# Patient Record
Sex: MALE | Race: WHITE | NOT HISPANIC OR LATINO
[De-identification: names, ages, dates, MRNs, and addresses within clinical notes are randomized per-mention and may not be internally consistent; named-entity substitution may affect disease eponyms.]

---

## 2024-08-10 ENCOUNTER — TRANSCRIPTION ENCOUNTER (OUTPATIENT)
Age: 49
End: 2024-08-10

## 2024-08-11 ENCOUNTER — INPATIENT (INPATIENT)
Facility: HOSPITAL | Age: 49
LOS: 15 days | Discharge: HOME | End: 2024-08-27
Attending: COLON & RECTAL SURGERY | Admitting: SURGERY
Payer: MEDICARE

## 2024-08-11 VITALS
SYSTOLIC BLOOD PRESSURE: 139 MMHG | HEART RATE: 84 BPM | WEIGHT: 171.96 LBS | DIASTOLIC BLOOD PRESSURE: 86 MMHG | HEIGHT: 65 IN | RESPIRATION RATE: 18 BRPM | OXYGEN SATURATION: 98 % | TEMPERATURE: 98 F

## 2024-08-11 DIAGNOSIS — Z90.49 ACQUIRED ABSENCE OF OTHER SPECIFIED PARTS OF DIGESTIVE TRACT: Chronic | ICD-10-CM

## 2024-08-11 LAB
ADD ON TEST-SPECIMEN IN LAB: SIGNIFICANT CHANGE UP
ALBUMIN SERPL ELPH-MCNC: 3.9 G/DL — SIGNIFICANT CHANGE UP (ref 3.3–5)
ALP SERPL-CCNC: 37 U/L — LOW (ref 40–120)
ALT FLD-CCNC: 11 U/L — SIGNIFICANT CHANGE UP (ref 10–45)
ANION GAP SERPL CALC-SCNC: 8 MMOL/L — SIGNIFICANT CHANGE UP (ref 5–17)
APPEARANCE UR: CLEAR — SIGNIFICANT CHANGE UP
APTT BLD: 27.9 SEC — SIGNIFICANT CHANGE UP (ref 24.5–35.6)
AST SERPL-CCNC: 16 U/L — SIGNIFICANT CHANGE UP (ref 10–40)
BASOPHILS # BLD AUTO: 0.04 K/UL — SIGNIFICANT CHANGE UP (ref 0–0.2)
BASOPHILS NFR BLD AUTO: 0.6 % — SIGNIFICANT CHANGE UP (ref 0–2)
BILIRUB SERPL-MCNC: 1.1 MG/DL — SIGNIFICANT CHANGE UP (ref 0.2–1.2)
BILIRUB UR-MCNC: NEGATIVE — SIGNIFICANT CHANGE UP
BLD GP AB SCN SERPL QL: NEGATIVE — SIGNIFICANT CHANGE UP
BUN SERPL-MCNC: 12 MG/DL — SIGNIFICANT CHANGE UP (ref 7–23)
CALCIUM SERPL-MCNC: 8.8 MG/DL — SIGNIFICANT CHANGE UP (ref 8.4–10.5)
CHLORIDE SERPL-SCNC: 105 MMOL/L — SIGNIFICANT CHANGE UP (ref 96–108)
CO2 SERPL-SCNC: 25 MMOL/L — SIGNIFICANT CHANGE UP (ref 22–31)
COLOR SPEC: YELLOW — SIGNIFICANT CHANGE UP
CREAT SERPL-MCNC: 0.73 MG/DL — SIGNIFICANT CHANGE UP (ref 0.5–1.3)
CRP SERPL-MCNC: 19.5 MG/L — HIGH (ref 0–4)
DIFF PNL FLD: NEGATIVE — SIGNIFICANT CHANGE UP
EGFR: 113 ML/MIN/1.73M2 — SIGNIFICANT CHANGE UP
EOSINOPHIL # BLD AUTO: 0.59 K/UL — HIGH (ref 0–0.5)
EOSINOPHIL NFR BLD AUTO: 8.6 % — HIGH (ref 0–6)
GLUCOSE SERPL-MCNC: 123 MG/DL — HIGH (ref 70–99)
GLUCOSE UR QL: NEGATIVE MG/DL — SIGNIFICANT CHANGE UP
HBV CORE AB SER-ACNC: SIGNIFICANT CHANGE UP
HBV SURFACE AB SER-ACNC: SIGNIFICANT CHANGE UP
HBV SURFACE AG SER-ACNC: SIGNIFICANT CHANGE UP
HCT VFR BLD CALC: 42.1 % — SIGNIFICANT CHANGE UP (ref 39–50)
HGB BLD-MCNC: 13.8 G/DL — SIGNIFICANT CHANGE UP (ref 13–17)
HIV 1+2 AB+HIV1 P24 AG SERPL QL IA: SIGNIFICANT CHANGE UP
IMM GRANULOCYTES NFR BLD AUTO: 0.1 % — SIGNIFICANT CHANGE UP (ref 0–0.9)
INR BLD: 0.98 — SIGNIFICANT CHANGE UP (ref 0.85–1.18)
KETONES UR-MCNC: NEGATIVE MG/DL — SIGNIFICANT CHANGE UP
LACTATE SERPL-SCNC: 0.8 MMOL/L — SIGNIFICANT CHANGE UP (ref 0.5–2)
LEUKOCYTE ESTERASE UR-ACNC: NEGATIVE — SIGNIFICANT CHANGE UP
LIDOCAIN IGE QN: 34 U/L — SIGNIFICANT CHANGE UP (ref 7–60)
LYMPHOCYTES # BLD AUTO: 1.27 K/UL — SIGNIFICANT CHANGE UP (ref 1–3.3)
LYMPHOCYTES # BLD AUTO: 18.6 % — SIGNIFICANT CHANGE UP (ref 13–44)
MCHC RBC-ENTMCNC: 27.1 PG — SIGNIFICANT CHANGE UP (ref 27–34)
MCHC RBC-ENTMCNC: 32.8 GM/DL — SIGNIFICANT CHANGE UP (ref 32–36)
MCV RBC AUTO: 82.5 FL — SIGNIFICANT CHANGE UP (ref 80–100)
MONOCYTES # BLD AUTO: 0.76 K/UL — SIGNIFICANT CHANGE UP (ref 0–0.9)
MONOCYTES NFR BLD AUTO: 11.1 % — SIGNIFICANT CHANGE UP (ref 2–14)
NEUTROPHILS # BLD AUTO: 4.16 K/UL — SIGNIFICANT CHANGE UP (ref 1.8–7.4)
NEUTROPHILS NFR BLD AUTO: 61 % — SIGNIFICANT CHANGE UP (ref 43–77)
NITRITE UR-MCNC: NEGATIVE — SIGNIFICANT CHANGE UP
NRBC # BLD: 0 /100 WBCS — SIGNIFICANT CHANGE UP (ref 0–0)
PH UR: 5.5 — SIGNIFICANT CHANGE UP (ref 5–8)
PLATELET # BLD AUTO: 288 K/UL — SIGNIFICANT CHANGE UP (ref 150–400)
POTASSIUM SERPL-MCNC: 4.1 MMOL/L — SIGNIFICANT CHANGE UP (ref 3.5–5.3)
POTASSIUM SERPL-SCNC: 4.1 MMOL/L — SIGNIFICANT CHANGE UP (ref 3.5–5.3)
PROT SERPL-MCNC: 7.1 G/DL — SIGNIFICANT CHANGE UP (ref 6–8.3)
PROT UR-MCNC: SIGNIFICANT CHANGE UP MG/DL
PROTHROM AB SERPL-ACNC: 11.2 SEC — SIGNIFICANT CHANGE UP (ref 9.5–13)
RBC # BLD: 5.1 M/UL — SIGNIFICANT CHANGE UP (ref 4.2–5.8)
RBC # FLD: 13.2 % — SIGNIFICANT CHANGE UP (ref 10.3–14.5)
RH IG SCN BLD-IMP: NEGATIVE — SIGNIFICANT CHANGE UP
RH IG SCN BLD-IMP: NEGATIVE — SIGNIFICANT CHANGE UP
SODIUM SERPL-SCNC: 138 MMOL/L — SIGNIFICANT CHANGE UP (ref 135–145)
SP GR SPEC: 1.03 — SIGNIFICANT CHANGE UP (ref 1–1.03)
UROBILINOGEN FLD QL: 1 MG/DL — SIGNIFICANT CHANGE UP (ref 0.2–1)
WBC # BLD: 6.83 K/UL — SIGNIFICANT CHANGE UP (ref 3.8–10.5)
WBC # FLD AUTO: 6.83 K/UL — SIGNIFICANT CHANGE UP (ref 3.8–10.5)

## 2024-08-11 PROCEDURE — 99285 EMERGENCY DEPT VISIT HI MDM: CPT

## 2024-08-11 PROCEDURE — 71045 X-RAY EXAM CHEST 1 VIEW: CPT | Mod: 26

## 2024-08-11 PROCEDURE — 74177 CT ABD & PELVIS W/CONTRAST: CPT | Mod: 26,MC

## 2024-08-11 PROCEDURE — 93010 ELECTROCARDIOGRAM REPORT: CPT

## 2024-08-11 DEVICE — STAPLER COVIDIEN TRI-STAPLE 45MM PURPLE RELOAD: Type: IMPLANTABLE DEVICE | Status: FUNCTIONAL

## 2024-08-11 DEVICE — STAPLER ETHICON PROXIMATE RELOAD 75MM BLUE: Type: IMPLANTABLE DEVICE | Status: FUNCTIONAL

## 2024-08-11 DEVICE — CLIP APPLIER ETHICON LIGACLIP 9 3/8" SMALL: Type: IMPLANTABLE DEVICE | Status: FUNCTIONAL

## 2024-08-11 DEVICE — STAPLER ETHICON PROXIMATE 75MM WITH BLUE RELOAD: Type: IMPLANTABLE DEVICE | Status: FUNCTIONAL

## 2024-08-11 DEVICE — STAPLER COVIDIEN TRI-STAPLE 60MM PURPLE RELOAD: Type: IMPLANTABLE DEVICE | Status: FUNCTIONAL

## 2024-08-11 DEVICE — CLIP APPLIER ETHICON LIGACLIP 11.5" MEDIUM: Type: IMPLANTABLE DEVICE | Status: FUNCTIONAL

## 2024-08-11 DEVICE — STAPLER COVIDIEN TA 60 BLUE: Type: IMPLANTABLE DEVICE | Status: FUNCTIONAL

## 2024-08-11 RX ORDER — HYDROMORPHONE HYDROCHLORIDE 2 MG/1
0.2 TABLET ORAL
Refills: 0 | Status: DISCONTINUED | OUTPATIENT
Start: 2024-08-11 | End: 2024-08-18

## 2024-08-11 RX ORDER — MENTHOL/CETYLPYRD CL 3 MG
1 LOZENGE MUCOUS MEMBRANE ONCE
Refills: 0 | Status: COMPLETED | OUTPATIENT
Start: 2024-08-11 | End: 2024-08-11

## 2024-08-11 RX ORDER — HEPARIN SODIUM,BOVINE 1000/ML
5000 VIAL (ML) INJECTION EVERY 8 HOURS
Refills: 0 | Status: DISCONTINUED | OUTPATIENT
Start: 2024-08-11 | End: 2024-08-21

## 2024-08-11 RX ORDER — ACETAMINOPHEN 325 MG/1
1000 TABLET ORAL ONCE
Refills: 0 | Status: COMPLETED | OUTPATIENT
Start: 2024-08-11 | End: 2024-08-11

## 2024-08-11 RX ORDER — ONDANSETRON 2 MG/ML
4 INJECTION, SOLUTION INTRAMUSCULAR; INTRAVENOUS EVERY 6 HOURS
Refills: 0 | Status: DISCONTINUED | OUTPATIENT
Start: 2024-08-11 | End: 2024-08-21

## 2024-08-11 RX ORDER — IOHEXOL 350 MG/ML
30 INJECTION, SOLUTION INTRAVENOUS ONCE
Refills: 0 | Status: COMPLETED | OUTPATIENT
Start: 2024-08-11 | End: 2024-08-11

## 2024-08-11 RX ORDER — SODIUM CHLORIDE 9 MG/ML
1000 INJECTION INTRAMUSCULAR; INTRAVENOUS; SUBCUTANEOUS ONCE
Refills: 0 | Status: COMPLETED | OUTPATIENT
Start: 2024-08-11 | End: 2024-08-11

## 2024-08-11 RX ADMIN — Medication 100 MILLILITER(S): at 14:30

## 2024-08-11 RX ADMIN — Medication 4 MILLIGRAM(S): at 01:23

## 2024-08-11 RX ADMIN — Medication 4 MILLIGRAM(S): at 03:20

## 2024-08-11 RX ADMIN — Medication 120 MILLILITER(S): at 05:08

## 2024-08-11 RX ADMIN — Medication 5000 UNIT(S): at 05:28

## 2024-08-11 RX ADMIN — ACETAMINOPHEN 400 MILLIGRAM(S): 325 TABLET ORAL at 17:55

## 2024-08-11 RX ADMIN — IOHEXOL 30 MILLILITER(S): 350 INJECTION, SOLUTION INTRAVENOUS at 01:23

## 2024-08-11 RX ADMIN — ACETAMINOPHEN 1000 MILLIGRAM(S): 325 TABLET ORAL at 05:58

## 2024-08-11 RX ADMIN — ACETAMINOPHEN 1000 MILLIGRAM(S): 325 TABLET ORAL at 18:55

## 2024-08-11 RX ADMIN — ACETAMINOPHEN 400 MILLIGRAM(S): 325 TABLET ORAL at 11:22

## 2024-08-11 RX ADMIN — Medication 4 MILLIGRAM(S): at 03:47

## 2024-08-11 RX ADMIN — SODIUM CHLORIDE 1000 MILLILITER(S): 9 INJECTION INTRAMUSCULAR; INTRAVENOUS; SUBCUTANEOUS at 03:20

## 2024-08-11 RX ADMIN — Medication 5000 UNIT(S): at 16:13

## 2024-08-11 RX ADMIN — Medication 120 MILLILITER(S): at 17:56

## 2024-08-11 RX ADMIN — Medication 1 SPRAY(S): at 14:29

## 2024-08-11 RX ADMIN — SODIUM CHLORIDE 1000 MILLILITER(S): 9 INJECTION INTRAMUSCULAR; INTRAVENOUS; SUBCUTANEOUS at 01:23

## 2024-08-11 RX ADMIN — ACETAMINOPHEN 400 MILLIGRAM(S): 325 TABLET ORAL at 05:28

## 2024-08-11 NOTE — ED PROVIDER NOTE - PROGRESS NOTE DETAILS
[place]: labs grossly wnl, UA no infeciton. CT pending, will reassess. [place]: CT showed "IMPRESSION: Status post small bowel resection with ileocolic anastomosis. Distended and thickened small bowel loops proximal to the anastomosis with transition zone identified at the level of the anastomosis and an additional transition point identified more proximally. Findings are   suspicious for a closed loop small bowel obstruction. Wall thickening of the distended small bowel loops is consistent with enteritis which could indicate early ischemia versus inflammatory bowel disease. Mild bladder wall thickening could indicate cystitis which may be reactive."  Pt remains well appearing. States pain is intermittent. Surgery consulted. Updated pt.

## 2024-08-11 NOTE — PROGRESS NOTE ADULT - SUBJECTIVE AND OBJECTIVE BOX
pt seen and examined.  see resident note for full details.  patient with acute presentation with abd pain x 1 day.  said he was completely fine last week.  CT concerning for closed loop vs crohns flare    PEx: patient uncomfortable  abd soft distended, no rigidity but sore when i palpate.    A/P Pt w/ hx crohns w/ closed loop vs crohns flare  -explained thought process to patient.  the purpose of procedure is hopefully diagnostic and possibly therapeutic.  hopefully will just cut a band of scar tissue but possibility does include crohns flare.  if just enteritis, will likely close and start medical therapy postop.  explained risks of surgery including bleeding, infection and injury to other organs and possible need for bowel resection if bowel is injured or unclear etiology.  he understands and wishes  to proceed.

## 2024-08-11 NOTE — ED PROVIDER NOTE - OBJECTIVE STATEMENT
49M PMH Crohns, PSHx bowel resection (~10yrs ago) p/w generalized abd pain since yesterday (friday), decreased appetite. No other systemic symptoms.   States he is not on any daily meds and hasn't had issues w/ Crohn's since his surgery ~10yrs ago.   Last BM ~1900, normal.   Denies fevers, chills, nausea, vomiting, diarrhea, black stool, bloody stool, dysuria, hematuria, urinary frequency, focal weakness, focal numbness, lightheadedness, back pain, SOB, CP, rhinorrhea, nasal congestion, sore throat, cough.

## 2024-08-11 NOTE — PATIENT PROFILE ADULT - FALL HARM RISK - UNIVERSAL INTERVENTIONS
Bed in lowest position, wheels locked, appropriate side rails in place/Call bell, personal items and telephone in reach/Instruct patient to call for assistance before getting out of bed or chair/Non-slip footwear when patient is out of bed/Serafina to call system/Physically safe environment - no spills, clutter or unnecessary equipment/Purposeful Proactive Rounding/Room/bathroom lighting operational, light cord in reach

## 2024-08-11 NOTE — H&P ADULT - NSHPPOAPULMEMBOLUS_GEN_A_CORE
Face to face report given with opportunity to observe patient.    Report given to Bianca Paulson   10/24/2017  7:15 PM     no

## 2024-08-11 NOTE — ED PROVIDER NOTE - CLINICAL SUMMARY MEDICAL DECISION MAKING FREE TEXT BOX
49M PMH Crohns, PSHx bowel resection (~10yrs ago) p/w generalized abd pain since yesterday, decreased appetite. No other systemic symptoms.   Vitals wnl, exam as above.   ddx: Possible SBO vs. colitis/Crohn's flare.  Labs, CT, IVF/attempt symptom control, reassess.   Pt requesting Dr. Jade if surgical issues (pt states that he was in touch w/ Lupillo Kong).

## 2024-08-11 NOTE — PROGRESS NOTE ADULT - ASSESSMENT
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 for abdominal pain. Patient afebrile, HD normal on presentation without acute laboratory abnormalaties noted. CTAP concerning for closed loop obstruction with two distinct transition points. Now s/p dx lap, lysis of adhesions (8/11)      NPO/IVF  s/p dx lap, no closed loop obstruction  NGT LIWS  TOV  HSQ/SCDs/IS/OOBA  Serial abdominal exams  GI Consult

## 2024-08-11 NOTE — ED PROVIDER NOTE - PHYSICAL EXAMINATION
abd: mild distention and tympanitic, soft, mild diffuse ttp, no rebound/guarding. No CVAT  no LE edema, normal equal distal pulses, steady unassisted gait.

## 2024-08-11 NOTE — H&P ADULT - HISTORY OF PRESENT ILLNESS
Patient is a 49M with PMHx of Crohn's disease (diagnosed in 2021, not currently on medications) and PSHx of SBR of terminal ileum (10 years ago) who presents to [facility] for abdominal pain. Patient reports the pain started yesterday afternoon and has been progressively worsening. Describes the pain as an intermittent, sharp pain, mostly in lower abdomen that is 5/10 at its worst. Denies nausea or emesis. States last BM was at 7 PM and last flatus was Friday PM. Denies fevers or chills at home. Also endorses poor PO intake over the past 24 hours secondary to poor appetite, but states he feels hungry now. States pain is nearly identical to prior Crohn's flares.    Medical History: Crohns  Surgical History: SBR  Medications: Denies  Allergies: Denies  Social History: Nonsmoker. No alcohol use  Scope: Last c-scope 2021 without acute findigs  No family history of CRC, IBD.    In the ED, patient afebrile, nontoxic appearing:   - VITALS: Afebrile T 97.5 F, HR 79, /77 saturating well on RA  - LABORATORY: No acute abnormalaties noted  - IMAGING: CTAP concerning for closed loop obstruction with 2 transition points - one at ileocolic anastamosis and one proximal with bowel wall thickeing (ischemia vs. active Crohns)

## 2024-08-11 NOTE — PROGRESS NOTE ADULT - SUBJECTIVE AND OBJECTIVE BOX
Procedure: dx lap, lysis of adhesions  Surgeon: Dr. Molina    S: Pt seen and examined at bedside. Patient is lying comfortably in bed with no complaints. Tolerating diet, pain well controlled with current regimen. Patient denies fever, nausea, vomiting, chest pain, and shortness of breath. Patient is not yet passing gas or having bowel movements. Patient has not voided yet.     O:  T(C): 36.6 (08-11-24 @ 12:35), Max: 36.6 (08-11-24 @ 10:11)  T(F): 97.8 (08-11-24 @ 12:35), Max: 97.8 (08-11-24 @ 10:11)  HR: 66 (08-11-24 @ 12:35) (66 - 79)  BP: 116/73 (08-11-24 @ 12:35) (116/73 - 147/88)  RR: 17 (08-11-24 @ 12:35) (11 - 20)  SpO2: 96% (08-11-24 @ 12:35) (94% - 98%)  Wt(kg): --                        13.8   6.83  )-----------( 288      ( 11 Aug 2024 01:02 )             42.1     08-11    138  |  105  |  12  ----------------------------<  123<H>  4.1   |  25  |  0.73    Ca    8.8      11 Aug 2024 01:02    TPro  7.1  /  Alb  3.9  /  TBili  1.1  /  DBili  x   /  AST  16  /  ALT  11  /  AlkPhos  37<L>  08-11      Physical Exam  General: Patient is doing well and lying in bed comfortably  Constitutional: alert and oriented   Pulm: Nonlabored breathing, no respiratory distress  CV: Regular rate and rhythm, normal sinus rhythm  Abd:  soft, nontender, nondistended. No rebound, no guarding.             Incisions: clean, dry, intact, no erythema/induration/edema  Extremities: warm, well perfused, no edema

## 2024-08-11 NOTE — H&P ADULT - NSHPPHYSICALEXAM_GEN_ALL_CORE
General: Resting in bed, NAD  Neuro: A&Ox3, no focal deficits  CV: NSR  Pulm: Equal chest wall expansion b/l, no respiratory distress  Abdomen: Soft, mild to moderate distention with tenderness and voluntary guarding in RLQ  Extremities: WWP. No edema

## 2024-08-11 NOTE — H&P ADULT - ASSESSMENT
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 for abdominal pain. Patient afebrile, HD normal on presentation without acute laboratory abnormalaties noted. CTAP concerning for closed loop obstruction with two distinct transition points. Etiologies include adhesive vs. inflammatory, but cannot rule out ischemia given findings. Will admit for diagnostic laparoscopy.    Admit to Dr. Jesse valiente  NPO/IVF  HSQ/SCDs/IS/OOBA  Serial abdominal exams  Pre-op labs  Add on to OR, class II

## 2024-08-11 NOTE — CHART NOTE - NSCHARTNOTEFT_GEN_A_CORE
49M h/o Crohns (dx 2021, not on meds), TI resection (~10 yrs ago) p/w abd pain x1d.     Sx reportedly similar to prior Crohns flares. CT read as concerning for closed-loop obstruction near IC anastomosis and was taken for dx lap which noted no closed-loop obstruction but notable for SBO with a stricture at the anastomosis. Per surgical team unable to tell if inflammatory vs fibrostenotic based on external appearance. Initial CRP 19.5. Surgical team inquiring regarding utility of starting steroids in case there is an inflammatory component of the SBO.    Imaging reviewed and while there is small bowel wall thickening proximal to the transition point there does not appear to be a significant amount of fat stranding around the area. This is somewhat discordant with the elevated CRP. Regarding an empiric steroid trial, he does not have comorbidities that would place him at particular risks during a course of steroids. Would note that sometimes pts with fibrostenotic strictures can become un-obstructed with time too so improvement on steroids does not necessarily mean this was predominantly due to inflammation.    Recommendations:  - Send Quantiferon, HBsAg, HBsAb, total HBcAb, HIV  - If pt has BMs send fecal calprotectin, and if pt develops diarrhea would also check C. diff and GI PCR  - Trend CRP daily  - Would be reasonable to observe for improvement in next 12-24 hrs vs start IV methylpred 20mg TID now (but please obtain Quantiferon first)  - Please obtain last colonoscopy report  - After obstruction resolves could consider colonoscopy for further evaluation    We will continue to follow. Case will be formally staffed tomorrow.    González Martinez MD ([place])  Gastroenterology Fellow  GI consult pager (M-F 5m-6d): [phone]  Please call  for on-call fellow after hours

## 2024-08-11 NOTE — H&P ADULT - NSHPLABSRESULTS_GEN_ALL_CORE
INTERPRETATION:  CLINICAL INFORMATION: abd distention, ttp, hx crohns w/   resection   10yrs ago    COMPARISON: None.    CONTRAST/COMPLICATIONS:  IV Contrast: Isovue 370  90 cc administered   10 cc discarded  Oral Contrast: Omnipaque 300  Complications: None reported at time of study completion    PROCEDURE:  CT of the Abdomen and Pelvis was performed.  Sagittal and coronalreformats were performed.    FINDINGS:  LOWER CHEST: Within normal limits.    LIVER: Hepatomegaly.Few hypodensities are too small to characterize.  BILE DUCTS: Normal caliber.  GALLBLADDER: Within normal limits.  SPLEEN: Few hypodensities are too small to characterize.  PANCREAS: Within normal limits.  ADRENALS: Within normal limits.  KIDNEYS/URETERS: Mild fullness of both renal collecting systems without   evidence of obstructing stone.    BLADDER: Mild wall thickening.  REPRODUCTIVE ORGANS: Prostate within normal limits.    BOWEL: There are multiple distended small bowel loops throughout the   abdomen. Enteric contrast reaches small bowel in the left lower abdomen.   Ileocolic anastomosis is noted. Distal ileum at the level of anastomosis   appears thickened with a transition zone identified at the level of the   anastomosis. Small bowel loops proximal to the anastomosis are thickened.   A second transition point is identified proximal to a distended small   bowel loop centrally in the abdomen. Additional contrast filled and   distended small bowel loops are noted in the left abdomen. No   pneumatosis. Appendix is absent.  PERITONEUM/RETROPERITONEUM: Small abdominopelvic ascites. Mild mesenteric   edema. No free air.  VESSELS: Within normal limits.  LYMPH NODES: Several mildly prominent mesenteric lymph nodes are noted,   probably reactive.  ABDOMINAL WALL: Within normal limits.  BONES: Within normal limits.    IMPRESSION:  Status post small bowel resection with ileocolic anastomosis. Distended   and thickened small bowel loops proximal to the anastomosis with   transition zone identified at the level of the anastomosis and an   additional transition point identified more proximally. Findings are   suspicious for a closed loop small bowel obstruction. Wall thickening of   the distended small bowel loops is consistent with enteritis which could   indicate early ischemia versus inflammatory bowel disease.    Mild bladder wall thickening could indicate cystitis which may be  reactive.

## 2024-08-12 LAB
ANION GAP SERPL CALC-SCNC: 8 MMOL/L — SIGNIFICANT CHANGE UP (ref 5–17)
BUN SERPL-MCNC: 11 MG/DL — SIGNIFICANT CHANGE UP (ref 7–23)
CALCIUM SERPL-MCNC: 8.8 MG/DL — SIGNIFICANT CHANGE UP (ref 8.4–10.5)
CHLORIDE SERPL-SCNC: 106 MMOL/L — SIGNIFICANT CHANGE UP (ref 96–108)
CO2 SERPL-SCNC: 25 MMOL/L — SIGNIFICANT CHANGE UP (ref 22–31)
CREAT SERPL-MCNC: 0.69 MG/DL — SIGNIFICANT CHANGE UP (ref 0.5–1.3)
CRP SERPL-MCNC: 32.6 MG/L — HIGH (ref 0–4)
CULTURE RESULTS: SIGNIFICANT CHANGE UP
EGFR: 115 ML/MIN/1.73M2 — SIGNIFICANT CHANGE UP
GLUCOSE SERPL-MCNC: 102 MG/DL — HIGH (ref 70–99)
HCT VFR BLD CALC: 41.7 % — SIGNIFICANT CHANGE UP (ref 39–50)
HGB BLD-MCNC: 13.7 G/DL — SIGNIFICANT CHANGE UP (ref 13–17)
MAGNESIUM SERPL-MCNC: 1.9 MG/DL — SIGNIFICANT CHANGE UP (ref 1.6–2.6)
MCHC RBC-ENTMCNC: 28.2 PG — SIGNIFICANT CHANGE UP (ref 27–34)
MCHC RBC-ENTMCNC: 32.9 GM/DL — SIGNIFICANT CHANGE UP (ref 32–36)
MCV RBC AUTO: 86 FL — SIGNIFICANT CHANGE UP (ref 80–100)
NRBC # BLD: 0 /100 WBCS — SIGNIFICANT CHANGE UP (ref 0–0)
PHOSPHATE SERPL-MCNC: 2.6 MG/DL — SIGNIFICANT CHANGE UP (ref 2.5–4.5)
PLATELET # BLD AUTO: 266 K/UL — SIGNIFICANT CHANGE UP (ref 150–400)
POTASSIUM SERPL-MCNC: 4.1 MMOL/L — SIGNIFICANT CHANGE UP (ref 3.5–5.3)
POTASSIUM SERPL-SCNC: 4.1 MMOL/L — SIGNIFICANT CHANGE UP (ref 3.5–5.3)
RBC # BLD: 4.85 M/UL — SIGNIFICANT CHANGE UP (ref 4.2–5.8)
RBC # FLD: 13.7 % — SIGNIFICANT CHANGE UP (ref 10.3–14.5)
SODIUM SERPL-SCNC: 139 MMOL/L — SIGNIFICANT CHANGE UP (ref 135–145)
SPECIMEN SOURCE: SIGNIFICANT CHANGE UP
WBC # BLD: 5.47 K/UL — SIGNIFICANT CHANGE UP (ref 3.8–10.5)
WBC # FLD AUTO: 5.47 K/UL — SIGNIFICANT CHANGE UP (ref 3.8–10.5)

## 2024-08-12 PROCEDURE — 71045 X-RAY EXAM CHEST 1 VIEW: CPT | Mod: 26

## 2024-08-12 PROCEDURE — 99221 1ST HOSP IP/OBS SF/LOW 40: CPT

## 2024-08-12 RX ORDER — METHYLPREDNISOLONE 4 MG
20 TABLET ORAL EVERY 8 HOURS
Refills: 0 | Status: DISCONTINUED | OUTPATIENT
Start: 2024-08-12 | End: 2024-08-20

## 2024-08-12 RX ADMIN — Medication 5000 UNIT(S): at 15:44

## 2024-08-12 RX ADMIN — Medication 5000 UNIT(S): at 00:49

## 2024-08-12 RX ADMIN — Medication 20 MILLIGRAM(S): at 15:44

## 2024-08-12 RX ADMIN — Medication 1 PATCH: at 09:11

## 2024-08-12 RX ADMIN — Medication 100 GRAM(S): at 07:33

## 2024-08-12 RX ADMIN — Medication 5000 UNIT(S): at 07:09

## 2024-08-12 RX ADMIN — Medication 1 PATCH: at 18:56

## 2024-08-12 RX ADMIN — Medication 20 MILLIGRAM(S): at 23:40

## 2024-08-12 RX ADMIN — ONDANSETRON 4 MILLIGRAM(S): 2 INJECTION, SOLUTION INTRAMUSCULAR; INTRAVENOUS at 06:28

## 2024-08-12 RX ADMIN — Medication 1 SPRAY(S): at 00:49

## 2024-08-12 RX ADMIN — Medication 5000 UNIT(S): at 23:40

## 2024-08-12 NOTE — CONSULT NOTE ADULT - SUBJECTIVE AND OBJECTIVE BOX
CRS Attending    Chart reviewed:  Patient is a 49M with PMHx of Crohn's disease (diagnosed in 2021, not currently on medications) and PSHx of SBR of terminal ileum (10 years ago) who presents to [facility] ED over weekend for abdominal pain. Patient reported the pain started the afternoon prior and had been progressively worsening. Described the pain as an intermittent, sharp pain, mostly in lower abdomen that was 5/10 at its worst. Denied nausea or emesis. Stated last BM was at 7 PM day of presentation  Denied fevers or chills at home. Also endorsed poor PO intake over the prior 24 hours secondary to poor appetite. Stated pain is nearly identical to prior Crohn's flares.      ACC: [id number] EXAM: CT ABDOMEN AND PELVIS OC IC ORDERED BY: ZEFERINO CHRISTOPHER    PROCEDURE DATE: 08/11/2024        INTERPRETATION: CLINICAL INFORMATION: abd distention, ttp, hx crohns w/ resection  10yrs ago    COMPARISON: None.    CONTRAST/COMPLICATIONS:  IV Contrast: Isovue 370 90 cc administered 10 cc discarded  Oral Contrast: Omnipaque 300  Complications: None reported at time of study completion    PROCEDURE:  CT of the Abdomen and Pelvis was performed.  Sagittal and coronal reformats were performed.    FINDINGS:  LOWER CHEST: Within normal limits.    LIVER: Hepatomegaly.Few hypodensities are too small to characterize.  BILE DUCTS: Normal caliber.  GALLBLADDER: Within normal limits.  SPLEEN: Few hypodensities are too small to characterize.  PANCREAS: Within normal limits.  ADRENALS: Within normal limits.  KIDNEYS/URETERS: Mild fullness of both renal collecting systems without evidence of obstructing stone.    BLADDER: Mild wall thickening.  REPRODUCTIVE ORGANS: Prostate within normal limits.    BOWEL: There are multiple distended small bowel loops throughout the abdomen. Enteric contrast reaches small bowel in the left lower abdomen. Ileocolic anastomosis is noted. Distal ileum at the level of anastomosis appears thickened with a transition zone identified at the level of the anastomosis. Small bowel loops proximal to the anastomosis are thickened. A second transition point is identified proximal to a distended small bowel loop centrally in the abdomen. Additional contrast filled and distended small bowel loops are noted in the left abdomen. No pneumatosis. Appendix is absent.  PERITONEUM/RETROPERITONEUM: Small abdominopelvic ascites. Mild mesenteric edema. No free air.  VESSELS: Within normal limits.  LYMPH NODES: Several mildly prominent mesenteric lymph nodes are noted, probably reactive.  ABDOMINAL WALL: Within normal limits.  BONES: Within normal limits.    IMPRESSION:  Status post small bowel resection with ileocolic anastomosis. Distended and thickened small bowel loops proximal to the anastomosis with transition zone identified at the level of the anastomosis and an additional transition point identified more proximally. Findings are suspicious for a closed loop small bowel obstruction. Wall thickening of the distended small bowel loops is consistent with enteritis which could indicate early ischemia versus inflammatory bowel disease.    Mild bladder wall thickening could indicate cystitis which may be reactive.        --- End of Report ---            MALOU ARREOLA MD; Attending Radiologist  This document has been electronically signed. Aug 11 2024 3:15AM        Patient was taken to OR by Dr Molina Acute Care Surgery 8/11/24.  Per discussion with Dr Molina, no evidence of closed loop obstruction intraoperatively; clinical concern Crohn's disease at prior ileocolic anastomosis. Decision was made to close abdomen and GI was consulted postoperatively.    Acute Care Surgery Dr Cevallos requested Colorectal Surgery team assume care of patient given concern for Crohn's disease.    Patient seen on rounds  States he feels improved since OR. Has passed flatus and had a BM since surgery. States pain that brought him to hospital has improved.  States his care team is at [facility] - Surgeon was Dr Dumont; GI is also at [facility], however patient reports he has not been on medication for Crohn's disease for several years/since prior surgery.    Vital Signs Last 24 Hrs  T(C): 36.7 (12 Aug 2024 12:25), Max: 37.1 (11 Aug 2024 16:26)  T(F): 98.1 (12 Aug 2024 12:25), Max: 98.7 (11 Aug 2024 16:26)  HR: 82 (12 Aug 2024 12:25) (66 - 82)  BP: 112/74 (12 Aug 2024 12:25) (112/74 - 157/84)  BP(mean): --  RR: 17 (12 Aug 2024 12:25) (16 - 17)  SpO2: 94% (12 Aug 2024 12:25) (93% - 97%)    Parameters below as of 12 Aug 2024 12:25  Patient On (Oxygen Delivery Method): room air    I&O's Detail    11 Aug 2024 07:01  -  12 Aug 2024 07:00  --------------------------------------------------------  IN:    IV PiggyBack: 100 mL    Lactated Ringers: 600 mL    Lactated Ringers: 1650 mL    Lactated Ringers Bolus: 600 mL  Total IN: 2950 mL    OUT:    Nasogastric/Oral tube (mL): 900 mL    Voided (mL): 1750 mL  Total OUT: 2650 mL    Total NET: 300 mL      12 Aug 2024 07:01  -  12 Aug 2024 12:29  --------------------------------------------------------  IN:    IV PiggyBack: 100 mL    Lactated Ringers: 360 mL  Total IN: 460 mL    OUT:    Nasogastric/Oral tube (mL): 200 mL  Total OUT: 200 mL    Total NET: 260 mL        Gen NAD  NGT with clear output  Abdomen softly distended, nontender, incisions clean and intact with Dermabond, no cellulitis                          13.7   5.47  )-----------( 266      ( 12 Aug 2024 05:30 )             41.7   08-12    139  |  106  |  11  ----------------------------<  102<H>  4.1   |  25  |  0.69    Ca    8.8      12 Aug 2024 05:30  Phos  2.6     08-12  Mg     1.9     08-12    TPro  7.1  /  Alb  3.9  /  TBili  1.1  /  DBili  x   /  AST  16  /  ALT  11  /  AlkPhos  37<L>  08-11

## 2024-08-12 NOTE — PROGRESS NOTE ADULT - ASSESSMENT
49M with PMHx of Crohn's disease (diagnosed in 2021, not currently on medications) and PSHx of SBR of terminal ileum (10 years ago) who presents to [facility] for abdominal pain. Patient afebrile, HD normal on presentation without acute laboratory abnormalaties noted. CTAP concerning for closed loop obstruction with two distinct transition points. Now s/p dx lap, lysis of adhesions (8/11)      NPO/IVF  s/p dx lap, no closed loop obstruction  NGT LIWS  HSQ/SCDs/IS/OOBA  GI Consult

## 2024-08-12 NOTE — PROGRESS NOTE ADULT - SUBJECTIVE AND OBJECTIVE BOX
INTERVAL HPI/OVERNIGHT EVENTS: NAEON    STATUS POST: dx lap, lysis of adhesions (8/11)    POST OPERATIVE DAY #: 1    SUBJECTIVE: Pt seen and examined at bedside this am. +F/+BMx2. Pain well controlled. Denies f/n/v/cp/sob.    MEDICATIONS  (STANDING):  heparin   Injectable 5000 Unit(s) SubCutaneous every 8 hours  HYDROmorphone  Injectable 0.2 milliGRAM(s) IV Push every 15 minutes  lactated ringers. 1000 milliLiter(s) (120 mL/Hr) IV Continuous <Continuous>  magnesium sulfate  IVPB 1 Gram(s) IV Intermittent once    MEDICATIONS  (PRN):  ondansetron Injectable 4 milliGRAM(s) IV Push every 6 hours PRN Nausea      Vital Signs Last 24 Hrs  T(C): 36.8 (12 Aug 2024 05:12), Max: 37.1 (11 Aug 2024 16:26)  T(F): 98.3 (12 Aug 2024 05:12), Max: 98.7 (11 Aug 2024 16:26)  HR: 73 (12 Aug 2024 05:12) (66 - 81)  BP: 157/84 (12 Aug 2024 05:12) (116/73 - 157/84)  BP(mean): 97 (11 Aug 2024 11:27) (94 - 112)  RR: 17 (12 Aug 2024 05:12) (11 - 20)  SpO2: 96% (12 Aug 2024 05:12) (94% - 98%)    Parameters below as of 12 Aug 2024 05:12  Patient On (Oxygen Delivery Method): room air        PHYSICAL EXAM:      Constitutional: A&Ox3, nad  Respiratory: non labored breathing, no respiratory distress  Cardiovascular: NSR, RRR  Gastrointestinal: abd soft, appropriately TTP, mildly DT                 Incision: C/D/I  Extremities: (-) edema, wwp                  I&O's Detail    11 Aug 2024 07:01  -  12 Aug 2024 07:00  --------------------------------------------------------  IN:    IV PiggyBack: 100 mL    Lactated Ringers: 600 mL    Lactated Ringers: 1650 mL    Lactated Ringers Bolus: 600 mL  Total IN: 2950 mL    OUT:    Nasogastric/Oral tube (mL): 900 mL    Voided (mL): 1400 mL  Total OUT: 2300 mL    Total NET: 650 mL          LABS:                        13.7   5.47  )-----------( 266      ( 12 Aug 2024 05:30 )             41.7     08-12    139  |  106  |  11  ----------------------------<  102<H>  4.1   |  25  |  0.69    Ca    8.8      12 Aug 2024 05:30  Phos  2.6     08-12  Mg     1.9     08-12    TPro  7.1  /  Alb  3.9  /  TBili  1.1  /  DBili  x   /  AST  16  /  ALT  11  /  AlkPhos  37<L>  08-11    PT/INR - ( 11 Aug 2024 03:25 )   PT: 11.2 sec;   INR: 0.98          PTT - ( 11 Aug 2024 03:25 )  PTT:27.9 sec  Urinalysis Basic - ( 12 Aug 2024 05:30 )    Color: x / Appearance: x / SG: x / pH: x  Gluc: 102 mg/dL / Ketone: x  / Bili: x / Urobili: x   Blood: x / Protein: x / Nitrite: x   Leuk Esterase: x / RBC: x / WBC x   Sq Epi: x / Non Sq Epi: x / Bacteria: x        RADIOLOGY & ADDITIONAL STUDIES:

## 2024-08-12 NOTE — CONSULT NOTE ADULT - SUBJECTIVE AND OBJECTIVE BOX
HOSPITAL COURSE: Patient is a 49M with PMHx of Crohn's disease (diagnosed in 2021, not currently on medications) and PSHx of SBR of terminal ileum (10 years ago) who presents to [facility] for abdominal pain. Patient reports the pain started on 8/9 and was progressively worsening. Describes the pain as an intermittent, sharp pain, mostly in lower abdomen that is 5/10 at its worst. Denied nausea or emesis. States last BM on admission was at 7 PM on 8/11 and last flatus was Friday PM. Denies fevers or chills at home. States pain is nearly identical to prior Crohn's flares.    Patient afebrile, HD normal on presentation without acute laboratory abnormalaties noted. CTAP concerning for closed loop obstruction with two distinct transition points. Now s/p dx lap, lysis of adhesions (8/11)    OVERNIGHT EVENTS: Deonna    SUBJECTIVE: Post OP day 1. Pt resting in bed, reports his abdominal pain feels improved. Pt reports he had 2 bowel movements and has been passing gas. Denies any other symptoms currently. Pt has a history of Crohn's, not on any medications as his symptoms improved following SBR of terminal ileum. He reports he was supposed to be on humira, but never had treatment. He is followed by Dr. Rodriguez at [facility], reports his last C-scope was "a few years ago" and normal. Denies family hx of CRC. Denies tobacco or drug use. Reports his only surgery was SBR of terminal ileum.     ROS: otherwise negative      T(C): 36.9 (08-12-24 @ 08:05), Max: 37.1 (08-11-24 @ 16:26)  HR: 82 (08-12-24 @ 08:05) (66 - 82)  BP: 114/74 (08-12-24 @ 08:05) (114/74 - 157/84)  RR: 16 (08-12-24 @ 08:05) (11 - 20)  SpO2: 93% (08-12-24 @ 08:05) (93% - 98%)  Wt(kg): --Vital Signs Last 24 Hrs  T(C): 36.9 (12 Aug 2024 08:05), Max: 37.1 (11 Aug 2024 16:26)  T(F): 98.5 (12 Aug 2024 08:05), Max: 98.7 (11 Aug 2024 16:26)  HR: 82 (12 Aug 2024 08:05) (66 - 82)  BP: 114/74 (12 Aug 2024 08:05) (114/74 - 157/84)  BP(mean): 97 (11 Aug 2024 11:27) (94 - 112)  RR: 16 (12 Aug 2024 08:05) (11 - 20)  SpO2: 93% (12 Aug 2024 08:05) (93% - 98%)    Parameters below as of 12 Aug 2024 08:05  Patient On (Oxygen Delivery Method): room air        PHYSICAL EXAM:  Constitutional: resting comfortably in bed; NAD  Eyes: EOMI  Respiratory: CTA B/L; no W/R/R  Cardiac: RRR; no M/R/G  Gastrointestinal: soft, mild distention. Mild post op tenderness. Incisions appear clean/dry, no signs of bleeding. NG tube in place  Extremities: WWP  Musculoskeletal: NROM x4  Vascular: 2+ posterior tibial pulses  Neurologic: AAOx3; no focal deficits  Psychiatric: affect and characteristics of appearance, verbalizations, behaviors are appropriate    LABS:                        13.7   5.47  )-----------( 266      ( 12 Aug 2024 05:30 )             41.7     08-12    139  |  106  |  11  ----------------------------<  102<H>  4.1   |  25  |  0.69    Ca    8.8      12 Aug 2024 05:30  Phos  2.6     08-12  Mg     1.9     08-12    TPro  7.1  /  Alb  3.9  /  TBili  1.1  /  DBili  x   /  AST  16  /  ALT  11  /  AlkPhos  37<L>  08-11      PT/INR - ( 11 Aug 2024 03:25 )   PT: 11.2 sec;   INR: 0.98          PTT - ( 11 Aug 2024 03:25 )  PTT:27.9 sec  Urinalysis Basic - ( 12 Aug 2024 05:30 )    Color: x / Appearance: x / SG: x / pH: x  Gluc: 102 mg/dL / Ketone: x  / Bili: x / Urobili: x   Blood: x / Protein: x / Nitrite: x   Leuk Esterase: x / RBC: x / WBC x   Sq Epi: x / Non Sq Epi: x / Bacteria: x      CAPILLARY BLOOD GLUCOSE            Urinalysis Basic - ( 12 Aug 2024 05:30 )    Color: x / Appearance: x / SG: x / pH: x  Gluc: 102 mg/dL / Ketone: x  / Bili: x / Urobili: x   Blood: x / Protein: x / Nitrite: x   Leuk Esterase: x / RBC: x / WBC x   Sq Epi: x / Non Sq Epi: x / Bacteria: x        MEDICATIONS  (STANDING):  heparin   Injectable 5000 Unit(s) SubCutaneous every 8 hours  HYDROmorphone  Injectable 0.2 milliGRAM(s) IV Push every 15 minutes  lactated ringers. 1000 milliLiter(s) (120 mL/Hr) IV Continuous <Continuous>    MEDICATIONS  (PRN):  ondansetron Injectable 4 milliGRAM(s) IV Push every 6 hours PRN Nausea      RADIOLOGY & ADDITIONAL TESTS: Reviewed   *****INCOMPLETE NOTE*******    HOSPITAL COURSE: Patient is a 49M with PMHx of Crohn's disease (diagnosed in 2021, not currently on medications) and PSHx of SBR of terminal ileum (10 years ago) who presents to [facility] for abdominal pain. Patient reports the pain started on 8/9 and was progressively worsening. Describes the pain as an intermittent, sharp pain, mostly in lower abdomen that is 5/10 at its worst. Denied nausea or emesis. States last BM on admission was at 7 PM on 8/11 and last flatus was Friday PM. Denies fevers or chills at home. States pain is nearly identical to prior Crohn's flares.    Patient afebrile, HD normal on presentation without acute laboratory abnormalaties noted. CTAP concerning for closed loop obstruction with two distinct transition points. Now s/p dx lap, lysis of adhesions (8/11)    OVERNIGHT EVENTS: Deonna    SUBJECTIVE: Post OP day 1. Pt resting in bed, reports his abdominal pain feels improved. Pt reports he had 2 bowel movements and has been passing gas. Denies any other symptoms currently. Pt has a history of Crohn's, not on any medications as his symptoms improved following SBR of terminal ileum. He reports he was supposed to be on humira, but never had treatment. He is followed by Dr. Rodriguez at [facility], reports his last C-scope was "a few years ago" and normal. Denies family hx of CRC. Denies tobacco or drug use. Reports his only surgery was SBR of terminal ileum.     ROS: otherwise negative      T(C): 36.9 (08-12-24 @ 08:05), Max: 37.1 (08-11-24 @ 16:26)  HR: 82 (08-12-24 @ 08:05) (66 - 82)  BP: 114/74 (08-12-24 @ 08:05) (114/74 - 157/84)  RR: 16 (08-12-24 @ 08:05) (11 - 20)  SpO2: 93% (08-12-24 @ 08:05) (93% - 98%)  Wt(kg): --Vital Signs Last 24 Hrs  T(C): 36.9 (12 Aug 2024 08:05), Max: 37.1 (11 Aug 2024 16:26)  T(F): 98.5 (12 Aug 2024 08:05), Max: 98.7 (11 Aug 2024 16:26)  HR: 82 (12 Aug 2024 08:05) (66 - 82)  BP: 114/74 (12 Aug 2024 08:05) (114/74 - 157/84)  BP(mean): 97 (11 Aug 2024 11:27) (94 - 112)  RR: 16 (12 Aug 2024 08:05) (11 - 20)  SpO2: 93% (12 Aug 2024 08:05) (93% - 98%)    Parameters below as of 12 Aug 2024 08:05  Patient On (Oxygen Delivery Method): room air        PHYSICAL EXAM:  Constitutional: resting comfortably in bed; NAD  Eyes: EOMI  Respiratory: CTA B/L; no W/R/R  Cardiac: RRR; no M/R/G  Gastrointestinal: soft, mild distention. Mild post op tenderness. Incisions appear clean/dry, no signs of bleeding. NG tube in place  Extremities: WWP  Musculoskeletal: NROM x4  Vascular: 2+ posterior tibial pulses  Neurologic: AAOx3; no focal deficits  Psychiatric: affect and characteristics of appearance, verbalizations, behaviors are appropriate    LABS:                        13.7   5.47  )-----------( 266      ( 12 Aug 2024 05:30 )             41.7     08-12    139  |  106  |  11  ----------------------------<  102<H>  4.1   |  25  |  0.69    Ca    8.8      12 Aug 2024 05:30  Phos  2.6     08-12  Mg     1.9     08-12    TPro  7.1  /  Alb  3.9  /  TBili  1.1  /  DBili  x   /  AST  16  /  ALT  11  /  AlkPhos  37<L>  08-11      PT/INR - ( 11 Aug 2024 03:25 )   PT: 11.2 sec;   INR: 0.98          PTT - ( 11 Aug 2024 03:25 )  PTT:27.9 sec  Urinalysis Basic - ( 12 Aug 2024 05:30 )    Color: x / Appearance: x / SG: x / pH: x  Gluc: 102 mg/dL / Ketone: x  / Bili: x / Urobili: x   Blood: x / Protein: x / Nitrite: x   Leuk Esterase: x / RBC: x / WBC x   Sq Epi: x / Non Sq Epi: x / Bacteria: x      CAPILLARY BLOOD GLUCOSE            Urinalysis Basic - ( 12 Aug 2024 05:30 )    Color: x / Appearance: x / SG: x / pH: x  Gluc: 102 mg/dL / Ketone: x  / Bili: x / Urobili: x   Blood: x / Protein: x / Nitrite: x   Leuk Esterase: x / RBC: x / WBC x   Sq Epi: x / Non Sq Epi: x / Bacteria: x        MEDICATIONS  (STANDING):  heparin   Injectable 5000 Unit(s) SubCutaneous every 8 hours  HYDROmorphone  Injectable 0.2 milliGRAM(s) IV Push every 15 minutes  lactated ringers. 1000 milliLiter(s) (120 mL/Hr) IV Continuous <Continuous>    MEDICATIONS  (PRN):  ondansetron Injectable 4 milliGRAM(s) IV Push every 6 hours PRN Nausea      RADIOLOGY & ADDITIONAL TESTS: Reviewed   *****INCOMPLETE NOTE*******    HOSPITAL COURSE: Patient is a 49M with PMHx of Crohn's disease (not currently on medications) and PSHx of SBR of terminal ileum (10 years ago) who presents to [facility] for abdominal pain. Patient reports the pain started on 8/9 and was progressively worsening. Describes the pain as an intermittent, sharp pain, mostly in lower abdomen that is 5/10 at its worst. Denied nausea or emesis. States last BM on admission was at 7 PM on 8/11 and last flatus was Friday PM. Denies fevers or chills at home. States pain is nearly identical to prior Crohn's flares.    Patient afebrile, HD normal on presentation without acute laboratory abnormalaties noted. CTAP concerning for closed loop obstruction with two distinct transition points. Now s/p dx lap, lysis of adhesions (8/11)    OVERNIGHT EVENTS: Deonna    SUBJECTIVE: Post OP day 1. Pt resting in bed, reports his abdominal pain feels improved. Pt reports he had 2 bowel movements and has been passing gas. Denies any other symptoms currently. Pt has a history of Crohn's, not on any medications as his symptoms improved following SBR of terminal ileum. He reports he was supposed to be on humira, but never had treatment. He is followed by Dr. Rodriguez at [facility], reports his last C-scope was "a few years ago" and normal. Denies family hx of CRC. Denies tobacco or drug use. Reports his only surgery was SBR of terminal ileum.     ROS: otherwise negative      T(C): 36.9 (08-12-24 @ 08:05), Max: 37.1 (08-11-24 @ 16:26)  HR: 82 (08-12-24 @ 08:05) (66 - 82)  BP: 114/74 (08-12-24 @ 08:05) (114/74 - 157/84)  RR: 16 (08-12-24 @ 08:05) (11 - 20)  SpO2: 93% (08-12-24 @ 08:05) (93% - 98%)  Wt(kg): --Vital Signs Last 24 Hrs  T(C): 36.9 (12 Aug 2024 08:05), Max: 37.1 (11 Aug 2024 16:26)  T(F): 98.5 (12 Aug 2024 08:05), Max: 98.7 (11 Aug 2024 16:26)  HR: 82 (12 Aug 2024 08:05) (66 - 82)  BP: 114/74 (12 Aug 2024 08:05) (114/74 - 157/84)  BP(mean): 97 (11 Aug 2024 11:27) (94 - 112)  RR: 16 (12 Aug 2024 08:05) (11 - 20)  SpO2: 93% (12 Aug 2024 08:05) (93% - 98%)    Parameters below as of 12 Aug 2024 08:05  Patient On (Oxygen Delivery Method): room air        PHYSICAL EXAM:  Constitutional: resting comfortably in bed; NAD  Eyes: EOMI  Respiratory: CTA B/L; no W/R/R  Cardiac: RRR; no M/R/G  Gastrointestinal: soft, mild distention. Mild post op tenderness. Incisions appear clean/dry, no signs of bleeding. NG tube in place  Extremities: WWP  Musculoskeletal: NROM x4  Vascular: 2+ posterior tibial pulses  Neurologic: AAOx3; no focal deficits  Psychiatric: affect and characteristics of appearance, verbalizations, behaviors are appropriate    LABS:                        13.7   5.47  )-----------( 266      ( 12 Aug 2024 05:30 )             41.7     08-12    139  |  106  |  11  ----------------------------<  102<H>  4.1   |  25  |  0.69    Ca    8.8      12 Aug 2024 05:30  Phos  2.6     08-12  Mg     1.9     08-12    TPro  7.1  /  Alb  3.9  /  TBili  1.1  /  DBili  x   /  AST  16  /  ALT  11  /  AlkPhos  37<L>  08-11      PT/INR - ( 11 Aug 2024 03:25 )   PT: 11.2 sec;   INR: 0.98          PTT - ( 11 Aug 2024 03:25 )  PTT:27.9 sec  Urinalysis Basic - ( 12 Aug 2024 05:30 )    Color: x / Appearance: x / SG: x / pH: x  Gluc: 102 mg/dL / Ketone: x  / Bili: x / Urobili: x   Blood: x / Protein: x / Nitrite: x   Leuk Esterase: x / RBC: x / WBC x   Sq Epi: x / Non Sq Epi: x / Bacteria: x      CAPILLARY BLOOD GLUCOSE            Urinalysis Basic - ( 12 Aug 2024 05:30 )    Color: x / Appearance: x / SG: x / pH: x  Gluc: 102 mg/dL / Ketone: x  / Bili: x / Urobili: x   Blood: x / Protein: x / Nitrite: x   Leuk Esterase: x / RBC: x / WBC x   Sq Epi: x / Non Sq Epi: x / Bacteria: x        MEDICATIONS  (STANDING):  heparin   Injectable 5000 Unit(s) SubCutaneous every 8 hours  HYDROmorphone  Injectable 0.2 milliGRAM(s) IV Push every 15 minutes  lactated ringers. 1000 milliLiter(s) (120 mL/Hr) IV Continuous <Continuous>    MEDICATIONS  (PRN):  ondansetron Injectable 4 milliGRAM(s) IV Push every 6 hours PRN Nausea      RADIOLOGY & ADDITIONAL TESTS: Reviewed   HOSPITAL COURSE: Patient is a 49M with PMHx of Crohn's disease (not currently on medications) and PSHx of SBR of terminal ileum (10 years ago) who presents to [facility] for abdominal pain. Patient reports the pain started on 8/9 and was progressively worsening. Describes the pain as an intermittent, sharp pain, mostly in lower abdomen that is 5/10 at its worst. Denied nausea or emesis. States last BM on admission was at 7 PM on 8/11 and last flatus was Friday PM. Denies fevers or chills at home. States pain is nearly identical to prior Crohn's flares.    Patient afebrile, HD normal on presentation without acute laboratory abnormalaties noted. CTAP concerning for closed loop obstruction with two distinct transition points. Now s/p dx lap, lysis of adhesions (8/11)    OVERNIGHT EVENTS: Deonna    SUBJECTIVE: Post OP day 1. Pt resting in bed, reports his abdominal pain feels improved. Pt reports he had 2 bowel movements and has been passing gas. Denies any other symptoms currently. Pt has a history of Crohn's, not on any medications as his symptoms improved following SBR of terminal ileum. He reports he was supposed to be on humira, but never had treatment. He is followed by Dr. Rodriguez at [facility], reports his last C-scope was "a few years ago" and normal. Denies family hx of CRC. Denies tobacco or drug use. Reports his only surgery was SBR of terminal ileum.     ROS: otherwise negative      T(C): 36.9 (08-12-24 @ 08:05), Max: 37.1 (08-11-24 @ 16:26)  HR: 82 (08-12-24 @ 08:05) (66 - 82)  BP: 114/74 (08-12-24 @ 08:05) (114/74 - 157/84)  RR: 16 (08-12-24 @ 08:05) (11 - 20)  SpO2: 93% (08-12-24 @ 08:05) (93% - 98%)  Wt(kg): --Vital Signs Last 24 Hrs  T(C): 36.9 (12 Aug 2024 08:05), Max: 37.1 (11 Aug 2024 16:26)  T(F): 98.5 (12 Aug 2024 08:05), Max: 98.7 (11 Aug 2024 16:26)  HR: 82 (12 Aug 2024 08:05) (66 - 82)  BP: 114/74 (12 Aug 2024 08:05) (114/74 - 157/84)  BP(mean): 97 (11 Aug 2024 11:27) (94 - 112)  RR: 16 (12 Aug 2024 08:05) (11 - 20)  SpO2: 93% (12 Aug 2024 08:05) (93% - 98%)    Parameters below as of 12 Aug 2024 08:05  Patient On (Oxygen Delivery Method): room air        PHYSICAL EXAM:  Constitutional: resting comfortably in bed; NAD  Eyes: EOMI  Respiratory: CTA B/L; no W/R/R  Cardiac: RRR; no M/R/G  Gastrointestinal: soft, mild distention. Mild post op tenderness. Incisions appear clean/dry, no signs of bleeding. NG tube in place  Extremities: WWP  Musculoskeletal: NROM x4  Vascular: 2+ posterior tibial pulses  Neurologic: AAOx3; no focal deficits  Psychiatric: affect and characteristics of appearance, verbalizations, behaviors are appropriate    LABS:                        13.7   5.47  )-----------( 266      ( 12 Aug 2024 05:30 )             41.7     08-12    139  |  106  |  11  ----------------------------<  102<H>  4.1   |  25  |  0.69    Ca    8.8      12 Aug 2024 05:30  Phos  2.6     08-12  Mg     1.9     08-12    TPro  7.1  /  Alb  3.9  /  TBili  1.1  /  DBili  x   /  AST  16  /  ALT  11  /  AlkPhos  37<L>  08-11      PT/INR - ( 11 Aug 2024 03:25 )   PT: 11.2 sec;   INR: 0.98          PTT - ( 11 Aug 2024 03:25 )  PTT:27.9 sec  Urinalysis Basic - ( 12 Aug 2024 05:30 )    Color: x / Appearance: x / SG: x / pH: x  Gluc: 102 mg/dL / Ketone: x  / Bili: x / Urobili: x   Blood: x / Protein: x / Nitrite: x   Leuk Esterase: x / RBC: x / WBC x   Sq Epi: x / Non Sq Epi: x / Bacteria: x      CAPILLARY BLOOD GLUCOSE            Urinalysis Basic - ( 12 Aug 2024 05:30 )    Color: x / Appearance: x / SG: x / pH: x  Gluc: 102 mg/dL / Ketone: x  / Bili: x / Urobili: x   Blood: x / Protein: x / Nitrite: x   Leuk Esterase: x / RBC: x / WBC x   Sq Epi: x / Non Sq Epi: x / Bacteria: x        MEDICATIONS  (STANDING):  heparin   Injectable 5000 Unit(s) SubCutaneous every 8 hours  HYDROmorphone  Injectable 0.2 milliGRAM(s) IV Push every 15 minutes  lactated ringers. 1000 milliLiter(s) (120 mL/Hr) IV Continuous <Continuous>    MEDICATIONS  (PRN):  ondansetron Injectable 4 milliGRAM(s) IV Push every 6 hours PRN Nausea      RADIOLOGY & ADDITIONAL TESTS: Reviewed

## 2024-08-12 NOTE — CONSULT NOTE ADULT - ASSESSMENT
NPO, NGT  Serial abdominal exams  F/u GI Consultation and recommendations.   Discussed with Dr Molina and with Team 5/colorectal surgery team housestaff

## 2024-08-12 NOTE — CONSULT NOTE ADULT - ASSESSMENT
49M h/o Crohns (dx 2021, not on meds), TI resection (~10 yrs ago) p/w abd pain x1d.     Initial symptoms were similar to prior Crohns flares. CT was concerning for closed-loop obstruction, and pt was taken for ex-lap which showed no signs of closed loop obstruction, but SBO with stricture at anastomosis. Pt reports improvement in his abdominal pain currently, passing flatus and had BM x2 today. His abdomen is slightly distended, with mild post op tenderness. No other symptoms currently.     HIV and hepatitis B panel negative, quantiferon pending.    #Small bowel obstruction, secondary to stricture (inflammatory vs fibrostenotic)  - Follow up quantiferon  - Send fecal calprotectin, if pt develops diarrhea check C. diff  - Continue to monitor clinical improvement, quantiferon pending continue to hold methylpred 20mg TID  - C-Scope records from Dr. Rodriguez   *****INCOMPLETE NOTE*******    49M h/o Crohns (dx 2021, not on meds), TI resection (~10 yrs ago) p/w abd pain x1d.     Initial symptoms were similar to prior Crohns flares. CT was concerning for closed-loop obstruction, and pt was taken for ex-lap which showed no signs of closed loop obstruction, but SBO with stricture at anastomosis. Pt reports improvement in his abdominal pain currently, passing flatus and had BM x2 today. His abdomen is slightly distended, with mild post op tenderness. No other symptoms currently.     HIV and hepatitis B panel negative, quantiferon pending.    #Small bowel obstruction, secondary to stricture (inflammatory vs fibrostenotic)  - Follow up quantiferon  - Send fecal calprotectin, if pt develops diarrhea check C. diff  - Continue to monitor clinical improvement, quantiferon pending continue to hold methylpred 20mg TID  - C-Scope records from Dr. Rodriguez   *****INCOMPLETE NOTE*******    49M h/o Crohns (not on meds), TI resection (~10 yrs ago) p/w abd pain x1d.     Initial symptoms were similar to prior Crohns flares. CT was concerning for closed-loop obstruction, and pt was taken for ex-lap which showed no signs of closed loop obstruction, but SBO with stricture at anastomosis. Pt reports improvement in his abdominal pain currently, passing flatus and had BM x2 today. His abdomen is slightly distended, with mild post op tenderness. No other symptoms currently.     HIV and hepatitis B panel negative, quantiferon pending.    #Small bowel obstruction, secondary to stricture (inflammatory vs fibrostenotic)  - Follow up quantiferon  - Send fecal calprotectin, if pt develops diarrhea check C. diff  - Continue to monitor clinical improvement, quantiferon pending continue to hold methylpred 20mg TID  - C-Scope records from Dr. Rodriguez   *****INCOMPLETE NOTE*******    49M h/o Crohns (not on meds), TI resection (~10 yrs ago) p/w abd pain x1d.     Initial symptoms were similar to prior Crohns flares. CT was concerning for closed-loop obstruction, and pt was taken for ex-lap which showed no signs of closed loop obstruction, but SBO with stricture at anastomosis. Pt reports improvement in his abdominal pain currently, passing flatus and had BM x2 today. His abdomen is slightly distended, with mild post op tenderness. No other symptoms currently.     HIV and hepatitis B panel negative, quantiferon pending.    #Small bowel obstruction, secondary to stricture (inflammatory vs fibrostenotic)  -Continue to trend CRP  - Send fecal calprotectin, if pt develops diarrhea check C. diff  - Follow up quantiferon  - Continue to monitor clinical improvement, quantiferon pending continue to hold methylpred 20mg TID  - C-Scope records from Dr. Rodriguez. No need for urgent C-scope at this point. Patient will need C-scope in 6-12 months following resolution of obstruction    Note written in conjunction with internal medicine resident Christiano Cortes. This is a 49 year old male with stricturing ileocolonic Crohn's disease (diagnosed about 2 years prior to this presentation, previous exposure to ADA, ileocolic resection with anastomosis about 10 years age) who gastroenterology has been consulted for bowel obstruction. Original concern at time of presentation was for closed loop obstruction, underwent ex-lap and was not found to have signs of closed loop obstruction. Underwent lysis of adhesions and placed on hospital floor.     #Acute small bowel obstruction secondary to ileocolonic anastomotic stricture  - Unclear if worsening of anastomotic stricture is related to fibrosis vs inflammation. Patient's previous ileocolic resection about 10 years prior and this interval is the typical time needed for reintervention. Patient with some findings indicative of at least some ongoing inflammation (elevated CRP, some thick loops of bowel).   - For now, will initiate IV methylprednisolone 20mg three times daily. Ideally if there is an inflammatory component to patient's obstruction, should have some symptomatic improvement within 48 hours. If improvement (identified through increased bowel movement frequency, decreasing abdominal pain), will consider inpatient colonoscopy to evaluate colon and anastomosis. If patient fails to have clinical improvement with 48 hours IV steroids, patient may require resection of stricture.   - Continue NG tube to low intermittent suction  - Fecal C. diff and GI PCR negative  - Postoperative care per surgery team    Note written in conjunction with internal medicine resident Christiano Cortes.

## 2024-08-13 LAB
ANION GAP SERPL CALC-SCNC: 9 MMOL/L — SIGNIFICANT CHANGE UP (ref 5–17)
BUN SERPL-MCNC: 13 MG/DL — SIGNIFICANT CHANGE UP (ref 7–23)
CALCIUM SERPL-MCNC: 9 MG/DL — SIGNIFICANT CHANGE UP (ref 8.4–10.5)
CHLORIDE SERPL-SCNC: 104 MMOL/L — SIGNIFICANT CHANGE UP (ref 96–108)
CO2 SERPL-SCNC: 26 MMOL/L — SIGNIFICANT CHANGE UP (ref 22–31)
CREAT SERPL-MCNC: 0.7 MG/DL — SIGNIFICANT CHANGE UP (ref 0.5–1.3)
CRP SERPL-MCNC: 28.4 MG/L — HIGH (ref 0–4)
EGFR: 114 ML/MIN/1.73M2 — SIGNIFICANT CHANGE UP
GLUCOSE SERPL-MCNC: 111 MG/DL — HIGH (ref 70–99)
HCT VFR BLD CALC: 39.9 % — SIGNIFICANT CHANGE UP (ref 39–50)
HGB BLD-MCNC: 12.8 G/DL — LOW (ref 13–17)
MAGNESIUM SERPL-MCNC: 2 MG/DL — SIGNIFICANT CHANGE UP (ref 1.6–2.6)
MCHC RBC-ENTMCNC: 26.9 PG — LOW (ref 27–34)
MCHC RBC-ENTMCNC: 32.1 GM/DL — SIGNIFICANT CHANGE UP (ref 32–36)
MCV RBC AUTO: 84 FL — SIGNIFICANT CHANGE UP (ref 80–100)
NRBC # BLD: 0 /100 WBCS — SIGNIFICANT CHANGE UP (ref 0–0)
PHOSPHATE SERPL-MCNC: 3.2 MG/DL — SIGNIFICANT CHANGE UP (ref 2.5–4.5)
PLATELET # BLD AUTO: 274 K/UL — SIGNIFICANT CHANGE UP (ref 150–400)
POTASSIUM SERPL-MCNC: 4.2 MMOL/L — SIGNIFICANT CHANGE UP (ref 3.5–5.3)
POTASSIUM SERPL-SCNC: 4.2 MMOL/L — SIGNIFICANT CHANGE UP (ref 3.5–5.3)
RBC # BLD: 4.75 M/UL — SIGNIFICANT CHANGE UP (ref 4.2–5.8)
RBC # FLD: 13.1 % — SIGNIFICANT CHANGE UP (ref 10.3–14.5)
SODIUM SERPL-SCNC: 139 MMOL/L — SIGNIFICANT CHANGE UP (ref 135–145)
WBC # BLD: 4.54 K/UL — SIGNIFICANT CHANGE UP (ref 3.8–10.5)
WBC # FLD AUTO: 4.54 K/UL — SIGNIFICANT CHANGE UP (ref 3.8–10.5)

## 2024-08-13 PROCEDURE — 99232 SBSQ HOSP IP/OBS MODERATE 35: CPT | Mod: GC

## 2024-08-13 RX ADMIN — Medication 20 MILLIGRAM(S): at 16:08

## 2024-08-13 RX ADMIN — Medication 20 MILLIGRAM(S): at 07:29

## 2024-08-13 RX ADMIN — Medication 1 PATCH: at 07:10

## 2024-08-13 RX ADMIN — Medication 5000 UNIT(S): at 16:08

## 2024-08-13 RX ADMIN — Medication 5000 UNIT(S): at 07:29

## 2024-08-13 NOTE — DISCHARGE NOTE PROVIDER - NSDCCPTREATMENT_GEN_ALL_CORE_FT
PRINCIPAL PROCEDURE  Procedure: Excision of part of colon and excision of terminal ileum with ileocolic anastomosis  Findings and Treatment:

## 2024-08-13 NOTE — DISCHARGE NOTE PROVIDER - NSDCFUSCHEDAPPT_GEN_ALL_CORE_FT
Dotty Cloud  [facility] Physician [facility]  [facility] MAIN [address]  Scheduled Appointment: 08/21/2024

## 2024-08-13 NOTE — PROGRESS NOTE ADULT - SUBJECTIVE AND OBJECTIVE BOX
HX: POD 2 s/p Diagnostic lap and lysis of adhesions     SUBJECTIVE: Patient seen and examined bedside by chief resident. Patient refers feeling okay. Denies nausea, vomiting, CP, SOB.    heparin   Injectable 5000 Unit(s) SubCutaneous every 8 hours    MEDICATIONS  (PRN):  ondansetron Injectable 4 milliGRAM(s) IV Push every 6 hours PRN Nausea      I&O's Detail    12 Aug 2024 07:01  -  13 Aug 2024 07:00  --------------------------------------------------------  IN:    IV PiggyBack: 100 mL    Lactated Ringers: 2640 mL  Total IN: 2740 mL    OUT:    Nasogastric/Oral tube (mL): 1250 mL    Stool (mL): 25 mL    Voided (mL): 750 mL  Total OUT: 2025 mL    Total NET: 715 mL          Vital Signs Last 24 Hrs  T(C): 36.5 (13 Aug 2024 05:46), Max: 37 (12 Aug 2024 16:22)  T(F): 97.7 (13 Aug 2024 05:46), Max: 98.6 (12 Aug 2024 16:22)  HR: 85 (13 Aug 2024 05:46) (79 - 85)  BP: 116/70 (13 Aug 2024 05:46) (112/74 - 119/75)  BP(mean): 83 (12 Aug 2024 20:53) (83 - 83)  RR: 18 (13 Aug 2024 05:46) (16 - 18)  SpO2: 95% (13 Aug 2024 05:46) (93% - 95%)    Parameters below as of 13 Aug 2024 05:46  Patient On (Oxygen Delivery Method): room air        PHYSICAL EXAM:   Gen: NAD, resting comfortably, NGT in place to LIWS   CV: NSR  Pulm: no respiratory distress on RA, CTAB   Abd: Soft, moderately distended, NTTP, no rebound or guarding. Incisions C/D/I.   Ext: WWP, no edema   Neuro: motor/sensory grossly intact     LABS:                        13.7   5.47  )-----------( 266      ( 12 Aug 2024 05:30 )             41.7     08-12    139  |  106  |  11  ----------------------------<  102<H>  4.1   |  25  |  0.69    Ca    8.8      12 Aug 2024 05:30  Phos  2.6     08-12  Mg     1.9     08-12          CAPILLARY BLOOD GLUCOSE            Urinalysis Basic - ( 12 Aug 2024 05:30 )    Color: x / Appearance: x / SG: x / pH: x  Gluc: 102 mg/dL / Ketone: x  / Bili: x / Urobili: x   Blood: x / Protein: x / Nitrite: x   Leuk Esterase: x / RBC: x / WBC x   Sq Epi: x / Non Sq Epi: x / Bacteria: x        Culture - Urine (collected 11 Aug 2024 01:02)  Source: Clean Catch Clean Catch (Midstream)  Final Report (12 Aug 2024 07:18):    <10,000 CFU/mL Normal Urogenital Sita           RADIOLOGY & ADDITIONAL STUDIES:  CT Abdomen and Pelvis w/ Oral Cont and w/ IV Cont:   ACC: [id number] EXAM:  CT ABDOMEN AND PELVIS OC IC   ORDERED BY: ZEFERINO CHRISTOPHER     PROCEDURE DATE:  08/11/2024          INTERPRETATION:  CLINICAL INFORMATION: abd distention, ttp, hx crohns w/   resection   10yrs ago    COMPARISON: None.    CONTRAST/COMPLICATIONS:  IV Contrast: Isovue 370  90 cc administered   10 cc discarded  Oral Contrast: Omnipaque 300  Complications: None reported at time of study completion    PROCEDURE:  CT of the Abdomen and Pelvis was performed.  Sagittal and coronalreformats were performed.    FINDINGS:  LOWER CHEST: Within normal limits.    LIVER: Hepatomegaly.Few hypodensities are too small to characterize.  BILE DUCTS: Normal caliber.  GALLBLADDER: Within normal limits.  SPLEEN: Few hypodensities are too small to characterize.  PANCREAS: Within normal limits.  ADRENALS: Within normal limits.  KIDNEYS/URETERS: Mild fullness of both renal collecting systems without   evidence of obstructing stone.    BLADDER: Mild wall thickening.  REPRODUCTIVE ORGANS: Prostate within normal limits.    BOWEL: There are multiple distended small bowel loops throughout the   abdomen. Enteric contrast reaches small bowel in the left lower abdomen.   Ileocolic anastomosis is noted. Distal ileum at the level of anastomosis   appears thickened with a transition zone identified at the level of the   anastomosis. Small bowel loops proximal to the anastomosis are thickened.   A second transition point is identified proximal to a distended small   bowel loop centrally in the abdomen. Additional contrast filled and   distended small bowel loops are noted in the left abdomen. No   pneumatosis. Appendix is absent.  PERITONEUM/RETROPERITONEUM: Small abdominopelvic ascites. Mild mesenteric   edema. No free air.  VESSELS: Within normal limits.  LYMPH NODES: Several mildly prominent mesenteric lymph nodes are noted,   probably reactive.  ABDOMINAL WALL: Within normal limits.  BONES: Within normal limits.    IMPRESSION:  Status post small bowel resection with ileocolic anastomosis. Distended   and thickened small bowel loops proximal to the anastomosis with   transition zone identified at the level of the anastomosis and an   additional transition point identified more proximally. Findings are   suspicious for a closed loop small bowel obstruction. Wall thickening of   the distended small bowel loops is consistent with enteritis which could   indicate early ischemia versus inflammatory bowel disease.    Mild bladder wall thickening could indicate cystitis which may be  reactive.        --- End of Report ---            MALOU ARREOLA MD; Attending Radiologist  This document has been electronically signed. Aug 11 2024  3:15AM (08-11-24 @ 02:48)        Plan discussed with attending and chief resident.   ____________________________________________________  Kylah Garcia - Resident   Surgery  HX: POD 2 s/p Diagnostic lap and lysis of adhesions     SUBJECTIVE: Patient seen and examined bedside by chief resident. Patient refers feeling okay. Denies nausea, vomiting, CP, SOB.    heparin   Injectable 5000 Unit(s) SubCutaneous every 8 hours    MEDICATIONS  (PRN):  ondansetron Injectable 4 milliGRAM(s) IV Push every 6 hours PRN Nausea      I&O's Detail    12 Aug 2024 07:01  -  13 Aug 2024 07:00  --------------------------------------------------------  IN:    IV PiggyBack: 100 mL    Lactated Ringers: 2640 mL  Total IN: 2740 mL    OUT:    Nasogastric/Oral tube (mL): 1250 mL    Stool (mL): 25 mL    Voided (mL): 750 mL  Total OUT: 2025 mL    Total NET: 715 mL          Vital Signs Last 24 Hrs  T(C): 36.5 (13 Aug 2024 05:46), Max: 37 (12 Aug 2024 16:22)  T(F): 97.7 (13 Aug 2024 05:46), Max: 98.6 (12 Aug 2024 16:22)  HR: 85 (13 Aug 2024 05:46) (79 - 85)  BP: 116/70 (13 Aug 2024 05:46) (112/74 - 119/75)  BP(mean): 83 (12 Aug 2024 20:53) (83 - 83)  RR: 18 (13 Aug 2024 05:46) (16 - 18)  SpO2: 95% (13 Aug 2024 05:46) (93% - 95%)    Parameters below as of 13 Aug 2024 05:46  Patient On (Oxygen Delivery Method): room air        PHYSICAL EXAM:   Gen: NAD, resting comfortably, NGT in place to LIWS   CV: NSR  Pulm: no respiratory distress on RA, CTAB   Abd: Soft, moderately distended, NTTP, no rebound or guarding. Incisions C/D/I.   Ext: WWP, no edema   Neuro: motor/sensory grossly intact     LABS:                        13.7   5.47  )-----------( 266      ( 12 Aug 2024 05:30 )             41.7     08-12    139  |  106  |  11  ----------------------------<  102<H>  4.1   |  25  |  0.69    Ca    8.8      12 Aug 2024 05:30  Phos  2.6     08-12  Mg     1.9     08-12          CAPILLARY BLOOD GLUCOSE            Urinalysis Basic - ( 12 Aug 2024 05:30 )    Color: x / Appearance: x / SG: x / pH: x  Gluc: 102 mg/dL / Ketone: x  / Bili: x / Urobili: x   Blood: x / Protein: x / Nitrite: x   Leuk Esterase: x / RBC: x / WBC x   Sq Epi: x / Non Sq Epi: x / Bacteria: x        Culture - Urine (collected 11 Aug 2024 01:02)  Source: Clean Catch Clean Catch (Midstream)  Final Report (12 Aug 2024 07:18):    <10,000 CFU/mL Normal Urogenital Sita           RADIOLOGY & ADDITIONAL STUDIES:  CT Abdomen and Pelvis w/ Oral Cont and w/ IV Cont:   ACC: [id number] EXAM:  CT ABDOMEN AND PELVIS OC IC   ORDERED BY: ZEFERINO CHRISTOPHER     PROCEDURE DATE:  08/11/2024          INTERPRETATION:  CLINICAL INFORMATION: abd distention, ttp, hx crohns w/   resection   10yrs ago    COMPARISON: None.    CONTRAST/COMPLICATIONS:  IV Contrast: Isovue 370  90 cc administered   10 cc discarded  Oral Contrast: Omnipaque 300  Complications: None reported at time of study completion    PROCEDURE:  CT of the Abdomen and Pelvis was performed.  Sagittal and coronalreformats were performed.    FINDINGS:  LOWER CHEST: Within normal limits.    LIVER: Hepatomegaly.Few hypodensities are too small to characterize.  BILE DUCTS: Normal caliber.  GALLBLADDER: Within normal limits.  SPLEEN: Few hypodensities are too small to characterize.  PANCREAS: Within normal limits.  ADRENALS: Within normal limits.  KIDNEYS/URETERS: Mild fullness of both renal collecting systems without   evidence of obstructing stone.    BLADDER: Mild wall thickening.  REPRODUCTIVE ORGANS: Prostate within normal limits.    BOWEL: There are multiple distended small bowel loops throughout the   abdomen. Enteric contrast reaches small bowel in the left lower abdomen.   Ileocolic anastomosis is noted. Distal ileum at the level of anastomosis   appears thickened with a transition zone identified at the level of the   anastomosis. Small bowel loops proximal to the anastomosis are thickened.   A second transition point is identified proximal to a distended small   bowel loop centrally in the abdomen. Additional contrast filled and   distended small bowel loops are noted in the left abdomen. No   pneumatosis. Appendix is absent.  PERITONEUM/RETROPERITONEUM: Small abdominopelvic ascites. Mild mesenteric   edema. No free air.  VESSELS: Within normal limits.  LYMPH NODES: Several mildly prominent mesenteric lymph nodes are noted,   probably reactive.  ABDOMINAL WALL: Within normal limits.  BONES: Within normal limits.    IMPRESSION:  Status post small bowel resection with ileocolic anastomosis. Distended   and thickened small bowel loops proximal to the anastomosis with   transition zone identified at the level of the anastomosis and an   additional transition point identified more proximally. Findings are   suspicious for a closed loop small bowel obstruction. Wall thickening of   the distended small bowel loops is consistent with enteritis which could   indicate early ischemia versus inflammatory bowel disease.    Mild bladder wall thickening could indicate cystitis which may be  reactive.        --- End of Report ---            MALOU ARREOLA MD; Attending Radiologist  This document has been electronically signed. Aug 11 2024  3:15AM (08-11-24 @ 02:48)

## 2024-08-13 NOTE — PROGRESS NOTE ADULT - ATTENDING COMMENTS
CRS Attending  Seen and examined on am rounds  Patient reports he feels improved.  Abdominal pain minimal/improved  Denies nausea or vomiting  Passed flatus yesterday and this morning  Had BM yesterday  NGT output >1L in 24hrs after repositioning of tube, output decreased overnight  Gen NAD  Abdomen softly distended, mild tenderness to deep palpation bao-incisional, no rebound or guarding  Having GI function; Maintain NGT for now and trend output volume  Continue steroids per GI  OOB, ambulate  Discussed with surgical housestaff

## 2024-08-13 NOTE — PROGRESS NOTE ADULT - ASSESSMENT
49M with PMHx of Crohn's disease (diagnosed in 2021, not currently on medications) and PSHx of SBR of terminal ileum (10 years ago) who presents to [facility] for abdominal pain. Patient afebrile, HD normal on presentation without acute laboratory abnormalaties noted. CTAP concerning for closed loop obstruction with two distinct transition points. Now s/p dx lap, lysis of adhesions (8/11)      NPO/IVF  s/p dx lap, no closed loop obstruction  NGT LIWS  HSQ/SCDs/IS/OOBA  Methylprednisolone 20mg TID   49M with PMHx of Crohn's disease (diagnosed in 2021, not currently on medications) and PSHx of SBR of terminal ileum (10 years ago) who presents to [facility] for abdominal pain. Patient afebrile, HD normal on presentation without acute laboratory abnormalaties noted. CTAP concerning for closed loop obstruction with two distinct transition points. Now s/p dx lap, lysis of adhesions (8/11)      NPO/IVF  s/p dx lap, no closed loop obstruction  NGT LIWS  HSQ/SCDs/IS/OOBA  Methylprednisolone 20mg TID    Plan discussed with attending and chief resident.   ____________________________________________________  Kylah Garcia - Resident   Surgery

## 2024-08-13 NOTE — DISCHARGE NOTE PROVIDER - NSDCFUADDINST_GEN_ALL_CORE_FT
General Discharge Instructions:  Please resume all regular home medications unless specifically advised not to take a particular medication. Also, please take any new medications as prescribed.  Please get plenty of rest, continue to ambulate several times per day, and drink adequate amounts of fluids. Avoid lifting weights greater than 5-10 lbs until you follow-up with your surgeon, who will instruct you further regarding activity restrictions.  Avoid driving or operating heavy machinery while taking pain medications.  Please follow-up with your surgeon and Primary Care Provider (PCP) as advised.  Incision Care:  *Please call your doctor or nurse practitioner if you have increased pain, swelling, redness, or drainage from the incision site.  *Avoid swimming and baths until your follow-up appointment.  *You may shower, and wash surgical incisions with a mild soap and warm water. Gently pat the area dry.    Warning Signs:  Please call your doctor or nurse practitioner if you experience the following:  *You experience new chest pain, pressure, squeezing or tightness.  *New or worsening cough, shortness of breath, or wheeze.  *If you are vomiting and cannot keep down fluids or your medications.  *You are getting dehydrated due to continued vomiting, diarrhea, or other reasons. Signs of dehydration include dry mouth, rapid heartbeat, or feeling dizzy or faint when standing.  *You see blood or dark/black material when you vomit or have a bowel movement.  *You experience burning when you urinate, have blood in your urine, or experience a discharge.  *Your pain is not improving within 8-12 hours or is not gone within 24 hours. Call or return immediately if your pain is getting worse, changes location, or moves to your chest or back.  *You have shaking chills, or fever greater than 101.5 degrees Fahrenheit or 38 degrees Celsius.  *Any change in your symptoms, or any new symptoms that concern you.    Please continue a LOW-FIBER DIET. Listed below are some foods you may eat and those you should avoid.   --Allowed foods:  White bread without nuts and seeds  White rice, plain white pasta, and crackers  Refined hot cereals, such as Cream of Wheat, or cold cereals with less than 1 gram of fiber per serving  Pancakes or waffles made from white refined flour  Most canned or well-cooked vegetables and fruits without skins or seeds  Fruit and vegetable juice with little or no pulp, fruit-flavored drinks, and flavored quigley  Tender meat, poultry, fish, eggs and tofu  Milk and foods made from milk — such as yogurt, pudding, ice cream, cheeses and sour cream — if tolerated  Butter, margarine, oils and salad dressings without seeds  --Foods to avoid:  Whole-wheat or whole-grain breads, cereals and pasta  Brown or wild rice and other whole grains, such as oats, kasha, barley and quinoa  Dried fruits and prune juice  Raw fruit, including those with seeds, skin or membranes, such as berries  Raw or undercooked vegetables, including corn  Dried beans, peas and lentils  Seeds and nuts and foods containing them, including peanut butter and other nut butters  Coconut  Popcorn     Follow up with Dr. Cloud in 2 weeks. Call the office at the number below to schedule your appointment.     General Discharge Instructions:  Please resume all regular home medications unless specifically advised not to take a particular medication. Also, please take any new medications as prescribed.  Please get plenty of rest, continue to ambulate several times per day, and drink adequate amounts of fluids. Avoid lifting weights greater than 5-10 lbs until you follow-up with your surgeon, who will instruct you further regarding activity restrictions.  Avoid driving or operating heavy machinery while taking pain medications.  Please follow-up with your surgeon and Primary Care Provider (PCP) as advised.  Incision Care:  *Please call your doctor or nurse practitioner if you have increased pain, swelling, redness, or drainage from the incision site.  *Avoid swimming and baths until your follow-up appointment.  *You may shower, and wash surgical incisions with a mild soap and warm water. Gently pat the area dry.    Warning Signs:  Please call your doctor or nurse practitioner if you experience the following:  *You experience new chest pain, pressure, squeezing or tightness.  *New or worsening cough, shortness of breath, or wheeze.  *If you are vomiting and cannot keep down fluids or your medications.  *You are getting dehydrated due to continued vomiting, diarrhea, or other reasons. Signs of dehydration include dry mouth, rapid heartbeat, or feeling dizzy or faint when standing.  *You see blood or dark/black material when you vomit or have a bowel movement.  *You experience burning when you urinate, have blood in your urine, or experience a discharge.  *Your pain is not improving within 8-12 hours or is not gone within 24 hours. Call or return immediately if your pain is getting worse, changes location, or moves to your chest or back.  *You have shaking chills, or fever greater than 101.5 degrees Fahrenheit or 38 degrees Celsius.  *Any change in your symptoms, or any new symptoms that concern you.    Please continue a LOW-FIBER DIET. Listed below are some foods you may eat and those you should avoid.   --Allowed foods:  White bread without nuts and seeds  White rice, plain white pasta, and crackers  Refined hot cereals, such as Cream of Wheat, or cold cereals with less than 1 gram of fiber per serving  Pancakes or waffles made from white refined flour  Most canned or well-cooked vegetables and fruits without skins or seeds  Fruit and vegetable juice with little or no pulp, fruit-flavored drinks, and flavored quigley  Tender meat, poultry, fish, eggs and tofu  Milk and foods made from milk — such as yogurt, pudding, ice cream, cheeses and sour cream — if tolerated  Butter, margarine, oils and salad dressings without seeds  --Foods to avoid:  Whole-wheat or whole-grain breads, cereals and pasta  Brown or wild rice and other whole grains, such as oats, kasha, barley and quinoa  Dried fruits and prune juice  Raw fruit, including those with seeds, skin or membranes, such as berries  Raw or undercooked vegetables, including corn  Dried beans, peas and lentils  Seeds and nuts and foods containing them, including peanut butter and other nut butters  Coconut  Popcorn

## 2024-08-13 NOTE — DISCHARGE NOTE PROVIDER - HOSPITAL COURSE
49M with PMHx of Crohn's disease (diagnosed in 2021, not currently on medications) and PSHx of SBR of terminal ileum (10 years ago) who presents to [facility] for abdominal pain. Patient afebrile, HD normal on presentation without acute laboratory abnormalities noted. CTAP concerning for closed loop obstruction with two distinct transition points. Patient underwent a diagnostic laparoscopy and lysis of adhesions on 8/11/2024. Post-operatively,        *** Up to 8/13**** 49M with PMHx of Crohn's disease (diagnosed in 2021, not currently on medications) and PSHx of SBR of terminal ileum (10 years ago) who presents to [facility] for abdominal pain. Patient afebrile, HD normal on presentation without acute laboratory abnormalities noted. CTAP concerning for closed loop obstruction with two distinct transition points. Patient underwent a diagnostic laparoscopy and lysis of adhesions on 8/11/2024. Post-operatively, patient was doing well. Gastroenterology was consulted for management of Crohn's, they recommended starting IV methylprednisolone 20mg TID for 48hrs, afterwards would consider colonoscopy during this admission.         *** Up to 8/13****   49M with PMHx of Crohn's disease (diagnosed in 2021, not currently on medications) and PSHx of SBR of terminal ileum (10 years ago) who presents to [facility] for abdominal pain. Patient afebrile, HD normal on presentation without acute laboratory abnormalities noted. CTAP concerning for closed loop obstruction with two distinct transition points. Patient underwent a diagnostic laparoscopy and lysis of adhesions on 8/11/2024. Post-operatively, patient doing well, with NGT, having bowel function. Gastroenterology was consulted for management of Crohn's, they recommended starting IV methylprednisolone 20mg TID for 48hrs, and depending on clinical course would consider colonoscopy during this admission.         *** Up to 8/13****   49M with PMHx of Crohn's disease (diagnosed in 2021, not currently on medications) and PSHx of SBR of terminal ileum (10 years ago) who presents to [facility] for abdominal pain. Patient afebrile, HD normal on presentation without acute laboratory abnormalities noted. CTAP concerning for closed loop obstruction with two distinct transition points. Patient underwent a diagnostic laparoscopy and lysis of adhesions on 8/11/2024. Post-operatively, patient doing well, with NGT, having flatus and small bowel movements. Gastroenterology was consulted for management of Crohn's, they recommended starting IV methylprednisolone 20mg TID for 48hrs, and depending on clinical course would consider colonoscopy during this admission. On 8/14, patient was taken for colonoscopy, but was unsuccessful due to poor prep. On 8/16, CT enterography was done, which showed persistently dilated small bowel dilation proximal to the ileocolic anastomosis, with interval improvement of terminal ileal and distal ileal wall thickening and edema. Additionally, on 8/16, PICC line was placed for TPN, which was started on 8/17. Patient continued with NGT until 8/18, which after passing a clamping trial, NGT was discontinued. Patient tolerating clear liquid diet, passing flatus, and having small bowel movements. GI was reconsulted for possible repeat colonoscopy, which was ____done?____ on 8/20.     *** Up to 8/19****   49M with PMHx of Crohn's disease (diagnosed in 2021, not currently on medications) and PSHx of SBR of terminal ileum (10 years ago) who presents to [facility] for abdominal pain. Patient afebrile, HD normal on presentation without acute laboratory abnormalities noted. CTAP concerning for closed loop obstruction with two distinct transition points. Patient underwent a diagnostic laparoscopy and lysis of adhesions on 8/11/2024. Post-operatively, patient doing well, with NGT, having flatus and small bowel movements. Gastroenterology was consulted for management of Crohn's, they recommended starting IV methylprednisolone 20mg TID for 48hrs. On 8/16, CT enterography was done, which showed persistently dilated small bowel dilation proximal to the ileocolic anastomosis, with interval improvement of terminal ileal and distal ileal wall thickening and edema. Additionally, on 8/16, PICC line was placed for TPN. Patient continued with NGT until 8/18, which after passing a clamping trial, NGT was discontinued. On 8/20, a colonoscopy was done, which showed stricture at the previous ileocolic anastomosis. On 8/21, patient underwent a diagnostic lap with resection of strictured ileocolic anastomosis and new ileocolic anastomosis. Diet was advanced and TPN was _____.   ***up to 8/25    His postoperative course was unremarkable with advancement of diet, passing trial of void, and pain control. On day of discharge patient was stable to be d/c'd home.         49M with PMHx of Crohn's disease (diagnosed in 2021, not currently on medications) and PSHx of SBR of terminal ileum (10 years ago) who presents to [facility] for abdominal pain. Patient afebrile, HD normal on presentation without acute laboratory abnormalities noted. CTAP concerning for closed loop obstruction with two distinct transition points. Patient underwent a diagnostic laparoscopy and lysis of adhesions on 8/11/2024. Post-operatively, patient doing well, with NGT, having flatus and small bowel movements. Gastroenterology was consulted for management of Crohn's, they recommended starting IV methylprednisolone 20mg TID for 48hrs. On 8/16, CT enterography was done, which showed persistently dilated small bowel dilation proximal to the ileocolic anastomosis, with interval improvement of terminal ileal and distal ileal wall thickening and edema. Additionally, on 8/16, PICC line was placed for TPN. Patient continued with NGT until 8/18, which after passing a clamping trial, NGT was discontinued. On 8/20, a colonoscopy was done, which showed stricture at the previous ileocolic anastomosis. On 8/21, patient underwent a diagnostic lap with resection of strictured ileocolic anastomosis and new ileocolic anastomosis. Diet was advanced and TPN was discontinued on 8/27. His postoperative course was unremarkable with advancement of diet, passing trial of void, and pain control. On day of discharge patient was stable to be d/c'd home.

## 2024-08-13 NOTE — PROGRESS NOTE ADULT - ASSESSMENT
********INCOMPLETE NOTE**************    This is a 49 year old male with stricturing ileocolonic Crohn's disease (diagnosed about 2 years prior to this presentation, previous exposure to ADA, ileocolic resection with anastomosis about 10 years age) who gastroenterology has been consulted for bowel obstruction. Original concern at time of presentation was for closed loop obstruction, underwent ex-lap and was not found to have signs of closed loop obstruction. Underwent lysis of adhesions and placed on hospital floor.     #Acute small bowel obstruction secondary to ileocolonic anastomotic stricture  - Unclear if worsening of anastomotic stricture is related to fibrosis vs inflammation. Patient's previous ileocolic resection about 10 years prior and this interval is the typical time needed for reintervention. Patient with some findings indicative of at least some ongoing inflammation (elevated CRP, some thick loops of bowel).   -Continue with IV methylprednisolone 20mg TID. Pt reports improving abdominal pain. Last BM 8/12, passing flatus currently. CRP on 8/13 28.4, fecal calprotectin pending. Discussed possibility of colonoscopy during this hospitalization with the patient.   - Continue NG tube  - Postoperative care per surgery team CARE TRANSITIONS (HRTIC) NOTE    Attempted to contact patient for final call in Care Transitions Program. Unable to reach. Left a voicemail with instructions to call back if my services are needed. Will close the case at this time. No readmissions in 30 days.    ********INCOMPLETE NOTE**************    This is a 49 year old male with stricturing ileocolonic Crohn's disease (diagnosed about 2 years prior to this presentation, previous exposure to ADA, ileocolic resection with anastomosis about 12 years age) who gastroenterology has been consulted for bowel obstruction. Original concern at time of presentation was for closed loop obstruction, underwent ex-lap and was not found to have signs of closed loop obstruction. Underwent lysis of adhesions and placed on hospital floor.     #Acute small bowel obstruction secondary to ileocolonic anastomotic stricture  - Unclear if worsening of anastomotic stricture is related to fibrosis vs inflammation. Patient's previous ileocolic resection about 10 years prior and this interval is the typical time needed for reintervention. Patient with some findings indicative of at least some ongoing inflammation (elevated CRP, some thick loops of bowel).   -Continue with IV methylprednisolone 20mg TID. Pt reports improving abdominal pain. Last BM 8/12, passing flatus currently. CRP on 8/13 28.4, fecal calprotectin pending. Discussed possibility of colonoscopy during this hospitalization with the patient.   - Continue NG tube  - Postoperative care per surgery team    Note written in conjunction with internal medicine resident Christiano Cortes This is a 49 year old male with stricturing ileocolonic Crohn's disease (diagnosed about 2 years prior to this presentation, previous exposure to ADA, ileocolic resection with anastomosis about 12 years age) who gastroenterology has been consulted for bowel obstruction. Original concern at time of presentation was for closed loop obstruction, underwent ex-lap and was not found to have signs of closed loop obstruction. Underwent lysis of adhesions and placed on hospital floor.     #Acute small bowel obstruction secondary to ileocolonic anastomotic stricture  #Stricturing ileocolonic Crohn's disease  - Unclear if worsening of anastomotic stricture is related to fibrosis vs inflammation. Patient's previous ileocolic resection about 10 years prior and this interval is the typical time needed for reintervention. Patient with some findings indicative of at least some ongoing inflammation (elevated CRP, some thick loops of bowel).   - Continue with IV methylprednisolone 20mg TID. Pt reports improving abdominal pain however still no solid bowel movements, only air passage. Hopeful patient begins having bowel movements so he may tolerate bowel prep for colonoscopy. If not, patient may require surgical intervention for strictured area/obstruction. Discussed possibility of colonoscopy during this hospitalization with the patient.   - Continue NG tube to low intermittent suction  - Postoperative care per surgery team    Note written in conjunction with internal medicine resident Christiano Harkins DO  Gastroenterology Fellow  GI Consult Pager Weekdays 7am-5pm: [phone]  Personal Pager Weekdays 7am-5pm: [phone]  Weeknights/Weekend/Holiday Coverage: Please Call the  for Contact Information  Follow Up Questions Welcome via [facility] Microsoft Teams Messenger

## 2024-08-13 NOTE — DISCHARGE NOTE PROVIDER - NSDCMRMEDTOKEN_GEN_ALL_CORE_FT
Tylenol Extra Strength 500 mg oral tablet: 2 tab(s) orally every 6 hours as needed for  moderate pain MDD: 4000mg

## 2024-08-13 NOTE — DISCHARGE NOTE PROVIDER - CARE PROVIDER_API CALL
Dotty Cloud  Surgery  [facility] [facility]  [place] [address]  Phone: [phone]  Fax: [phone]  Follow Up Time:

## 2024-08-13 NOTE — DISCHARGE NOTE PROVIDER - DETAILS OF MALNUTRITION DIAGNOSIS/DIAGNOSES
This patient has been assessed with a concern for Malnutrition and was treated during this hospitalization for the following Nutrition diagnosis/diagnoses:     -  08/16/2024: Severe protein-calorie malnutrition

## 2024-08-13 NOTE — PROGRESS NOTE ADULT - SUBJECTIVE AND OBJECTIVE BOX
*********INCOMPLETE NOTE*******    OVERNIGHT EVENTS: Deonna    SUBJECTIVE: Pt resting comfortably in bed. Reports flatus this morning, no BM yet. He reports his abdominal pain is improving. Denies any nausea, vomiting, chest pain, SOB. No other symptoms currently. On methylpred 20mg TID.     ROS: otherwise negative      T(C): 36.5 (08-13-24 @ 05:46), Max: 37 (08-12-24 @ 16:22)  HR: 85 (08-13-24 @ 05:46) (79 - 85)  BP: 116/70 (08-13-24 @ 05:46) (112/74 - 119/75)  RR: 18 (08-13-24 @ 05:46) (17 - 18)  SpO2: 95% (08-13-24 @ 05:46) (94% - 95%)  Wt(kg): --Vital Signs Last 24 Hrs  T(C): 36.5 (13 Aug 2024 05:46), Max: 37 (12 Aug 2024 16:22)  T(F): 97.7 (13 Aug 2024 05:46), Max: 98.6 (12 Aug 2024 16:22)  HR: 85 (13 Aug 2024 05:46) (79 - 85)  BP: 116/70 (13 Aug 2024 05:46) (112/74 - 119/75)  BP(mean): 83 (12 Aug 2024 20:53) (83 - 83)  RR: 18 (13 Aug 2024 05:46) (17 - 18)  SpO2: 95% (13 Aug 2024 05:46) (94% - 95%)    Parameters below as of 13 Aug 2024 05:46  Patient On (Oxygen Delivery Method): room air        PHYSICAL EXAM:  Constitutional: resting comfortably in bed; NAD  Respiratory: CTA B/L; no W/R/R  Cardiac: RRR; no M/R/G  Gastrointestinal: soft, slightly distended. Mild postoperative tenderness, no discrete areas of tenderness. Surgical incisions clean, dry and intact  Extremities: WWP, no clubbing or cyanosis  Musculoskeletal: NROM x4  Neurologic: AAOx3; no focal deficits  Psychiatric: affect and characteristics of appearance, verbalizations, behaviors are appropriate    LABS:                        12.8   4.54  )-----------( 274      ( 13 Aug 2024 07:37 )             39.9     08-13    139  |  104  |  13  ----------------------------<  111<H>  4.2   |  26  |  0.70    Ca    9.0      13 Aug 2024 07:37  Phos  3.2     08-13  Mg     2.0     08-13          Urinalysis Basic - ( 13 Aug 2024 07:37 )    Color: x / Appearance: x / SG: x / pH: x  Gluc: 111 mg/dL / Ketone: x  / Bili: x / Urobili: x   Blood: x / Protein: x / Nitrite: x   Leuk Esterase: x / RBC: x / WBC x   Sq Epi: x / Non Sq Epi: x / Bacteria: x      CAPILLARY BLOOD GLUCOSE            Urinalysis Basic - ( 13 Aug 2024 07:37 )    Color: x / Appearance: x / SG: x / pH: x  Gluc: 111 mg/dL / Ketone: x  / Bili: x / Urobili: x   Blood: x / Protein: x / Nitrite: x   Leuk Esterase: x / RBC: x / WBC x   Sq Epi: x / Non Sq Epi: x / Bacteria: x        MEDICATIONS  (STANDING):  heparin   Injectable 5000 Unit(s) SubCutaneous every 8 hours  HYDROmorphone  Injectable 0.2 milliGRAM(s) IV Push every 15 minutes  lactated ringers. 1000 milliLiter(s) (120 mL/Hr) IV Continuous <Continuous>  methylPREDNISolone sodium succinate Injectable 20 milliGRAM(s) IV Push every 8 hours    MEDICATIONS  (PRN):  ondansetron Injectable 4 milliGRAM(s) IV Push every 6 hours PRN Nausea      RADIOLOGY & ADDITIONAL TESTS: Reviewed   OVERNIGHT EVENTS: No acute events overnigt    SUBJECTIVE: Pt resting comfortably in bed. Reports flatus this morning, no BM yet. He reports his abdominal pain is improving. Denies any nausea, vomiting, chest pain, SOB. No other symptoms currently. On methylpred 20mg TID. Still has ng tube in place with bilious secretions in suction canisters.    ROS: otherwise negative      T(C): 36.5 (08-13-24 @ 05:46), Max: 37 (08-12-24 @ 16:22)  HR: 85 (08-13-24 @ 05:46) (79 - 85)  BP: 116/70 (08-13-24 @ 05:46) (112/74 - 119/75)  RR: 18 (08-13-24 @ 05:46) (17 - 18)  SpO2: 95% (08-13-24 @ 05:46) (94% - 95%)  Wt(kg): --Vital Signs Last 24 Hrs  T(C): 36.5 (13 Aug 2024 05:46), Max: 37 (12 Aug 2024 16:22)  T(F): 97.7 (13 Aug 2024 05:46), Max: 98.6 (12 Aug 2024 16:22)  HR: 85 (13 Aug 2024 05:46) (79 - 85)  BP: 116/70 (13 Aug 2024 05:46) (112/74 - 119/75)  BP(mean): 83 (12 Aug 2024 20:53) (83 - 83)  RR: 18 (13 Aug 2024 05:46) (17 - 18)  SpO2: 95% (13 Aug 2024 05:46) (94% - 95%)    Parameters below as of 13 Aug 2024 05:46  Patient On (Oxygen Delivery Method): room air        PHYSICAL EXAM:  Constitutional: resting comfortably in bed; NAD  Respiratory: CTA B/L; no W/R/R  Cardiac: RRR; no M/R/G  Gastrointestinal: soft, slightly distended. Mild postoperative tenderness, no discrete areas of tenderness. Surgical incisions clean, dry and intact  Extremities: WWP, no clubbing or cyanosis  Musculoskeletal: NROM x4  Neurologic: AAOx3; no focal deficits  Psychiatric: affect and characteristics of appearance, verbalizations, behaviors are appropriate    LABS:                        12.8   4.54  )-----------( 274      ( 13 Aug 2024 07:37 )             39.9     08-13    139  |  104  |  13  ----------------------------<  111<H>  4.2   |  26  |  0.70    Ca    9.0      13 Aug 2024 07:37  Phos  3.2     08-13  Mg     2.0     08-13          Urinalysis Basic - ( 13 Aug 2024 07:37 )    Color: x / Appearance: x / SG: x / pH: x  Gluc: 111 mg/dL / Ketone: x  / Bili: x / Urobili: x   Blood: x / Protein: x / Nitrite: x   Leuk Esterase: x / RBC: x / WBC x   Sq Epi: x / Non Sq Epi: x / Bacteria: x      CAPILLARY BLOOD GLUCOSE            Urinalysis Basic - ( 13 Aug 2024 07:37 )    Color: x / Appearance: x / SG: x / pH: x  Gluc: 111 mg/dL / Ketone: x  / Bili: x / Urobili: x   Blood: x / Protein: x / Nitrite: x   Leuk Esterase: x / RBC: x / WBC x   Sq Epi: x / Non Sq Epi: x / Bacteria: x        MEDICATIONS  (STANDING):  heparin   Injectable 5000 Unit(s) SubCutaneous every 8 hours  HYDROmorphone  Injectable 0.2 milliGRAM(s) IV Push every 15 minutes  lactated ringers. 1000 milliLiter(s) (120 mL/Hr) IV Continuous <Continuous>  methylPREDNISolone sodium succinate Injectable 20 milliGRAM(s) IV Push every 8 hours    MEDICATIONS  (PRN):  ondansetron Injectable 4 milliGRAM(s) IV Push every 6 hours PRN Nausea      RADIOLOGY & ADDITIONAL TESTS: Reviewed

## 2024-08-14 ENCOUNTER — TRANSCRIPTION ENCOUNTER (OUTPATIENT)
Age: 49
End: 2024-08-14

## 2024-08-14 LAB
ALBUMIN SERPL ELPH-MCNC: 4.1 G/DL — SIGNIFICANT CHANGE UP (ref 3.3–5)
ALP SERPL-CCNC: 42 U/L — SIGNIFICANT CHANGE UP (ref 40–120)
ALT FLD-CCNC: 23 U/L — SIGNIFICANT CHANGE UP (ref 10–45)
ANION GAP SERPL CALC-SCNC: 13 MMOL/L — SIGNIFICANT CHANGE UP (ref 5–17)
AST SERPL-CCNC: 35 U/L — SIGNIFICANT CHANGE UP (ref 10–40)
BILIRUB SERPL-MCNC: 0.9 MG/DL — SIGNIFICANT CHANGE UP (ref 0.2–1.2)
BUN SERPL-MCNC: 14 MG/DL — SIGNIFICANT CHANGE UP (ref 7–23)
CALCIUM SERPL-MCNC: 9.8 MG/DL — SIGNIFICANT CHANGE UP (ref 8.4–10.5)
CHLORIDE SERPL-SCNC: 102 MMOL/L — SIGNIFICANT CHANGE UP (ref 96–108)
CO2 SERPL-SCNC: 25 MMOL/L — SIGNIFICANT CHANGE UP (ref 22–31)
CREAT SERPL-MCNC: 0.68 MG/DL — SIGNIFICANT CHANGE UP (ref 0.5–1.3)
CRP SERPL-MCNC: 14.4 MG/L — HIGH (ref 0–4)
EGFR: 115 ML/MIN/1.73M2 — SIGNIFICANT CHANGE UP
GLUCOSE SERPL-MCNC: 111 MG/DL — HIGH (ref 70–99)
HCT VFR BLD CALC: 40.3 % — SIGNIFICANT CHANGE UP (ref 39–50)
HGB BLD-MCNC: 13.4 G/DL — SIGNIFICANT CHANGE UP (ref 13–17)
MAGNESIUM SERPL-MCNC: 2.1 MG/DL — SIGNIFICANT CHANGE UP (ref 1.6–2.6)
MCHC RBC-ENTMCNC: 27 PG — SIGNIFICANT CHANGE UP (ref 27–34)
MCHC RBC-ENTMCNC: 33.3 GM/DL — SIGNIFICANT CHANGE UP (ref 32–36)
MCV RBC AUTO: 81.3 FL — SIGNIFICANT CHANGE UP (ref 80–100)
NRBC # BLD: 0 /100 WBCS — SIGNIFICANT CHANGE UP (ref 0–0)
PHOSPHATE SERPL-MCNC: 3.5 MG/DL — SIGNIFICANT CHANGE UP (ref 2.5–4.5)
PLATELET # BLD AUTO: 301 K/UL — SIGNIFICANT CHANGE UP (ref 150–400)
POTASSIUM SERPL-MCNC: 3.9 MMOL/L — SIGNIFICANT CHANGE UP (ref 3.5–5.3)
POTASSIUM SERPL-SCNC: 3.9 MMOL/L — SIGNIFICANT CHANGE UP (ref 3.5–5.3)
PROT SERPL-MCNC: 7.8 G/DL — SIGNIFICANT CHANGE UP (ref 6–8.3)
RBC # BLD: 4.96 M/UL — SIGNIFICANT CHANGE UP (ref 4.2–5.8)
RBC # FLD: 13.1 % — SIGNIFICANT CHANGE UP (ref 10.3–14.5)
SODIUM SERPL-SCNC: 140 MMOL/L — SIGNIFICANT CHANGE UP (ref 135–145)
WBC # BLD: 7.09 K/UL — SIGNIFICANT CHANGE UP (ref 3.8–10.5)
WBC # FLD AUTO: 7.09 K/UL — SIGNIFICANT CHANGE UP (ref 3.8–10.5)

## 2024-08-14 PROCEDURE — 99232 SBSQ HOSP IP/OBS MODERATE 35: CPT | Mod: GC

## 2024-08-14 RX ORDER — MENTHOL/CETYLPYRD CL 3 MG
1 LOZENGE MUCOUS MEMBRANE ONCE
Refills: 0 | Status: COMPLETED | OUTPATIENT
Start: 2024-08-14 | End: 2024-08-14

## 2024-08-14 RX ADMIN — Medication 1 SPRAY(S): at 21:06

## 2024-08-14 RX ADMIN — Medication 5000 UNIT(S): at 01:46

## 2024-08-14 RX ADMIN — Medication 5000 UNIT(S): at 17:37

## 2024-08-14 RX ADMIN — Medication 20 MILLIGRAM(S): at 01:46

## 2024-08-14 RX ADMIN — Medication 1 PATCH: at 07:18

## 2024-08-14 RX ADMIN — Medication 20 MILLIGRAM(S): at 08:08

## 2024-08-14 RX ADMIN — Medication 20 MILLIGRAM(S): at 17:37

## 2024-08-14 RX ADMIN — Medication 5000 UNIT(S): at 08:08

## 2024-08-14 RX ADMIN — Medication 1 PATCH: at 18:53

## 2024-08-14 NOTE — PROGRESS NOTE ADULT - SUBJECTIVE AND OBJECTIVE BOX
please see report: (ammendment to report): cecum not reached ( absent due to prior resection) the transverse colon was full of stool limiting exam and extent, rest of colon distally showed normal mucosa but incomplete study due to poor prep  to discuss with surgical team options

## 2024-08-14 NOTE — PROGRESS NOTE ADULT - SUBJECTIVE AND OBJECTIVE BOX
OVERNIGHT EVENTS: Deonna    SUBJECTIVE: Patient resting comfortably. He reports passing flatus and liquid stools. Reports abdominal pain has been improving since the ex lap (8/11). Currently on methylpred 20mg TID (since 8/12). Denies any fevers, chills, chest pain, SOB. NG in place, 1100 mL output on 8/13.    ROS: otherwise negative      T(C): 36.4 (08-14-24 @ 05:45), Max: 36.8 (08-13-24 @ 13:05)  HR: 70 (08-14-24 @ 05:45) (70 - 84)  BP: 120/71 (08-14-24 @ 05:45) (110/69 - 143/78)  RR: 17 (08-14-24 @ 05:45) (17 - 18)  SpO2: 93% (08-14-24 @ 05:45) (93% - 97%)  Wt(kg): --Vital Signs Last 24 Hrs  T(C): 36.4 (14 Aug 2024 05:45), Max: 36.8 (13 Aug 2024 13:05)  T(F): 97.5 (14 Aug 2024 05:45), Max: 98.2 (13 Aug 2024 13:05)  HR: 70 (14 Aug 2024 05:45) (70 - 84)  BP: 120/71 (14 Aug 2024 05:45) (110/69 - 143/78)  BP(mean): --  RR: 17 (14 Aug 2024 05:45) (17 - 18)  SpO2: 93% (14 Aug 2024 05:45) (93% - 97%)    Parameters below as of 14 Aug 2024 05:45  Patient On (Oxygen Delivery Method): room air        PHYSICAL EXAM:  Constitutional: resting comfortably in bed; NAD  Respiratory: CTA B/L; no W/R/R  Cardiac: RRR; no M/R/G  Gastrointestinal: soft, distention improved from yesterday. Minimal post op tenderness. Surgical sites clean, dry and intact.  Extremities: WWP, no clubbing or cyanosis  Musculoskeletal: NROM x4  Neurologic: AAOx3; no focal deficits  Psychiatric: affect and characteristics of appearance, verbalizations, behaviors are appropriate    LABS:                        12.8   4.54  )-----------( 274      ( 13 Aug 2024 07:37 )             39.9     08-14    140  |  102  |  14  ----------------------------<  111<H>  3.9   |  25  |  0.68    Ca    9.8      14 Aug 2024 07:43  Phos  3.5     08-14  Mg     2.1     08-14    TPro  7.8  /  Alb  4.1  /  TBili  0.9  /  DBili  x   /  AST  35  /  ALT  23  /  AlkPhos  42  08-14        Urinalysis Basic - ( 14 Aug 2024 07:43 )    Color: x / Appearance: x / SG: x / pH: x  Gluc: 111 mg/dL / Ketone: x  / Bili: x / Urobili: x   Blood: x / Protein: x / Nitrite: x   Leuk Esterase: x / RBC: x / WBC x   Sq Epi: x / Non Sq Epi: x / Bacteria: x      CAPILLARY BLOOD GLUCOSE            Urinalysis Basic - ( 14 Aug 2024 07:43 )    Color: x / Appearance: x / SG: x / pH: x  Gluc: 111 mg/dL / Ketone: x  / Bili: x / Urobili: x   Blood: x / Protein: x / Nitrite: x   Leuk Esterase: x / RBC: x / WBC x   Sq Epi: x / Non Sq Epi: x / Bacteria: x        MEDICATIONS  (STANDING):  heparin   Injectable 5000 Unit(s) SubCutaneous every 8 hours  HYDROmorphone  Injectable 0.2 milliGRAM(s) IV Push every 15 minutes  lactated ringers. 1000 milliLiter(s) (120 mL/Hr) IV Continuous <Continuous>  methylPREDNISolone sodium succinate Injectable 20 milliGRAM(s) IV Push every 8 hours    MEDICATIONS  (PRN):  ondansetron Injectable 4 milliGRAM(s) IV Push every 6 hours PRN Nausea      RADIOLOGY & ADDITIONAL TESTS: Reviewed   OVERNIGHT EVENTS: Deonna    SUBJECTIVE: Patient resting comfortably. He reports passing flatus and liquid stools. Reports abdominal pain has been improving since the ex lap (8/11). Currently on methylpred 20mg TID (since 8/12). Denies any fevers, chills, chest pain, SOB. NG in place, 1100 mL output on 8/13. 600 mL in canister this morning.    ROS: otherwise negative      T(C): 36.4 (08-14-24 @ 05:45), Max: 36.8 (08-13-24 @ 13:05)  HR: 70 (08-14-24 @ 05:45) (70 - 84)  BP: 120/71 (08-14-24 @ 05:45) (110/69 - 143/78)  RR: 17 (08-14-24 @ 05:45) (17 - 18)  SpO2: 93% (08-14-24 @ 05:45) (93% - 97%)  Wt(kg): --Vital Signs Last 24 Hrs  T(C): 36.4 (14 Aug 2024 05:45), Max: 36.8 (13 Aug 2024 13:05)  T(F): 97.5 (14 Aug 2024 05:45), Max: 98.2 (13 Aug 2024 13:05)  HR: 70 (14 Aug 2024 05:45) (70 - 84)  BP: 120/71 (14 Aug 2024 05:45) (110/69 - 143/78)  BP(mean): --  RR: 17 (14 Aug 2024 05:45) (17 - 18)  SpO2: 93% (14 Aug 2024 05:45) (93% - 97%)    Parameters below as of 14 Aug 2024 05:45  Patient On (Oxygen Delivery Method): room air        PHYSICAL EXAM:  Constitutional: resting comfortably in bed; NAD  Respiratory: CTA B/L; no W/R/R  Cardiac: RRR; no M/R/G  Gastrointestinal: soft, distention improved from yesterday. Minimal post op tenderness. Surgical sites clean, dry and intact.  Extremities: WWP, no clubbing or cyanosis  Musculoskeletal: NROM x4  Neurologic: AAOx3; no focal deficits  Psychiatric: affect and characteristics of appearance, verbalizations, behaviors are appropriate    LABS:                        12.8   4.54  )-----------( 274      ( 13 Aug 2024 07:37 )             39.9     08-14    140  |  102  |  14  ----------------------------<  111<H>  3.9   |  25  |  0.68    Ca    9.8      14 Aug 2024 07:43  Phos  3.5     08-14  Mg     2.1     08-14    TPro  7.8  /  Alb  4.1  /  TBili  0.9  /  DBili  x   /  AST  35  /  ALT  23  /  AlkPhos  42  08-14        Urinalysis Basic - ( 14 Aug 2024 07:43 )    Color: x / Appearance: x / SG: x / pH: x  Gluc: 111 mg/dL / Ketone: x  / Bili: x / Urobili: x   Blood: x / Protein: x / Nitrite: x   Leuk Esterase: x / RBC: x / WBC x   Sq Epi: x / Non Sq Epi: x / Bacteria: x      CAPILLARY BLOOD GLUCOSE            Urinalysis Basic - ( 14 Aug 2024 07:43 )    Color: x / Appearance: x / SG: x / pH: x  Gluc: 111 mg/dL / Ketone: x  / Bili: x / Urobili: x   Blood: x / Protein: x / Nitrite: x   Leuk Esterase: x / RBC: x / WBC x   Sq Epi: x / Non Sq Epi: x / Bacteria: x        MEDICATIONS  (STANDING):  heparin   Injectable 5000 Unit(s) SubCutaneous every 8 hours  HYDROmorphone  Injectable 0.2 milliGRAM(s) IV Push every 15 minutes  lactated ringers. 1000 milliLiter(s) (120 mL/Hr) IV Continuous <Continuous>  methylPREDNISolone sodium succinate Injectable 20 milliGRAM(s) IV Push every 8 hours    MEDICATIONS  (PRN):  ondansetron Injectable 4 milliGRAM(s) IV Push every 6 hours PRN Nausea      RADIOLOGY & ADDITIONAL TESTS: Reviewed

## 2024-08-14 NOTE — PROGRESS NOTE ADULT - ATTENDING COMMENTS
CRS Attending  Seen and examined on morning rounds  Patient reports he continues to feel improved.  Denies pain. Denies feeling distended.  No nausea or vomiting  NGT remains in place  Passing flatus and having liquid BM  Gen NAD  Abdomen soft, nontender, mildly distended  Continue steroids  F/u GI recs/?timing of colonoscopy  Continue NGT   Discussed surgical housestaff.

## 2024-08-14 NOTE — PROGRESS NOTE ADULT - SUBJECTIVE AND OBJECTIVE BOX
INTERVAL HPI/OVERNIGHT EVENTS: YOKO, VSS    STATUS POST: 8/11: dx lap, lysis of adhesions    SUBJECTIVE: Patient seen and examined bedside with chief resident on AM rounds. He reports feeling okay today. +F/+liquid BMs. +OOBA. He denies feeling bloated. Denies cp, sob, nausea, emesis, or fevers.     MEDICATIONS  (STANDING):  heparin   Injectable 5000 Unit(s) SubCutaneous every 8 hours  HYDROmorphone  Injectable 0.2 milliGRAM(s) IV Push every 15 minutes  lactated ringers. 1000 milliLiter(s) (120 mL/Hr) IV Continuous <Continuous>  methylPREDNISolone sodium succinate Injectable 20 milliGRAM(s) IV Push every 8 hours    MEDICATIONS  (PRN):  ondansetron Injectable 4 milliGRAM(s) IV Push every 6 hours PRN Nausea      Vital Signs Last 24 Hrs  T(C): 36.4 (14 Aug 2024 05:45), Max: 36.8 (13 Aug 2024 13:05)  T(F): 97.5 (14 Aug 2024 05:45), Max: 98.2 (13 Aug 2024 13:05)  HR: 70 (14 Aug 2024 05:45) (70 - 84)  BP: 120/71 (14 Aug 2024 05:45) (110/69 - 143/78)  BP(mean): --  RR: 17 (14 Aug 2024 05:45) (17 - 18)  SpO2: 93% (14 Aug 2024 05:45) (93% - 97%)    Parameters below as of 14 Aug 2024 05:45  Patient On (Oxygen Delivery Method): room air        PHYSICAL EXAM:  Constitutional: A&Ox3, resting comfortably in bed  ENT: NGT in place with bilious output  Respiratory: non labored breathing, no respiratory distress  Cardiovascular: NSR, RRR  Gastrointestinal: Abdomen soft, mildly distended and nontender to palpation                 Incision: c/d/i with dermabond  Genitourinary: voiding  Extremities: wwp, no calf tenderness or edema, +SCDs      I&O's Detail    13 Aug 2024 07:01  -  14 Aug 2024 07:00  --------------------------------------------------------  IN:    Lactated Ringers: 2880 mL  Total IN: 2880 mL    OUT:    Nasogastric/Oral tube (mL): 1050 mL    Voided (mL): 1300 mL  Total OUT: 2350 mL    Total NET: 530 mL      LABS:                        12.8   4.54  )-----------( 274      ( 13 Aug 2024 07:37 )             39.9     08-13    139  |  104  |  13  ----------------------------<  111<H>  4.2   |  26  |  0.70    Ca    9.0      13 Aug 2024 07:37  Phos  3.2     08-13  Mg     2.0     08-13        Urinalysis Basic - ( 13 Aug 2024 07:37 )    Color: x / Appearance: x / SG: x / pH: x  Gluc: 111 mg/dL / Ketone: x  / Bili: x / Urobili: x   Blood: x / Protein: x / Nitrite: x   Leuk Esterase: x / RBC: x / WBC x   Sq Epi: x / Non Sq Epi: x / Bacteria: x        RADIOLOGY & ADDITIONAL STUDIES:

## 2024-08-14 NOTE — PRE-ANESTHESIA EVALUATION ADULT - NSANTHPMHFT_GEN_ALL_CORE
This is a 49 year old male with stricturing ileocolonic Crohn's disease (diagnosed about 2 years prior to this presentation, previous exposure to ADA, ileocolic resection with anastomosis about 12 years age) who gastroenterology has been consulted for bowel obstruction. Original concern at time of presentation was for closed loop obstruction, underwent ex-lap and was not found to have signs of closed loop obstruction. Underwent lysis of adhesions and placed on hospital floor. Passing gas and having liquid stools currently, abdominal pain improving. NG with 1100 mL output on 8/13. 600 mL in canister this morning. Currently on methylpred 20mg TID (since 8/12). CRP downtrending, 14.4 on 8/14.    #Acute small bowel obstruction secondary to ileocolonic anastomotic stricture  #Stricturing ileocolonic Crohn's disease  - Tap water enema  - Plan for C- scope today at 1400
49M with PMHx of Crohn's disease (diagnosed in 2021, not currently on medications) and PSHx of SBR of terminal ileum (10 years ago) who presents to ED with 1d crampy, worsening abdominal pain, obstipation, constipation. VS wnl. Labs wnl. Abdomen soft, moderately distended, moderately TTP worse in R hemiabdomen, no rebound or guarding, well healed midline periumbilical laparotomy and port site scars. CTAP with TP at ileocolic anastomosis and TP proximal to anastomosis with bowel dilation and wall thickening between transition points. Concerning for closed loop SBO 2/2 adhesive disease vs. recurrent Crohn stricture. Less likely Crohn's flare. Given the imaging findings and clinical picture, plan to admit, NPO, proceed to OR class 2 for dx lap, possible ex lap

## 2024-08-14 NOTE — PROGRESS NOTE ADULT - ASSESSMENT
49M with PMHx of Crohn's disease (diagnosed in 2021, not currently on medications) and PSHx of SBR of terminal ileum (10 years ago) who presents to [facility] for abdominal pain. CTAP concerning for closed loop obstruction with two distinct transition points. He is now s/p a dx lap and lysis of adhesions (8/11).      NPO/IVF  NGT to LIWS  Pain/nausea control PRN  HSQ/SCDs/IS/OOBA  GI Consult - 20 mg Methylprednisolone TID  AM labs

## 2024-08-14 NOTE — PROGRESS NOTE ADULT - ASSESSMENT
This is a 49 year old male with stricturing ileocolonic Crohn's disease (diagnosed about 2 years prior to this presentation, previous exposure to ADA, ileocolic resection with anastomosis about 12 years age) who gastroenterology has been consulted for bowel obstruction. Original concern at time of presentation was for closed loop obstruction, underwent ex-lap and was not found to have signs of closed loop obstruction. Underwent lysis of adhesions and placed on hospital floor. Passing gas and having liquid stools currently, abdominal pain improving. Currently on methylpred 20mg TID (since 8/12). CRP downtrending, 14.4 on 8/14.    #Acute small bowel obstruction secondary to ileocolonic anastomotic stricture  #Stricturing ileocolonic Crohn's disease This is a 49 year old male with stricturing ileocolonic Crohn's disease (diagnosed about 2 years prior to this presentation, previous exposure to ADA, ileocolic resection with anastomosis about 12 years age) who gastroenterology has been consulted for bowel obstruction. Original concern at time of presentation was for closed loop obstruction, underwent ex-lap and was not found to have signs of closed loop obstruction. Underwent lysis of adhesions and placed on hospital floor. Passing gas and having liquid stools currently, abdominal pain improving. NG with 1100 mL output on 8/13. 600 mL in canister this morning. Currently on methylpred 20mg TID (since 8/12). CRP downtrending, 14.4 on 8/14.    #Acute small bowel obstruction secondary to ileocolonic anastomotic stricture  #Stricturing ileocolonic Crohn's disease  - NG output remains copious. Pt yet to pass formed stool, at this point patient unlikely to tolerate prep for colonoscopy. Continue to monitor NG output. Defer c-scope at this point  - Continue with IV methylprednisolone 20mg TID. CRP downtrending, continue to trend. Monitor for formed stool  -Postoperative care per surgery team This is a 49 year old male with stricturing ileocolonic Crohn's disease (diagnosed about 2 years prior to this presentation, previous exposure to ADA, ileocolic resection with anastomosis about 12 years age) who gastroenterology has been consulted for bowel obstruction. Original concern at time of presentation was for closed loop obstruction, underwent ex-lap and was not found to have signs of closed loop obstruction. Underwent lysis of adhesions and placed on hospital floor. Passing gas and having liquid stools currently, abdominal pain improving. NG with 1100 mL output on 8/13. 600 mL in canister this morning. Currently on methylpred 20mg TID (since 8/12). CRP downtrending, 14.4 on 8/14.    #Acute small bowel obstruction secondary to ileocolonic anastomotic stricture  #Stricturing ileocolonic Crohn's disease  - Tap water enema  - Plan for C- scope today at 1400  - Continue with IV methylprednisolone 20mg TID. CRP downtrending, continue to trend.   -Postoperative care per surgery team

## 2024-08-15 PROBLEM — K50.90 CROHN'S DISEASE, UNSPECIFIED, WITHOUT COMPLICATIONS: Chronic | Status: ACTIVE | Noted: 2024-08-11

## 2024-08-15 PROBLEM — Z00.00 ENCOUNTER FOR PREVENTIVE HEALTH EXAMINATION: Status: ACTIVE | Noted: 2024-08-15

## 2024-08-15 LAB
ALBUMIN SERPL ELPH-MCNC: 3.9 G/DL — SIGNIFICANT CHANGE UP (ref 3.3–5)
ALP SERPL-CCNC: 41 U/L — SIGNIFICANT CHANGE UP (ref 40–120)
ALT FLD-CCNC: 33 U/L — SIGNIFICANT CHANGE UP (ref 10–45)
ANION GAP SERPL CALC-SCNC: 10 MMOL/L — SIGNIFICANT CHANGE UP (ref 5–17)
AST SERPL-CCNC: 35 U/L — SIGNIFICANT CHANGE UP (ref 10–40)
BILIRUB SERPL-MCNC: 0.9 MG/DL — SIGNIFICANT CHANGE UP (ref 0.2–1.2)
BUN SERPL-MCNC: 16 MG/DL — SIGNIFICANT CHANGE UP (ref 7–23)
CALCIUM SERPL-MCNC: 9.4 MG/DL — SIGNIFICANT CHANGE UP (ref 8.4–10.5)
CHLORIDE SERPL-SCNC: 101 MMOL/L — SIGNIFICANT CHANGE UP (ref 96–108)
CO2 SERPL-SCNC: 29 MMOL/L — SIGNIFICANT CHANGE UP (ref 22–31)
CREAT SERPL-MCNC: 0.73 MG/DL — SIGNIFICANT CHANGE UP (ref 0.5–1.3)
CRP SERPL-MCNC: 8.6 MG/L — HIGH (ref 0–4)
EGFR: 113 ML/MIN/1.73M2 — SIGNIFICANT CHANGE UP
GAMMA INTERFERON BACKGROUND BLD IA-ACNC: 0.02 IU/ML — SIGNIFICANT CHANGE UP
GLUCOSE SERPL-MCNC: 98 MG/DL — SIGNIFICANT CHANGE UP (ref 70–99)
HCT VFR BLD CALC: 43.7 % — SIGNIFICANT CHANGE UP (ref 39–50)
HGB BLD-MCNC: 14.1 G/DL — SIGNIFICANT CHANGE UP (ref 13–17)
M TB IFN-G BLD-IMP: ABNORMAL
M TB IFN-G CD4+ BCKGRND COR BLD-ACNC: 0 IU/ML — SIGNIFICANT CHANGE UP
M TB IFN-G CD4+CD8+ BCKGRND COR BLD-ACNC: 0 IU/ML — SIGNIFICANT CHANGE UP
MAGNESIUM SERPL-MCNC: 2.2 MG/DL — SIGNIFICANT CHANGE UP (ref 1.6–2.6)
MCHC RBC-ENTMCNC: 27.8 PG — SIGNIFICANT CHANGE UP (ref 27–34)
MCHC RBC-ENTMCNC: 32.3 GM/DL — SIGNIFICANT CHANGE UP (ref 32–36)
MCV RBC AUTO: 86.2 FL — SIGNIFICANT CHANGE UP (ref 80–100)
NRBC # BLD: 0 /100 WBCS — SIGNIFICANT CHANGE UP (ref 0–0)
PHOSPHATE SERPL-MCNC: 3.7 MG/DL — SIGNIFICANT CHANGE UP (ref 2.5–4.5)
PLATELET # BLD AUTO: 318 K/UL — SIGNIFICANT CHANGE UP (ref 150–400)
POTASSIUM SERPL-MCNC: 4.1 MMOL/L — SIGNIFICANT CHANGE UP (ref 3.5–5.3)
POTASSIUM SERPL-SCNC: 4.1 MMOL/L — SIGNIFICANT CHANGE UP (ref 3.5–5.3)
PROT SERPL-MCNC: 7.3 G/DL — SIGNIFICANT CHANGE UP (ref 6–8.3)
QUANT TB PLUS MITOGEN MINUS NIL: 0.08 IU/ML — SIGNIFICANT CHANGE UP
RBC # BLD: 5.07 M/UL — SIGNIFICANT CHANGE UP (ref 4.2–5.8)
RBC # FLD: 13.3 % — SIGNIFICANT CHANGE UP (ref 10.3–14.5)
SODIUM SERPL-SCNC: 140 MMOL/L — SIGNIFICANT CHANGE UP (ref 135–145)
WBC # BLD: 8.18 K/UL — SIGNIFICANT CHANGE UP (ref 3.8–10.5)
WBC # FLD AUTO: 8.18 K/UL — SIGNIFICANT CHANGE UP (ref 3.8–10.5)

## 2024-08-15 PROCEDURE — 99232 SBSQ HOSP IP/OBS MODERATE 35: CPT | Mod: GC

## 2024-08-15 PROCEDURE — 74019 RADEX ABDOMEN 2 VIEWS: CPT | Mod: 26

## 2024-08-15 RX ADMIN — Medication 1 PATCH: at 09:37

## 2024-08-15 RX ADMIN — Medication 120 MILLILITER(S): at 07:18

## 2024-08-15 RX ADMIN — Medication 20 MILLIGRAM(S): at 09:43

## 2024-08-15 RX ADMIN — Medication 120 MILLILITER(S): at 15:16

## 2024-08-15 RX ADMIN — Medication 20 MILLIGRAM(S): at 01:22

## 2024-08-15 RX ADMIN — Medication 5000 UNIT(S): at 09:43

## 2024-08-15 RX ADMIN — Medication 5000 UNIT(S): at 01:26

## 2024-08-15 RX ADMIN — Medication 5000 UNIT(S): at 16:11

## 2024-08-15 RX ADMIN — Medication 20 MILLIGRAM(S): at 16:11

## 2024-08-15 NOTE — PROGRESS NOTE ADULT - ASSESSMENT
49M with PMHx of Crohn's disease (diagnosed in 2021, not currently on medications) and PSHx of SBR of terminal ileum (10 years ago) who presents to [facility] for abdominal pain. Patient afebrile, HD normal on presentation without acute laboratory abnormalaties noted. CTAP concerning for closed loop obstruction with two distinct transition points. Now s/p dx lap, lysis of adhesions (8/11)    NPO/IVF  NGT to LIWS  Pain/nausea control PRN  HSQ/SCDs/IS/OOBA  GI Consult - 20 mg Methylprednisolone TID  AM labs  AM Abdominal X-Rays

## 2024-08-15 NOTE — PROGRESS NOTE ADULT - SUBJECTIVE AND OBJECTIVE BOX
SUBJECTIVE: YOKO. Pt doing well, denies any abdominal pain.       MEDICATIONS  (STANDING):  dextrose 5% + sodium chloride 0.45%. 1000 milliLiter(s) (120 mL/Hr) IV Continuous <Continuous>  heparin   Injectable 5000 Unit(s) SubCutaneous every 8 hours  HYDROmorphone  Injectable 0.2 milliGRAM(s) IV Push every 15 minutes  methylPREDNISolone sodium succinate Injectable 20 milliGRAM(s) IV Push every 8 hours    MEDICATIONS  (PRN):  ondansetron Injectable 4 milliGRAM(s) IV Push every 6 hours PRN Nausea      Vital Signs Last 24 Hrs  T(C): 37.1 (15 Aug 2024 05:18), Max: 37.1 (15 Aug 2024 05:18)  T(F): 98.7 (15 Aug 2024 05:18), Max: 98.7 (15 Aug 2024 05:18)  HR: 73 (15 Aug 2024 05:18) (67 - 78)  BP: 120/76 (15 Aug 2024 05:18) (120/76 - 125/76)  BP(mean): 83 (14 Aug 2024 14:55) (83 - 83)  RR: 16 (15 Aug 2024 05:18) (16 - 17)  SpO2: 95% (14 Aug 2024 17:03) (95% - 99%)    Parameters below as of 14 Aug 2024 17:03  Patient On (Oxygen Delivery Method): room air        PHYSICAL EXAM:      Constitutional: A&Ox3    ENMT: NGT to LIWS    Respiratory: non labored breathing, no respiratory distress    Cardiovascular: NSR, RRR    Gastrointestinal: soft, NT, ND                 Incision: C/D/I    Extremities: (-) edema                  I&O's Detail    14 Aug 2024 07:01  -  15 Aug 2024 07:00  --------------------------------------------------------  IN:    Lactated Ringers: 2520 mL  Total IN: 2520 mL    OUT:    Nasogastric/Oral tube (mL): 575 mL    Oral Fluid: 0 mL    Voided (mL): 1100 mL  Total OUT: 1675 mL    Total NET: 845 mL          LABS:                        14.1   8.18  )-----------( 318      ( 15 Aug 2024 05:30 )             43.7     08-14    140  |  102  |  14  ----------------------------<  111<H>  3.9   |  25  |  0.68    Ca    9.8      14 Aug 2024 07:43  Phos  3.5     08-14  Mg     2.1     08-14    TPro  7.8  /  Alb  4.1  /  TBili  0.9  /  DBili  x   /  AST  35  /  ALT  23  /  AlkPhos  42  08-14      Urinalysis Basic - ( 14 Aug 2024 07:43 )    Color: x / Appearance: x / SG: x / pH: x  Gluc: 111 mg/dL / Ketone: x  / Bili: x / Urobili: x   Blood: x / Protein: x / Nitrite: x   Leuk Esterase: x / RBC: x / WBC x   Sq Epi: x / Non Sq Epi: x / Bacteria: x        RADIOLOGY & ADDITIONAL STUDIES:

## 2024-08-15 NOTE — PROGRESS NOTE ADULT - ATTENDING COMMENTS
CRS Attending  Seen and examined on am rounds  Colonoscopy yesterday, findings discussed with GI  Patient is without complaints. Denies abdominal pain. Last flatus and BM prior to colonoscopy.   Abdomen soft, nontender, mild distension  Maintain NGT  AM Xrays  Parental nutrition while NPO  Continue steroids  F/u AM CRP  Discussed with patient if no improvement while on steriods, the rule for surgical intervention on this admission.  All questions answered, patient expressed understanding  Discussed with surgical housestaff and GI team.

## 2024-08-15 NOTE — PROGRESS NOTE ADULT - SUBJECTIVE AND OBJECTIVE BOX
GASTROENTEROLOGY PROGRESS NOTE    INTERVAL/SUBJECTIVE:  Patient continues to describe feeling well. However, he still has NG tube in place with about 500cc output overnight. Nurse at bedside confirms suction canister was changed overnight as well. Patient also describes decreased flatus and no bowel movements since 8/14 colonoscopy. Denies overt abdominal pain, nausea, or vomiting.     Allergies    No Known Allergies    Intolerances      MEDICATIONS:  MEDICATIONS  (STANDING):  dextrose 5% + sodium chloride 0.45%. 1000 milliLiter(s) (120 mL/Hr) IV Continuous <Continuous>  heparin   Injectable 5000 Unit(s) SubCutaneous every 8 hours  HYDROmorphone  Injectable 0.2 milliGRAM(s) IV Push every 15 minutes  methylPREDNISolone sodium succinate Injectable 20 milliGRAM(s) IV Push every 8 hours    MEDICATIONS  (PRN):  ondansetron Injectable 4 milliGRAM(s) IV Push every 6 hours PRN Nausea    Vital Signs Last 24 Hrs  T(C): 36.9 (16 Aug 2024 05:19), Max: 37.1 (15 Aug 2024 13:09)  T(F): 98.5 (16 Aug 2024 05:19), Max: 98.7 (15 Aug 2024 13:09)  HR: 65 (16 Aug 2024 05:19) (65 - 83)  BP: 119/70 (16 Aug 2024 05:19) (117/63 - 147/78)  BP(mean): 86 (16 Aug 2024 05:19) (86 - 86)  RR: 17 (16 Aug 2024 05:19) (17 - 18)  SpO2: 94% (16 Aug 2024 05:19) (93% - 97%)    Parameters below as of 16 Aug 2024 05:19  Patient On (Oxygen Delivery Method): room air        08-14 @ 07:01  -  08-15 @ 07:00  --------------------------------------------------------  IN: 2520 mL / OUT: 1675 mL / NET: 845 mL    08-15 @ 07:01  -  08-16 @ 06:45  --------------------------------------------------------  IN: 2640 mL / OUT: 3830 mL / NET: -1190 mL      General: Well developed, well nourished, in no acute distress, ng tube in place  Eyes: Anicteric sclerae, moist conjunctivae, extraocular motions intact, pupils equal round and reactive to light  HENT: Pink and moist mucous membranes, good dentition, tongue normal in appearance without lesions and has symmetrical movement, no obvious oral lesions noted, pharynx normal without tonsillar swelling or exudates  Neck: Trachea midline, supple, no obvious lymphadenopathy  Chest: Symmetric appearing, non tender to palpation  Cardiovascular: Regular rate and rhythm, no obvious murmur rub or gallop  Respiratory: Normal respiratory effort, clear lung sounds in anterior and posterior posts bilaterally, no obvious rales ronchi or wheeze  Abdomen: Symmetric appearing, no obvious lesions, mildly distended, normal bowel sounds, soft, mild tenderness to palpation, no rebound or guarding  Extremities: Normal range of motion, no clubbing cyanosis or edema  Neurological: Awake alert and oriented to person place time and situation  Skin: Warm and dry, no obvious rash, no jaundice    LABS:                        14.1   8.18  )-----------( 318      ( 15 Aug 2024 05:30 )             43.7     08-15    140  |  101  |  16  ----------------------------<  98  4.1   |  29  |  0.73    Ca    9.4      15 Aug 2024 05:30  Phos  3.7     08-15  Mg     2.2     08-15    TPro  7.3  /  Alb  3.9  /  TBili  0.9  /  DBili  x   /  AST  35  /  ALT  33  /  AlkPhos  41  08-15        RADIOLOGY & ADDITIONAL STUDIES: Reviewed

## 2024-08-15 NOTE — PROGRESS NOTE ADULT - ASSESSMENT
This is a 49 year old male with stricturing ileocolonic Crohn's disease (diagnosed about 2 years prior to this presentation, previous exposure to ADA, ileocolic resection with anastomosis about 12 years age) who gastroenterology has been consulted for bowel obstruction. Original concern at time of presentation was for closed loop obstruction, underwent ex-lap and was not found to have signs of closed loop obstruction. Underwent lysis of adhesions and placed on hospital floor.     #Acute small bowel obstruction secondary to ileocolonic anastomotic stricture  #Stricturing ileocolonic Crohn's disease  - Despite initiation of IV methylprednisolone, patient has not had improvement in obstruction. Now without flatus or bowel movements since 8/14 colonoscopy. While unable to reach strictured area due to large stool burden, colonoscopy did reveal that patient was without inflammation in rectum, sigmoid, and descending colon. These findings together could indicate that patient's obstruction due to the stricture at the previous ileocolonic anastomosis may be fibrotic in nature and not inflammatory/due to a Crohn's flare.   - After discussion with surgical primary team and Dr. Cloud, patient is to continue NG tube to low intermittent suction  - Serial abdominal exams  - If patient fails to have further improvement in obstruction may require surgical intervention during this admission.   - Continue with IV methylprednisolone 20mg TID.  - Postoperative care per surgery team    Hector Harkins,   Gastroenterology Fellow  GI Consult Pager Weekdays 7am-5pm: [phone]  Personal Pager Weekdays 7am-5pm: [phone]  Weeknights/Weekend/Holiday Coverage: Please Call the  for Contact Information  Follow Up Questions Welcome via [place] Microsoft Teams Messenger

## 2024-08-16 LAB
24R-OH-CALCIDIOL SERPL-MCNC: 23.7 NG/ML — LOW (ref 30–80)
A1C WITH ESTIMATED AVERAGE GLUCOSE RESULT: 5.1 % — SIGNIFICANT CHANGE UP (ref 4–5.6)
ALBUMIN SERPL ELPH-MCNC: 3.8 G/DL — SIGNIFICANT CHANGE UP (ref 3.3–5)
ALP SERPL-CCNC: 41 U/L — SIGNIFICANT CHANGE UP (ref 40–120)
ALT FLD-CCNC: 33 U/L — SIGNIFICANT CHANGE UP (ref 10–45)
ANION GAP SERPL CALC-SCNC: 12 MMOL/L — SIGNIFICANT CHANGE UP (ref 5–17)
AST SERPL-CCNC: 27 U/L — SIGNIFICANT CHANGE UP (ref 10–40)
BASOPHILS # BLD AUTO: 0.02 K/UL — SIGNIFICANT CHANGE UP (ref 0–0.2)
BASOPHILS NFR BLD AUTO: 0.2 % — SIGNIFICANT CHANGE UP (ref 0–2)
BILIRUB SERPL-MCNC: 0.8 MG/DL — SIGNIFICANT CHANGE UP (ref 0.2–1.2)
BUN SERPL-MCNC: 12 MG/DL — SIGNIFICANT CHANGE UP (ref 7–23)
CALCIUM SERPL-MCNC: 9.4 MG/DL — SIGNIFICANT CHANGE UP (ref 8.4–10.5)
CALPROTECTIN STL-MCNT: 68 UG/G — SIGNIFICANT CHANGE UP (ref 0–120)
CHLORIDE SERPL-SCNC: 103 MMOL/L — SIGNIFICANT CHANGE UP (ref 96–108)
CHOLEST SERPL-MCNC: 212 MG/DL — HIGH
CO2 SERPL-SCNC: 26 MMOL/L — SIGNIFICANT CHANGE UP (ref 22–31)
CREAT SERPL-MCNC: 0.67 MG/DL — SIGNIFICANT CHANGE UP (ref 0.5–1.3)
CRP SERPL-MCNC: 7.7 MG/L — HIGH (ref 0–4)
EGFR: 116 ML/MIN/1.73M2 — SIGNIFICANT CHANGE UP
EOSINOPHIL # BLD AUTO: 0 K/UL — SIGNIFICANT CHANGE UP (ref 0–0.5)
EOSINOPHIL NFR BLD AUTO: 0 % — SIGNIFICANT CHANGE UP (ref 0–6)
ESTIMATED AVERAGE GLUCOSE: 100 MG/DL — SIGNIFICANT CHANGE UP (ref 68–114)
FERRITIN SERPL-MCNC: 77 NG/ML — SIGNIFICANT CHANGE UP (ref 30–400)
FOLATE SERPL-MCNC: 19.6 NG/ML — SIGNIFICANT CHANGE UP
GLUCOSE SERPL-MCNC: 132 MG/DL — HIGH (ref 70–99)
HCT VFR BLD CALC: 42.4 % — SIGNIFICANT CHANGE UP (ref 39–50)
HDLC SERPL-MCNC: 72 MG/DL — SIGNIFICANT CHANGE UP
HGB BLD-MCNC: 13.8 G/DL — SIGNIFICANT CHANGE UP (ref 13–17)
IMM GRANULOCYTES NFR BLD AUTO: 0.5 % — SIGNIFICANT CHANGE UP (ref 0–0.9)
IRON SATN MFR SERPL: 16 % — SIGNIFICANT CHANGE UP (ref 16–55)
IRON SATN MFR SERPL: 44 UG/DL — LOW (ref 45–165)
LIPID PNL WITH DIRECT LDL SERPL: 119 MG/DL — HIGH
LYMPHOCYTES # BLD AUTO: 1.27 K/UL — SIGNIFICANT CHANGE UP (ref 1–3.3)
LYMPHOCYTES # BLD AUTO: 13.7 % — SIGNIFICANT CHANGE UP (ref 13–44)
MAGNESIUM SERPL-MCNC: 2.2 MG/DL — SIGNIFICANT CHANGE UP (ref 1.6–2.6)
MCHC RBC-ENTMCNC: 26.6 PG — LOW (ref 27–34)
MCHC RBC-ENTMCNC: 32.5 GM/DL — SIGNIFICANT CHANGE UP (ref 32–36)
MCV RBC AUTO: 81.9 FL — SIGNIFICANT CHANGE UP (ref 80–100)
MONOCYTES # BLD AUTO: 0.83 K/UL — SIGNIFICANT CHANGE UP (ref 0–0.9)
MONOCYTES NFR BLD AUTO: 9 % — SIGNIFICANT CHANGE UP (ref 2–14)
NEUTROPHILS # BLD AUTO: 7.07 K/UL — SIGNIFICANT CHANGE UP (ref 1.8–7.4)
NEUTROPHILS NFR BLD AUTO: 76.6 % — SIGNIFICANT CHANGE UP (ref 43–77)
NON HDL CHOLESTEROL: 140 MG/DL — HIGH
NRBC # BLD: 0 /100 WBCS — SIGNIFICANT CHANGE UP (ref 0–0)
PHOSPHATE SERPL-MCNC: 3.3 MG/DL — SIGNIFICANT CHANGE UP (ref 2.5–4.5)
PLATELET # BLD AUTO: 330 K/UL — SIGNIFICANT CHANGE UP (ref 150–400)
POTASSIUM SERPL-MCNC: 3.9 MMOL/L — SIGNIFICANT CHANGE UP (ref 3.5–5.3)
POTASSIUM SERPL-SCNC: 3.9 MMOL/L — SIGNIFICANT CHANGE UP (ref 3.5–5.3)
PREALB SERPL-MCNC: 17 MG/DL — LOW (ref 20–40)
PROT SERPL-MCNC: 7.3 G/DL — SIGNIFICANT CHANGE UP (ref 6–8.3)
PTH-INTACT IO % DIF SERPL: 35.6 PG/ML — SIGNIFICANT CHANGE UP (ref 15–65)
RBC # BLD: 5.18 M/UL — SIGNIFICANT CHANGE UP (ref 4.2–5.8)
RBC # FLD: 13 % — SIGNIFICANT CHANGE UP (ref 10.3–14.5)
SODIUM SERPL-SCNC: 141 MMOL/L — SIGNIFICANT CHANGE UP (ref 135–145)
TIBC SERPL-MCNC: 281 UG/DL — SIGNIFICANT CHANGE UP (ref 220–430)
TRIGL SERPL-MCNC: 120 MG/DL — SIGNIFICANT CHANGE UP
TRIGL SERPL-MCNC: 132 MG/DL — SIGNIFICANT CHANGE UP
TSH SERPL-MCNC: 1.75 UIU/ML — SIGNIFICANT CHANGE UP (ref 0.27–4.2)
UIBC SERPL-MCNC: 237 UG/DL — SIGNIFICANT CHANGE UP (ref 110–370)
VIT B12 SERPL-MCNC: 755 PG/ML — SIGNIFICANT CHANGE UP (ref 232–1245)
WBC # BLD: 9.24 K/UL — SIGNIFICANT CHANGE UP (ref 3.8–10.5)
WBC # FLD AUTO: 9.24 K/UL — SIGNIFICANT CHANGE UP (ref 3.8–10.5)

## 2024-08-16 PROCEDURE — 99222 1ST HOSP IP/OBS MODERATE 55: CPT

## 2024-08-16 PROCEDURE — 99232 SBSQ HOSP IP/OBS MODERATE 35: CPT | Mod: GC

## 2024-08-16 PROCEDURE — 74177 CT ABD & PELVIS W/CONTRAST: CPT | Mod: 26

## 2024-08-16 RX ORDER — ELECTROLYTE SOLUTION,INJ
1 VIAL (ML) INTRAVENOUS
Refills: 0 | Status: DISCONTINUED | OUTPATIENT
Start: 2024-08-16 | End: 2024-08-16

## 2024-08-16 RX ORDER — IOHEXOL 350 MG/ML
30 INJECTION, SOLUTION INTRAVENOUS ONCE
Refills: 0 | Status: COMPLETED | OUTPATIENT
Start: 2024-08-16 | End: 2024-08-16

## 2024-08-16 RX ORDER — SODIUM CHLORIDE 9 MG/ML
10 INJECTION INTRAMUSCULAR; INTRAVENOUS; SUBCUTANEOUS
Refills: 0 | Status: DISCONTINUED | OUTPATIENT
Start: 2024-08-16 | End: 2024-08-21

## 2024-08-16 RX ORDER — MENTHOL/CETYLPYRD CL 3 MG
1 LOZENGE MUCOUS MEMBRANE ONCE
Refills: 0 | Status: DISCONTINUED | OUTPATIENT
Start: 2024-08-16 | End: 2024-08-19

## 2024-08-16 RX ORDER — TUBERCULIN,PPD,MULTI-PUNCTURE
5 SKIN TEST INTRADERMAL ONCE
Refills: 0 | Status: COMPLETED | OUTPATIENT
Start: 2024-08-16 | End: 2024-08-16

## 2024-08-16 RX ORDER — I.V. FAT EMULSION 20 G/100ML
0.64 EMULSION INTRAVENOUS
Qty: 50 | Refills: 0 | Status: DISCONTINUED | OUTPATIENT
Start: 2024-08-16 | End: 2024-08-16

## 2024-08-16 RX ORDER — POTASSIUM CHLORIDE 10 MEQ
10 TABLET, EXT RELEASE, PARTICLES/CRYSTALS ORAL ONCE
Refills: 0 | Status: COMPLETED | OUTPATIENT
Start: 2024-08-16 | End: 2024-08-16

## 2024-08-16 RX ORDER — CHLORHEXIDINE GLUCONATE 40 MG/ML
1 SOLUTION TOPICAL
Refills: 0 | Status: DISCONTINUED | OUTPATIENT
Start: 2024-08-16 | End: 2024-08-21

## 2024-08-16 RX ADMIN — Medication 20 MILLIGRAM(S): at 00:19

## 2024-08-16 RX ADMIN — Medication 20 MILLIGRAM(S): at 15:44

## 2024-08-16 RX ADMIN — Medication 5000 UNIT(S): at 23:45

## 2024-08-16 RX ADMIN — I.V. FAT EMULSION 20.8 GM/KG/DAY: 20 EMULSION INTRAVENOUS at 18:15

## 2024-08-16 RX ADMIN — Medication 100 MILLIEQUIVALENT(S): at 09:36

## 2024-08-16 RX ADMIN — Medication 5000 UNIT(S): at 00:19

## 2024-08-16 RX ADMIN — IOHEXOL 30 MILLILITER(S): 350 INJECTION, SOLUTION INTRAVENOUS at 07:37

## 2024-08-16 RX ADMIN — Medication 5000 UNIT(S): at 07:18

## 2024-08-16 RX ADMIN — Medication 5 UNIT(S): at 12:05

## 2024-08-16 RX ADMIN — Medication 20 MILLIGRAM(S): at 23:45

## 2024-08-16 RX ADMIN — Medication 20 MILLIGRAM(S): at 07:18

## 2024-08-16 RX ADMIN — Medication 5000 UNIT(S): at 15:45

## 2024-08-16 RX ADMIN — Medication 1 EACH: at 18:14

## 2024-08-16 NOTE — PROGRESS NOTE ADULT - ASSESSMENT
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 for abdominal pain. Patient afebrile, HD normal on presentation without acute laboratory abnormalaties noted. CTAP concerning for closed loop obstruction with two distinct transition points. Now s/p dx lap, lysis of adhesions (8/11).    NPO/IVF  NGT to LIWS  Pain/nausea control PRN  HSQ/SCDs/IS/OOBA  GI Consult - 20 mg Methylprednisolone TID  AM labs  AM Abdominal X-Ray      Plan discussed with attending and chief resident.   ____________________________________________________  Kylah Garcia - Resident   Surgery  49M with PMHx of Crohn's disease (diagnosed in 2021, not currently on medications) and PSHx of SBR of terminal ileum (10 years ago) who presents to [facility] for abdominal pain. Patient afebrile, HD normal on presentation without acute laboratory abnormalaties noted. CTAP concerning for closed loop obstruction with two distinct transition points. Now s/p dx lap, lysis of adhesions (8/11).    NPO/IVF  NGT to LIWS  Pain/nausea control PRN  HSQ/SCDs/IS/OOBA  GI Consult - 20 mg Methylprednisolone TID  AM labs  CT Enterography      Plan discussed with attending and chief resident.   ____________________________________________________  Kylah Garcia - Resident   Surgery

## 2024-08-16 NOTE — CONSULT NOTE ADULT - CONSULT REQUESTED BY NAME
Dr. Cloud
Dr. Cloud
Dr Molina/Dr Cevallos - Acute [facility] Surgery
Surgery

## 2024-08-16 NOTE — DIETITIAN INITIAL EVALUATION ADULT - PERTINENT LABORATORY DATA
08-16    141  |  103  |  12  ----------------------------<  132<H>  3.9   |  26  |  0.67    Ca    9.4      16 Aug 2024 07:01  Phos  3.3     08-16  Mg     2.2     08-16    TPro  7.3  /  Alb  3.8  /  TBili  0.8  /  DBili  x   /  AST  27  /  ALT  33  /  AlkPhos  41  08-16  A1C with Estimated Average Glucose Result: 5.1 % (08-16-24 @ 07:01)

## 2024-08-16 NOTE — CONSULT NOTE ADULT - SUBJECTIVE AND OBJECTIVE BOX
Vascular Access Service Consult Note    [facility] ISSUES - PROBLEM Dx:             Diagnosis: 49 year old male with chrons requiring picc for TPN    Indications for Vascular Access (Check all that apply)  [  ]  Antibiotic Therapy       Antibiotic Prescribed:                                                                             Expected Duration of Therapy:               [  ]  IV Hydration  [  X]  Total Parenteral Nutrition  [  ]  Chemotherapy  [  ]  Difficult Venous Access  [  ]  CVP monitoring  [  ]  Medications with high potential for tissue necrosis on extravasation  [  ]  Other    Screening (Check all that apply)  Previous Radiation to chest  [  ] Yes      [  X]  No  Breast Cancer                          [  ] Left     [  ]  Right    [ X ]  No  Lymph Node Dissection         [  ] Left     [  ]  Right    [  X]  No  Pacemaker or ICD                   [  ] Left     [  ]  Right    [X  ]  No  Upper Extremity DVT             [  ] Left     [  ]  Right    [  X]  No  Chronic Kidney Disease         [  ]  Yes     [ X ]  No  Hemodialysis                           [  ]  Yes     [ X ]  No  AV Fistula/ Graft                     [  ]  Left    [  ]  Right    [ X ]  No  Temp>101F in past 24 H       [  ]  Yes     [  X]  No  H/O PICC/Midline                   [  ]  Yes     [ X ]  No    Lab data:                        13.8   9.24  )-----------( 330      ( 16 Aug 2024 07:01 )             42.4     08-16    141  |  103  |  12  ----------------------------<  132<H>  3.9   |  26  |  0.67    Ca    9.4      16 Aug 2024 07:01  Phos  3.3     08-16  Mg     2.2     08-16    TPro  7.3  /  Alb  3.8  /  TBili  0.8  /  DBili  x   /  AST  27  /  ALT  33  /  AlkPhos  41  08-16              I have reviewed the chart, interviewed and examined the patient and determined that this patient:  [X  ] Is a candidate for a PICC line  [  ] Is a candidate for a Midline  [  ] Is not a candidate for vascular access device (reason)    Lumens:    [ X ] Single  [  ] Double

## 2024-08-16 NOTE — DIETITIAN INITIAL EVALUATION ADULT - OTHER CALCULATIONS
Ideal body weight (61.6kg) used to calculate energy needs due to pt's current body weight exceeds  % (125%) ideal body weight. Needs adjusted for malnutrition.

## 2024-08-16 NOTE — DIETITIAN INITIAL EVALUATION ADULT - EDUCATION DIETARY MODIFICATIONS
Discussed with pt about possible TPN. Answered questions related to alternate means of nutrition. Pt receptive and verbalizes understanding./(2) meets goals/outcomes/verbalization

## 2024-08-16 NOTE — CONSULT NOTE ADULT - ASSESSMENT
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 Patient afebrile, HD normal on presentation without acute laboratory abnormalaties noted. CTAP concerning for closed loop obstruction with two distinct transition points. Now s/p dx lap, lysis of adhesions (8/11). Medicine consulted for indeterminant quantiferon result.    #Crohn's disease  #Indeterminant Quant Result  Patient underwent TB testing per GI in preparation for possible immunosuppressive therapy, currently on methylpred IV 20mg TID  Hep B Ag, Ab, and Core Ab negative  Indeterminant result likely as a result from incorrect collection of specimen, patient has no risk factors for TB, no known exposures.  - Recommend repeat quantiferon TB test  - Recommend also placing PPD under skin now so that if next quant is also indeterminant, can read PPD test in 48 hours as alternative testing for TB exposure  - Rest of care per surgery and GI    Thank you for this consult, medicine will follow.     Case discussed with Dr. Walters

## 2024-08-16 NOTE — PROGRESS NOTE ADULT - ATTENDING COMMENTS
CRS Attending  Seen on am rounds with surgical housestaff team  Patient denies pain  NGT output >1 L for 24hrs.  Xray yesterday noted dilated colon, no small bowel dilation  Remains on steroids   CT enterography ordered. CRS Attending  Seen on am rounds with surgical housestaff team  Patient denies pain  NGT output >1 L for 24hrs.  Xray yesterday noted dilated colon, no small bowel dilation  Remains on steroids, CRP continues to downtrend  CT enterography ordered.  Reviewed with patient if no improvement while on steroids, the role for surgical intervention on this admission.  All questions answered.  CRS Weekend Attending Coverage 8/16-8/18 Dr Jade

## 2024-08-16 NOTE — DIETITIAN INITIAL EVALUATION ADULT - OTHER INFO
Visited pt at bedside on 9UR for initial assessment. States that at baseline pt has a good appetite, however for the past week, pt has not consumed anything. Noted with Crohn's and sbo. No cultural, ethnic, Sabianism food preferences noted. Confirms No known food allergies. Current diet: NPO. Pt has NGT for suctioning, noted with 1475 ml output. No issues with N/V/C/D at this time. Last BM noted 8/15. Dosing weight: 8/14 171 pounds; UBW: unable to quantify, however states that he does not feel as if he lost weight. IBW: 136 pounds %IBW: 125%; Observed with overt signs and symptoms of muscle or fat wasting. Based on ASPEN guidelines, pt does in fact meet criteria for malnutrition. No difficulties chewing or swallowing reported per pt at time of visit. No Pressure Injuries per flow sheets. Surgical incision: abdomen, Nabil Score: 20; No Edema per flow sheets. Meds reviewed. 5% dex, .45% NaCl IV. Nutritionally Pertinent Labs 8/16 Gluc: 132 (H). See Nutrition recommendations below.

## 2024-08-16 NOTE — CONSULT NOTE ADULT - SUBJECTIVE AND OBJECTIVE BOX
Med Consult Note  ==============================================================================================================  HPI: 49y Male  HPI:  Patient is a 49M with PMHx of Crohn's disease (diagnosed in 2021, not currently on medications) and PSHx of SBR of terminal ileum (10 years ago) who presents to [facility] for abdominal pain. Patient reports the pain started yesterday afternoon and has been progressively worsening. Describes the pain as an intermittent, sharp pain, mostly in lower abdomen that is 5/10 at its worst. Denies nausea or emesis. States last BM was at 7 PM and last flatus was Friday PM. Denies fevers or chills at home. Also endorses poor PO intake over the past 24 hours secondary to poor appetite, but states he feels hungry now. States pain is nearly identical to prior Crohn's flares.    Medical History: Crohns  Surgical History: SBR  Medications: Denies  Allergies: Denies  Social History: Nonsmoker. No alcohol use  Scope: Last c-scope 2021 without acute findigs  No family history of CRC, IBD.    In the ED, patient afebrile, nontoxic appearing:   - VITALS: Afebrile T 97.5 F, HR 79, /77 saturating well on RA  - LABORATORY: No acute abnormalaties noted  - IMAGING: CTAP concerning for closed loop obstruction with 2 transition points - one at ileocolic anastamosis and one proximal with bowel wall thickeing (ischemia vs. active Crohns) (11 Aug 2024 04:44)    Medicine addendum  Patient seen and examined by bedside. Medicine consulted for indeterminant quantiferon result, which was recommended by GI for possible initiation of immunomodulator therapy. Patient denies any cough, hemoptysis, recent weight loss, night sweats, or fever. He was [place] and has lived here his entire life. No known exposures to TB or prior TB testing. Recent travel includes to [place] approx 2 months ago.     PAST MEDICAL & SURGICAL HISTORY:  Crohn's disease      S/P small bowel resection        Home Meds: Home Medications:    Allergies: Allergies    No Known Allergies    Intolerances      Soc:   Advanced Directives: Presumed Full Code     CURRENT MEDICATIONS:   --------------------------------------------------------------------------------------  Neurologic Medications  HYDROmorphone  Injectable 0.2 milliGRAM(s) IV Push every 15 minutes  ondansetron Injectable 4 milliGRAM(s) IV Push every 6 hours PRN Nausea    Respiratory Medications    Cardiovascular Medications    Gastrointestinal Medications  dextrose 5% + sodium chloride 0.45%. 1000 milliLiter(s) IV Continuous <Continuous>    Genitourinary Medications    Hematologic/Oncologic Medications  heparin   Injectable 5000 Unit(s) SubCutaneous every 8 hours  PPD  5 Tuberculin Unit(s) Injectable 5 Unit(s) IntraDermal once    Antimicrobial/Immunologic Medications    Endocrine/Metabolic Medications  methylPREDNISolone sodium succinate Injectable 20 milliGRAM(s) IV Push every 8 hours    Topical/Other Medications    --------------------------------------------------------------------------------------    VITAL SIGNS, INS/OUTS (last 24 hours):  --------------------------------------------------------------------------------------  ICU Vital Signs Last 24 Hrs  T(C): 36.8 (16 Aug 2024 08:35), Max: 37.1 (15 Aug 2024 13:09)  T(F): 98.2 (16 Aug 2024 08:35), Max: 98.7 (15 Aug 2024 13:09)  HR: 64 (16 Aug 2024 08:35) (64 - 75)  BP: 120/77 (16 Aug 2024 08:35) (117/63 - 132/75)  BP(mean): 86 (16 Aug 2024 05:19) (86 - 86)  ABP: --  ABP(mean): --  RR: 17 (16 Aug 2024 08:35) (17 - 18)  SpO2: 95% (16 Aug 2024 08:35) (93% - 95%)    O2 Parameters below as of 16 Aug 2024 08:35  Patient On (Oxygen Delivery Method): room air          I&O's Summary    15 Aug 2024 07:01  -  16 Aug 2024 07:00  --------------------------------------------------------  IN: 2640 mL / OUT: 3930 mL / NET: -1290 mL    16 Aug 2024 07:01  -  16 Aug 2024 11:48  --------------------------------------------------------  IN: 600 mL / OUT: 150 mL / NET: 450 mL      --------------------------------------------------------------------------------------    PHYSICAL EXAM:    General: NAD  HEENT: NC/AT; PERRL, anicteric sclera; MMM. NGT in place.  Neck: supple  Cardiovascular: +S1/S2; RRR  Respiratory: CTA B/L; no W/R/R  Gastrointestinal: soft, NT/ND; +BSx4. Lap incisions healing well without erythema.  Extremities: WWP; no edema, clubbing or cyanosis  Vascular: 2+ radial, DP/PT pulses B/L  Neurological: AAOx3; no focal deficits    LABS  --------------------------------------------------------------------------------------  Labs:  CAPILLARY BLOOD GLUCOSE                              13.8   9.24  )-----------( 330      ( 16 Aug 2024 07:01 )             42.4       Auto Neutrophil %: 76.6 % (08-16-24 @ 07:01)  Auto Immature Granulocyte %: 0.5 % (08-16-24 @ 07:01)    08-16    141  |  103  |  12  ----------------------------<  132<H>  3.9   |  26  |  0.67      Calcium: 9.4 mg/dL (08-16-24 @ 07:01)      LFTs:             7.3  | 0.8  | 27       ------------------[41      ( 16 Aug 2024 07:01 )  3.8  | x    | 33          Lipase:x      Amylase:x             Coags:            Urinalysis Basic - ( 16 Aug 2024 07:01 )    Color: x / Appearance: x / SG: x / pH: x  Gluc: 132 mg/dL / Ketone: x  / Bili: x / Urobili: x   Blood: x / Protein: x / Nitrite: x   Leuk Esterase: x / RBC: x / WBC x   Sq Epi: x / Non Sq Epi: x / Bacteria: x          --------------------------------------------------------------------------------------    OTHER LABS    IMAGING RESULTS  ****************

## 2024-08-16 NOTE — DIETITIAN INITIAL EVALUATION ADULT - REASON INDICATOR FOR ASSESSMENT
Nutrition consult warranted for: TPN/PPN   Information obtained from: electronic medical record and patient  Chart reviewed, events noted.

## 2024-08-16 NOTE — DIETITIAN INITIAL EVALUATION ADULT - REASON FOR ADMISSION
SBO  "This is a 49 year old male with stricturing ileocolonic Crohn's disease (diagnosed about 2 years prior to this presentation, previous exposure to ADA, ileocolic resection with anastomosis about 12 years age) who gastroenterology has been consulted for bowel obstruction. Original concern at time of presentation was for closed loop obstruction, underwent ex-lap and was not found to have signs of closed loop obstruction. Underwent lysis of adhesions and placed on hospital floor. Underwent colonoscopy 8/14 which identified no inflammation in rectum, sigmoid, and descending colon with solid stool in transverse, preventing evaluation of anastomotic stricture. "

## 2024-08-16 NOTE — CONSULT NOTE ADULT - ATTENDING COMMENTS
seen and examined with Dr. Elliott  ambulatory, no abdominal pain , watery bm, ? pass gas, NGT in place.  - IBD-concern flare- on steroids, Quanteferon indermenate  - to repeat Quanteferon, to place PPD as well  no known risk factor   - to check hep B core antibody  rest of care by primary team, GI appreciated.    Thank you for allowing medicine to participate in the care, will follow.

## 2024-08-16 NOTE — DIETITIAN NUTRITION RISK NOTIFICATION - ADDITIONAL COMMENTS/DIETITIAN RECOMMENDATIONS
Visited pt at bedside on 9UR for initial assessment. States that at baseline pt has a good appetite, however for the past week, pt has not consumed anything. Noted with Crohn's and sbo. No cultural, ethnic, Mosque food preferences noted. Confirms No known food allergies. Current diet: NPO. Pt has NGT for suctioning, noted with 1475 ml output. No issues with N/V/C/D at this time. Last BM noted 8/15. Dosing weight: 8/14 171 pounds; UBW: unable to quantify, however states that he does not feel as if he lost weight. IBW: 136 pounds %IBW: 125%; Observed with overt signs and symptoms of muscle or fat wasting. Based on ASPEN guidelines, pt does in fact meet criteria for malnutrition. No difficulties chewing or swallowing reported per pt at time of visit. No Pressure Injuries per flow sheets. Surgical incision: abdomen, Nabil Score: 20; No Edema per flow sheets. Meds reviewed. 5% dex, .45% NaCl IV. Nutritionally Pertinent Labs 8/16 Gluc: 132 (H). See Nutrition recommendations below.    TPN via feasible route:  341g Dextrose, 85g AA, 50g 20% Lipids to provide in total  1999Kcal, 85g protein, GIR of 3.0 based on actual body weight (78kg), 1.3grams protein/kg ideal body weight (61.6kg)  Recommend checking TG before starting TPN and then check TG weekly. Check Magnesium, Phoshorus, potassium daily and POC BG Q6hrs. Trend daily weights. Fluids and electrolytes per MD discretion. Start at 100g Dextrose on Day 1, 200g Dextrose on Day 2, 300g Dextrose on Day 3 and advance to goal of 341g Dextrose on Day 4.   >>>MVI, thiamine in bag  2. Weekly lipid panel while on TPN, next 8/23  >>>hold lipid inj. if TG >400  3. Pain regimen per team   4. RDN will continue to monitor, reassess, and intervene as appropriate.    Discussed with pt about possible TPN. Answered questions related to alternate means of nutrition. Pt receptive and verbalizes understanding.

## 2024-08-16 NOTE — DIETITIAN INITIAL EVALUATION ADULT - NSFNSGIIOFT_GEN_A_CORE
08-15-24 @ 07:01  -  08-16-24 @ 07:00  --------------------------------------------------------  OUT:    Nasogastric/Oral tube (mL): 1730 mL  Total OUT: 1730 mL    Total NET: -1730 mL      08-16-24 @ 07:01  -  08-16-24 @ 11:47  --------------------------------------------------------  OUT:    Nasogastric/Oral tube (mL): 150 mL  Total OUT: 150 mL    Total NET: -150 mL

## 2024-08-16 NOTE — DIETITIAN INITIAL EVALUATION ADULT - NUTRITIONGOAL OUTCOME1
Pt to meet >75% of estimated nutrition needs daily per course of hospitalization via optimal route.

## 2024-08-16 NOTE — PROGRESS NOTE ADULT - SUBJECTIVE AND OBJECTIVE BOX
HX: POD 5 s/p Dx lap and MEL     SUBJECTIVE: Patient seen and examined bedside by chief resident. Patient reports feeling okay, no abdominal pain.    heparin   Injectable 5000 Unit(s) SubCutaneous every 8 hours    MEDICATIONS  (PRN):  ondansetron Injectable 4 milliGRAM(s) IV Push every 6 hours PRN Nausea      I&O's Detail    15 Aug 2024 07:01  -  16 Aug 2024 07:00  --------------------------------------------------------  IN:    dextrose 5% + sodium chloride 0.45%: 2640 mL  Total IN: 2640 mL    OUT:    Nasogastric/Oral tube (mL): 1630 mL    Oral Fluid: 0 mL    Voided (mL): 2200 mL  Total OUT: 3830 mL    Total NET: -1190 mL          Vital Signs Last 24 Hrs  T(C): 36.9 (16 Aug 2024 05:19), Max: 37.1 (15 Aug 2024 13:09)  T(F): 98.5 (16 Aug 2024 05:19), Max: 98.7 (15 Aug 2024 13:09)  HR: 65 (16 Aug 2024 05:19) (65 - 83)  BP: 119/70 (16 Aug 2024 05:19) (117/63 - 147/78)  BP(mean): 86 (16 Aug 2024 05:19) (86 - 86)  RR: 17 (16 Aug 2024 05:19) (17 - 18)  SpO2: 94% (16 Aug 2024 05:19) (93% - 97%)    Parameters below as of 16 Aug 2024 05:19  Patient On (Oxygen Delivery Method): room air        PHYSICAL EXAM:   Gen: NAD, resting comfortably in bed, NGT in place to LIWS   CV: NSR  Pulm: no respiratory distress on RA, CTAB   Abd: soft, mildly distended, NTTP, no rebound or guarding   Ext: WWP, no edema   Neuro: motor/sensory grossly intact     LABS:                        14.1   8.18  )-----------( 318      ( 15 Aug 2024 05:30 )             43.7     08-15    140  |  101  |  16  ----------------------------<  98  4.1   |  29  |  0.73    Ca    9.4      15 Aug 2024 05:30  Phos  3.7     08-15  Mg     2.2     08-15    TPro  7.3  /  Alb  3.9  /  TBili  0.9  /  DBili  x   /  AST  35  /  ALT  33  /  AlkPhos  41  08-15    LIVER FUNCTIONS - ( 15 Aug 2024 05:30 )  Alb: 3.9 g/dL / Pro: 7.3 g/dL / ALK PHOS: 41 U/L / ALT: 33 U/L / AST: 35 U/L / GGT: x             CAPILLARY BLOOD GLUCOSE            Urinalysis Basic - ( 15 Aug 2024 05:30 )    Color: x / Appearance: x / SG: x / pH: x  Gluc: 98 mg/dL / Ketone: x  / Bili: x / Urobili: x   Blood: x / Protein: x / Nitrite: x   Leuk Esterase: x / RBC: x / WBC x   Sq Epi: x / Non Sq Epi: x / Bacteria: x        Culture - Urine (collected 11 Aug 2024 01:02)  Source: Clean Catch Clean Catch (Midstream)  Final Report (12 Aug 2024 07:18):    <10,000 CFU/mL Normal Urogenital Sita           RADIOLOGY & ADDITIONAL STUDIES:         HX: POD 5 s/p Dx lap and MEL     SUBJECTIVE: Patient seen and examined bedside by chief resident. Patient reports feeling okay, no abdominal pain.    heparin   Injectable 5000 Unit(s) SubCutaneous every 8 hours    MEDICATIONS  (PRN):  ondansetron Injectable 4 milliGRAM(s) IV Push every 6 hours PRN Nausea      I&O's Detail    15 Aug 2024 07:01  -  16 Aug 2024 07:00  --------------------------------------------------------  IN:    dextrose 5% + sodium chloride 0.45%: 2640 mL  Total IN: 2640 mL    OUT:    Nasogastric/Oral tube (mL): 1630 mL    Oral Fluid: 0 mL    Voided (mL): 2200 mL  Total OUT: 3830 mL    Total NET: -1190 mL          Vital Signs Last 24 Hrs  T(C): 36.9 (16 Aug 2024 05:19), Max: 37.1 (15 Aug 2024 13:09)  T(F): 98.5 (16 Aug 2024 05:19), Max: 98.7 (15 Aug 2024 13:09)  HR: 65 (16 Aug 2024 05:19) (65 - 83)  BP: 119/70 (16 Aug 2024 05:19) (117/63 - 147/78)  BP(mean): 86 (16 Aug 2024 05:19) (86 - 86)  RR: 17 (16 Aug 2024 05:19) (17 - 18)  SpO2: 94% (16 Aug 2024 05:19) (93% - 97%)    Parameters below as of 16 Aug 2024 05:19  Patient On (Oxygen Delivery Method): room air        PHYSICAL EXAM:   Gen: NAD, resting comfortably in bed, NGT in place to LIWS   CV: NSR  Pulm: no respiratory distress on RA, CTAB   Abd: soft, mildly distended, NTTP, no rebound or guarding   Ext: WWP, no edema   Neuro: motor/sensory grossly intact     LABS:                        14.1   8.18  )-----------( 318      ( 15 Aug 2024 05:30 )             43.7     08-15    140  |  101  |  16  ----------------------------<  98  4.1   |  29  |  0.73    Ca    9.4      15 Aug 2024 05:30  Phos  3.7     08-15  Mg     2.2     08-15    TPro  7.3  /  Alb  3.9  /  TBili  0.9  /  DBili  x   /  AST  35  /  ALT  33  /  AlkPhos  41  08-15    LIVER FUNCTIONS - ( 15 Aug 2024 05:30 )  Alb: 3.9 g/dL / Pro: 7.3 g/dL / ALK PHOS: 41 U/L / ALT: 33 U/L / AST: 35 U/L / GGT: x             CAPILLARY BLOOD GLUCOSE            Urinalysis Basic - ( 15 Aug 2024 05:30 )    Color: x / Appearance: x / SG: x / pH: x  Gluc: 98 mg/dL / Ketone: x  / Bili: x / Urobili: x   Blood: x / Protein: x / Nitrite: x   Leuk Esterase: x / RBC: x / WBC x   Sq Epi: x / Non Sq Epi: x / Bacteria: x        Culture - Urine (collected 11 Aug 2024 01:02)  Source: Clean Catch Clean Catch (Midstream)  Final Report (12 Aug 2024 07:18):    <10,000 CFU/mL Normal Urogenital Sita           RADIOLOGY & ADDITIONAL STUDIES:      ******PRELIMINARY REPORT******      ******PRELIMINARY REPORT******         ACC: [id number] EXAM:  XR ABDOMEN 2V   ORDERED BY: ANA HOLDEN     PROCEDURE DATE:  08/15/2024    ******PRELIMINARY REPORT******      ******PRELIMINARY REPORT******           INTERPRETATION:  XR ABDOMEN 2 VIEWS dated 8/15/2024 at 7:15 AM    INDICATION: F/u SBO progression    TECHNIQUE:  Portable  frontal supine view(s) of the abdomen.    COMPARISON: None.    FINDINGS:    Dilated loops of large bowel measuring up to 9.8 cm at the hepatic   flexure and 8.4 cm along the ascending colon. No significant distention   of distal large bowel. Findings may represent large bowel obstruction.   Small bowel within normal limits. Evaluation for free air is limited   without upright view. Visualized lung bases are unremarkable. No acute   abnormalities of the soft tissues and osseous structures.    Lines/devices: Enteric tube in satisfactory position.    IMPRESSION:    Dilated loops of large bowel measuring up to 9.8 cm at the hepatic   flexure and 8.4 cm along the ascending colon with no significant   distention of distal large bowel. Findings are present large bowel   obstruction.        ******PRELIMINARY REPORT******      ******PRELIMINARY REPORT******       JONA EAGLE MD; Resident Radiologist  This document is a PRELIMINARY interpretation and is pending final   attending approval. Aug 15 2024  8:59PM

## 2024-08-16 NOTE — DIETITIAN INITIAL EVALUATION ADULT - PERTINENT MEDS FT
MEDICATIONS  (STANDING):  dextrose 5% + sodium chloride 0.45%. 1000 milliLiter(s) (120 mL/Hr) IV Continuous <Continuous>  heparin   Injectable 5000 Unit(s) SubCutaneous every 8 hours  HYDROmorphone  Injectable 0.2 milliGRAM(s) IV Push every 15 minutes  methylPREDNISolone sodium succinate Injectable 20 milliGRAM(s) IV Push every 8 hours  PPD  5 Tuberculin Unit(s) Injectable 5 Unit(s) IntraDermal once    MEDICATIONS  (PRN):  ondansetron Injectable 4 milliGRAM(s) IV Push every 6 hours PRN Nausea

## 2024-08-16 NOTE — PROGRESS NOTE ADULT - ASSESSMENT
This is a 49 year old male with stricturing ileocolonic Crohn's disease (diagnosed about 2 years prior to this presentation, previous exposure to ADA, ileocolic resection with anastomosis about 12 years age) who gastroenterology has been consulted for bowel obstruction. Original concern at time of presentation was for closed loop obstruction, underwent ex-lap and was not found to have signs of closed loop obstruction. Underwent lysis of adhesions and placed on hospital floor. Underwent colonoscopy 8/14 which identified no inflammation in rectum, sigmoid, and descending colon with solid stool in transverse, preventing evaluation of anastomotic stricture.     #Acute small bowel obstruction secondary to ileocolonic anastomotic stricture, likely fibrotic  #Stricturing ileocolonic Crohn's disease  - Patient remains obstructed since admission, has continued NG tube output greater than 1 liter per day, and abdominal xray indicates dilated loops of large bowel up to 9.8cm  - Surgery team describes tentative planning for surgical intervention Wednesday 8/21, 10 days after initial presentation with obstruction  - Despite initiation of IV methylprednisolone, patient has not had improvement in obstruction. Now without flatus or bowel movements since 8/14 colonoscopy. While unable to reach strictured area due to large stool burden, colonoscopy did reveal that patient was without inflammation in rectum, sigmoid, and descending colon. These findings together could indicate that patient's obstruction due to the stricture at the previous ileocolonic anastomosis may be fibrotic in nature and not inflammatory/due to a Crohn's flare.   - Please initiate calcium and vitamin D while patient continues steroids  - Consider decreasing steroid dose to limit patient's steroid exposure if deemed that patient is not receiving benefit from steroids  - Serial abdominal exams  - Postoperative care per surgery team    Hector Harkins,   Gastroenterology Fellow  GI Consult Pager Weekdays 7am-5pm: [phone]  Personal Pager Weekdays 7am-5pm: [phone]  Weeknights/Weekend/Holiday Coverage: Please Call the  for Contact Information  Follow Up Questions Welcome via [employer] Teams Messenger This is a 49 year old male with stricturing ileocolonic Crohn's disease (diagnosed about 2 years prior to this presentation, previous exposure to ADA, ileocolic resection with anastomosis about 12 years age) who gastroenterology has been consulted for bowel obstruction. Original concern at time of presentation was for closed loop obstruction, underwent ex-lap and was not found to have signs of closed loop obstruction. Underwent lysis of adhesions and placed on hospital floor. Underwent colonoscopy 8/14 which identified no inflammation in rectum, sigmoid, and descending colon with solid stool in transverse, preventing evaluation of anastomotic stricture.     #Acute small bowel obstruction secondary to ileocolonic anastomotic stricture, likely fibrotic  #Stricturing ileocolonic Crohn's disease  - Patient remains obstructed since admission, has continued NG tube output greater than 1 liter per day, and abdominal xray indicates dilated loops of large bowel up to 9.8cm. Patient also with oral contrast identified on abdominal xray 5 days after CTE, which could also indicate continued obstruction.   - Surgery team describes tentative planning for surgical intervention Wednesday 8/21, 10 days after initial presentation with obstruction  - Despite initiation of IV methylprednisolone, patient has not had improvement in obstruction. Now without flatus or bowel movements since 8/14 colonoscopy. While unable to reach strictured area due to large stool burden, colonoscopy did reveal that patient was without inflammation in rectum, sigmoid, and descending colon. These findings together could indicate that patient's obstruction due to the stricture at the previous ileocolonic anastomosis may be fibrotic in nature and not inflammatory/due to a Crohn's flare.   - Please initiate calcium and vitamin D while patient continues steroids  - Consider decreasing steroid dose to limit patient's steroid exposure if deemed that patient is not receiving benefit from steroids  - Serial abdominal exams  - Postoperative care per surgery team    Hector Harkins,   Gastroenterology Fellow  GI Consult Pager Weekdays 7am-5pm: [phone]  Personal Pager Weekdays 7am-5pm: [phone]  Weeknights/Weekend/Holiday Coverage: Please Call the  for Contact Information  Follow Up Questions Welcome via [facility] [employer] Teams Messenger

## 2024-08-16 NOTE — DIETITIAN INITIAL EVALUATION ADULT - ADD RECOMMEND
TPN via feasible route:  341g Dextrose, 85g AA, 50g 20% Lipids to provide in total  1999Kcal, 85g protein, GIR of 3.0 based on actual body weight (78kg), 1.3grams protein/kg ideal body weight (61.6kg)  Recommend checking TG before starting TPN and then check TG weekly. Check Magnesium, Phoshorus, potassium daily and POC BG Q6hrs. Trend daily weights. Fluids and electrolytes per MD discretion. Start at 100g Dextrose on Day 1, 200g Dextrose on Day 2, 300g Dextrose on Day 3 and advance to goal of 341g Dextrose on Day 4.   >>>MVI, thiamine in bag  2. Weekly lipid panel while on TPN, next 8/23  >>>hold lipid inj. if TG >400  3. Pain regimen per team   4. RDN will continue to monitor, reassess, and intervene as appropriate.

## 2024-08-16 NOTE — PROGRESS NOTE ADULT - SUBJECTIVE AND OBJECTIVE BOX
GASTROENTEROLOGY PROGRESS NOTE    INTERVAL/SUBJECTIVE:  Patient continues to describe feeling well without overt abdominal pain, nausea, or vomiting. NG tube still bothersome to patient. He questions what the next steps in his care will be.     Allergies    No Known Allergies    Intolerances      MEDICATIONS:  MEDICATIONS  (STANDING):  dextrose 5% + sodium chloride 0.45%. 1000 milliLiter(s) (120 mL/Hr) IV Continuous <Continuous>  heparin   Injectable 5000 Unit(s) SubCutaneous every 8 hours  HYDROmorphone  Injectable 0.2 milliGRAM(s) IV Push every 15 minutes  methylPREDNISolone sodium succinate Injectable 20 milliGRAM(s) IV Push every 8 hours  PPD  5 Tuberculin Unit(s) Injectable 5 Unit(s) IntraDermal once    MEDICATIONS  (PRN):  ondansetron Injectable 4 milliGRAM(s) IV Push every 6 hours PRN Nausea    Vital Signs Last 24 Hrs  T(C): 36.8 (16 Aug 2024 08:35), Max: 37.1 (15 Aug 2024 13:09)  T(F): 98.2 (16 Aug 2024 08:35), Max: 98.7 (15 Aug 2024 13:09)  HR: 64 (16 Aug 2024 08:35) (64 - 75)  BP: 120/77 (16 Aug 2024 08:35) (117/63 - 132/75)  BP(mean): 86 (16 Aug 2024 05:19) (86 - 86)  RR: 17 (16 Aug 2024 08:35) (17 - 18)  SpO2: 95% (16 Aug 2024 08:35) (93% - 95%)    Parameters below as of 16 Aug 2024 08:35  Patient On (Oxygen Delivery Method): room air        08-15 @ 07:01 - 08-16 @ 07:00  --------------------------------------------------------  IN: 2640 mL / OUT: 3930 mL / NET: -1290 mL    08-16 @ 07:01  -  08-16 @ 10:42  --------------------------------------------------------  IN: 0 mL / OUT: 150 mL / NET: -150 mL      General: Well developed, well nourished, in no acute distress, ng tube in place  Eyes: Anicteric sclerae, moist conjunctivae, extraocular motions intact, pupils equal round and reactive to light  HENT: Pink and moist mucous membranes, good dentition, tongue normal in appearance without lesions and has symmetrical movement, no obvious oral lesions noted, pharynx normal without tonsillar swelling or exudates  Neck: Trachea midline, supple, no obvious lymphadenopathy  Chest: Symmetric appearing, non tender to palpation  Cardiovascular: Regular rate and rhythm, no obvious murmur rub or gallop  Respiratory: Normal respiratory effort, clear lung sounds in anterior and posterior posts bilaterally, no obvious rales ronchi or wheeze  Abdomen: Symmetric appearing, no obvious lesions, mildly distended, normal bowel sounds, soft, mild tenderness to palpation, no rebound or guarding  Extremities: Normal range of motion, no clubbing cyanosis or edema  Neurological: Awake alert and oriented to person place time and situation  Skin: Warm and dry, no obvious rash, no jaundice    LABS:                        13.8   9.24  )-----------( 330      ( 16 Aug 2024 07:01 )             42.4     08-16    141  |  103  |  12  ----------------------------<  132<H>  3.9   |  26  |  0.67    Ca    9.4      16 Aug 2024 07:01  Phos  3.3     08-16  Mg     2.2     08-16    TPro  7.3  /  Alb  3.8  /  TBili  0.8  /  DBili  x   /  AST  27  /  ALT  33  /  AlkPhos  41  08-16        RADIOLOGY & ADDITIONAL STUDIES: Reviewed

## 2024-08-17 LAB
ANION GAP SERPL CALC-SCNC: 11 MMOL/L — SIGNIFICANT CHANGE UP (ref 5–17)
BUN SERPL-MCNC: 11 MG/DL — SIGNIFICANT CHANGE UP (ref 7–23)
CALCIUM SERPL-MCNC: 9.2 MG/DL — SIGNIFICANT CHANGE UP (ref 8.4–10.5)
CHLORIDE SERPL-SCNC: 102 MMOL/L — SIGNIFICANT CHANGE UP (ref 96–108)
CO2 SERPL-SCNC: 25 MMOL/L — SIGNIFICANT CHANGE UP (ref 22–31)
CREAT SERPL-MCNC: 0.66 MG/DL — SIGNIFICANT CHANGE UP (ref 0.5–1.3)
CRP SERPL-MCNC: 9.6 MG/L — HIGH (ref 0–4)
EGFR: 116 ML/MIN/1.73M2 — SIGNIFICANT CHANGE UP
GLUCOSE SERPL-MCNC: 128 MG/DL — HIGH (ref 70–99)
HCT VFR BLD CALC: 45.4 % — SIGNIFICANT CHANGE UP (ref 39–50)
HGB BLD-MCNC: 14.9 G/DL — SIGNIFICANT CHANGE UP (ref 13–17)
MAGNESIUM SERPL-MCNC: 2.1 MG/DL — SIGNIFICANT CHANGE UP (ref 1.6–2.6)
MCHC RBC-ENTMCNC: 28 PG — SIGNIFICANT CHANGE UP (ref 27–34)
MCHC RBC-ENTMCNC: 32.8 GM/DL — SIGNIFICANT CHANGE UP (ref 32–36)
MCV RBC AUTO: 85.2 FL — SIGNIFICANT CHANGE UP (ref 80–100)
NRBC # BLD: 0 /100 WBCS — SIGNIFICANT CHANGE UP (ref 0–0)
PHOSPHATE SERPL-MCNC: 3.2 MG/DL — SIGNIFICANT CHANGE UP (ref 2.5–4.5)
PLATELET # BLD AUTO: 338 K/UL — SIGNIFICANT CHANGE UP (ref 150–400)
POTASSIUM SERPL-MCNC: 3.8 MMOL/L — SIGNIFICANT CHANGE UP (ref 3.5–5.3)
POTASSIUM SERPL-SCNC: 3.8 MMOL/L — SIGNIFICANT CHANGE UP (ref 3.5–5.3)
RBC # BLD: 5.33 M/UL — SIGNIFICANT CHANGE UP (ref 4.2–5.8)
RBC # FLD: 13.3 % — SIGNIFICANT CHANGE UP (ref 10.3–14.5)
SODIUM SERPL-SCNC: 138 MMOL/L — SIGNIFICANT CHANGE UP (ref 135–145)
WBC # BLD: 9.8 K/UL — SIGNIFICANT CHANGE UP (ref 3.8–10.5)
WBC # FLD AUTO: 9.8 K/UL — SIGNIFICANT CHANGE UP (ref 3.8–10.5)

## 2024-08-17 PROCEDURE — 99232 SBSQ HOSP IP/OBS MODERATE 35: CPT

## 2024-08-17 RX ORDER — I.V. FAT EMULSION 20 G/100ML
0.8 EMULSION INTRAVENOUS
Qty: 50 | Refills: 0 | Status: DISCONTINUED | OUTPATIENT
Start: 2024-08-17 | End: 2024-08-17

## 2024-08-17 RX ORDER — ELECTROLYTE SOLUTION,INJ
1 VIAL (ML) INTRAVENOUS
Refills: 0 | Status: DISCONTINUED | OUTPATIENT
Start: 2024-08-17 | End: 2024-08-17

## 2024-08-17 RX ORDER — POTASSIUM CHLORIDE 10 MEQ
20 TABLET, EXT RELEASE, PARTICLES/CRYSTALS ORAL ONCE
Refills: 0 | Status: DISCONTINUED | OUTPATIENT
Start: 2024-08-17 | End: 2024-08-17

## 2024-08-17 RX ADMIN — Medication 5000 UNIT(S): at 06:37

## 2024-08-17 RX ADMIN — Medication 120 MILLILITER(S): at 17:52

## 2024-08-17 RX ADMIN — CHLORHEXIDINE GLUCONATE 1 APPLICATION(S): 40 SOLUTION TOPICAL at 05:53

## 2024-08-17 RX ADMIN — Medication 80 MILLILITER(S): at 18:39

## 2024-08-17 RX ADMIN — Medication 20 MILLIGRAM(S): at 22:07

## 2024-08-17 RX ADMIN — Medication 1 EACH: at 17:50

## 2024-08-17 RX ADMIN — Medication 20 MILLIGRAM(S): at 06:37

## 2024-08-17 RX ADMIN — Medication 20 MILLIGRAM(S): at 14:43

## 2024-08-17 RX ADMIN — I.V. FAT EMULSION 20.83 GM/KG/DAY: 20 EMULSION INTRAVENOUS at 17:51

## 2024-08-17 RX ADMIN — Medication 5000 UNIT(S): at 14:44

## 2024-08-17 RX ADMIN — Medication 5000 UNIT(S): at 22:07

## 2024-08-17 NOTE — PROGRESS NOTE ADULT - ASSESSMENT
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 Now s/p dx lap, lysis of adhesions (8/11). POD6 doing well today, will trial clamp of NG tube.     NPO/IVF  PICC/TPN  NGT to LIWS, clamp trial for 3pm  Pain/nausea control PRN  HSQ/SCDs/IS/OOBA  GI Consult - 20 mg Methylprednisolone TID  AM labs

## 2024-08-17 NOTE — PROGRESS NOTE ADULT - SUBJECTIVE AND OBJECTIVE BOX
O/N Events:    Subjective/ROS: Patient seen and examined at bedside.     Denies Fever/Chills, HA, CP, SOB, n/v, changes in bowel/urinary habits.  12pt ROS otherwise negative.    VITALS  Vital Signs Last 24 Hrs  T(C): 36.4 (17 Aug 2024 08:52), Max: 37.1 (16 Aug 2024 14:27)  T(F): 97.6 (17 Aug 2024 08:52), Max: 98.7 (16 Aug 2024 14:27)  HR: 70 (17 Aug 2024 08:52) (70 - 81)  BP: 115/69 (17 Aug 2024 08:52) (115/69 - 150/73)  BP(mean): --  RR: 18 (17 Aug 2024 08:52) (17 - 18)  SpO2: 94% (17 Aug 2024 08:52) (92% - 96%)    Parameters below as of 17 Aug 2024 08:52  Patient On (Oxygen Delivery Method): room air        CAPILLARY BLOOD GLUCOSE          PHYSICAL EXAM  General: NAD  HEENT: NC/AT; PERRL, anicteric sclera; MMM. NGT in place.  Neck: supple  Cardiovascular: +S1/S2; RRR  Respiratory: CTA B/L; no W/R/R  Gastrointestinal: soft, NT/ND; +BSx4. Lap incisions healing well without erythema.  Extremities: WWP; no edema, clubbing or cyanosis  Vascular: 2+ radial, DP/PT pulses B/L  Neurological: AAOx3; no focal deficits    MEDICATIONS  (STANDING):  benzocaine 20% Spray 1 Spray(s) Topical once  chlorhexidine 2% Cloths 1 Application(s) Topical <User Schedule>  dextrose 5% + sodium chloride 0.45%. 1000 milliLiter(s) (120 mL/Hr) IV Continuous <Continuous>  heparin   Injectable 5000 Unit(s) SubCutaneous every 8 hours  HYDROmorphone  Injectable 0.2 milliGRAM(s) IV Push every 15 minutes  methylPREDNISolone sodium succinate Injectable 20 milliGRAM(s) IV Push every 8 hours  Parenteral Nutrition - Adult 1 Each (42 mL/Hr) TPN Continuous <Continuous>    MEDICATIONS  (PRN):  ondansetron Injectable 4 milliGRAM(s) IV Push every 6 hours PRN Nausea  sodium chloride 0.9% lock flush 10 milliLiter(s) IV Push every 1 hour PRN Pre/post blood products, medications, blood draw, and to maintain line patency      No Known Allergies      LABS                        14.9   9.80  )-----------( 338      ( 17 Aug 2024 05:30 )             45.4     08-17    138  |  102  |  11  ----------------------------<  128<H>  3.8   |  25  |  0.66    Ca    9.2      17 Aug 2024 05:30  Phos  3.2     08-17  Mg     2.1     08-17    TPro  7.3  /  Alb  3.8  /  TBili  0.8  /  DBili  x   /  AST  27  /  ALT  33  /  AlkPhos  41  08-16      Urinalysis Basic - ( 17 Aug 2024 05:30 )    Color: x / Appearance: x / SG: x / pH: x  Gluc: 128 mg/dL / Ketone: x  / Bili: x / Urobili: x   Blood: x / Protein: x / Nitrite: x   Leuk Esterase: x / RBC: x / WBC x   Sq Epi: x / Non Sq Epi: x / Bacteria: x              IMAGING/EKG/ETC

## 2024-08-17 NOTE — PROGRESS NOTE ADULT - ATTENDING COMMENTS
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 Patient afebrile, HD normal on presentation without acute laboratory abnormalaties noted. CTAP concerning for closed loop obstruction with two distinct transition points. Now s/p dx lap, lysis of adhesions (8/11). Medicine consulted for indeterminant quantiferon result,   intervally - started on TPN 8/16, CT enterography 8/16-reviewed- with possible partial sbo,     - seen with Dr. Elliott.  still on NGT , clinically he felt better   NGT in place, RRR, good air entry bilaterally  TPN on going, line in right arm clean.  laproscopic scar in abdomen, BS present, soft  no edema noted.  neuro -non focal.  ambulation encouraged    - bowel obstruction, inflammatory vs stricture/fibrosis vs adhesion   - remain bowel rest ( npo) , NGT tube in place, TPN for nutrition  - on steroids as per surgery and GI ( now on methylprednisone 20 mg iv q 8 hourly)   - fu repeat Quantiferon test, fu PPD   - prealbumin 17 on 8/16      Vitamin d insufficiency - 23.7 on 8/16  when allow to take oral would need Vitamin D supplement.   B12- 755 on 8/16- adequate   - daily CRP 7.7 on 8/16--> 9.6 on 8/17   - dvt prophylasix - on heparin sq    Thank you for allowing medicine to participate in the care, will follow.

## 2024-08-17 NOTE — PROGRESS NOTE ADULT - SUBJECTIVE AND OBJECTIVE BOX
INTERVAL HPI/OVERNIGHT EVENTS: NAEO    SUBJECTIVE: Pt seen and examined by chief resident. Pt is doing well, resting comfortably on bed. Pain controlled. NPO. Ambulating out of bed. Reports +fl. No nausea or vomiting. No complaints at this time.    MEDICATIONS  (STANDING):  benzocaine 20% Spray 1 Spray(s) Topical once  chlorhexidine 2% Cloths 1 Application(s) Topical <User Schedule>  dextrose 5% + sodium chloride 0.45%. 1000 milliLiter(s) (80 mL/Hr) IV Continuous <Continuous>  heparin   Injectable 5000 Unit(s) SubCutaneous every 8 hours  HYDROmorphone  Injectable 0.2 milliGRAM(s) IV Push every 15 minutes  lipid, fat emulsion (Fish Oil and Plant Based) 20% Infusion 0.8 Gm/kG/Day (20.83 mL/Hr) IV Continuous <Continuous>  methylPREDNISolone sodium succinate Injectable 20 milliGRAM(s) IV Push every 8 hours  Parenteral Nutrition - Adult 1 Each (42 mL/Hr) TPN Continuous <Continuous>    MEDICATIONS  (PRN):  ondansetron Injectable 4 milliGRAM(s) IV Push every 6 hours PRN Nausea  sodium chloride 0.9% lock flush 10 milliLiter(s) IV Push every 1 hour PRN Pre/post blood products, medications, blood draw, and to maintain line patency      Vital Signs Last 24 Hrs  T(C): 36.9 (17 Aug 2024 21:06), Max: 37 (17 Aug 2024 05:19)  T(F): 98.5 (17 Aug 2024 21:06), Max: 98.6 (17 Aug 2024 05:19)  HR: 78 (17 Aug 2024 21:06) (67 - 78)  BP: 129/79 (17 Aug 2024 21:06) (109/68 - 129/79)  BP(mean): 96 (17 Aug 2024 21:06) (96 - 96)  RR: 18 (17 Aug 2024 21:06) (17 - 18)  SpO2: 96% (17 Aug 2024 21:06) (92% - 96%)    Parameters below as of 17 Aug 2024 21:06  Patient On (Oxygen Delivery Method): room air        PHYSICAL EXAM:      Gen: NAD, resting comfortably in bed   CV: NSR  Pulm: no respiratory distress on RA, CTAB   Abd: soft, mildly distended, NTTP, no rebound or guarding. NGT in place to LIWS, draining thin nonbilious light brown succus  Ext: WWP, no edema                   I&O's Detail    16 Aug 2024 07:01  -  17 Aug 2024 07:00  --------------------------------------------------------  IN:    dextrose 5% + sodium chloride 0.45%: 2640 mL    Fat Emulsion (Fish Oil &amp; Plant Based) 20% Infusion: 291.2 mL    TPN (Total Parenteral Nutrition): 621 mL  Total IN: 3552.2 mL    OUT:    Nasogastric/Oral tube (mL): 500 mL    Oral Fluid: 0 mL    Voided (mL): 1750 mL  Total OUT: 2250 mL    Total NET: 1302.2 mL      17 Aug 2024 07:01  -  18 Aug 2024 01:21  --------------------------------------------------------  IN:    dextrose 5% + sodium chloride 0.45%: 1320 mL    dextrose 5% + sodium chloride 0.45%: 560 mL    Fat Emulsion (Fish Oil &amp; Plant Based) 20% Infusion: 187.2 mL    TPN (Total Parenteral Nutrition): 756 mL  Total IN: 2823.2 mL    OUT:    Nasogastric/Oral tube (mL): 550 mL    Voided (mL): 950 mL  Total OUT: 1500 mL    Total NET: 1323.2 mL          LABS:                        14.9   9.80  )-----------( 338      ( 17 Aug 2024 05:30 )             45.4     08-17    138  |  102  |  11  ----------------------------<  128<H>  3.8   |  25  |  0.66    Ca    9.2      17 Aug 2024 05:30  Phos  3.2     08-17  Mg     2.1     08-17    TPro  7.3  /  Alb  3.8  /  TBili  0.8  /  DBili  x   /  AST  27  /  ALT  33  /  AlkPhos  41  08-16      Urinalysis Basic - ( 17 Aug 2024 05:30 )    Color: x / Appearance: x / SG: x / pH: x  Gluc: 128 mg/dL / Ketone: x  / Bili: x / Urobili: x   Blood: x / Protein: x / Nitrite: x   Leuk Esterase: x / RBC: x / WBC x   Sq Epi: x / Non Sq Epi: x / Bacteria: x        RADIOLOGY & ADDITIONAL STUDIES:

## 2024-08-17 NOTE — PROGRESS NOTE ADULT - ASSESSMENT
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 Patient afebrile, HD normal on presentation without acute laboratory abnormalaties noted. CTAP concerning for closed loop obstruction with two distinct transition points. Now s/p dx lap, lysis of adhesions (8/11). Medicine consulted for indeterminant quantiferon result.    #Crohn's disease  #Indeterminant Quant Result  Patient underwent TB testing per GI in preparation for possible immunosuppressive therapy, currently on methylpred IV 20mg TID  Hep B Ag, Ab, and Core Ab negative  Indeterminant result likely as a result from incorrect collection of specimen, patient has no risk factors for TB, no known exposures.  - Recommend repeat quantiferon TB test  - Recommend also placing PPD under skin now so that if next quant is also indeterminant, can read PPD test in 48 hours as alternative testing for TB exposure  - Rest of care per surgery and GI    Thank you for this consult, medicine will follow.     Case discussed with Dr. Walters

## 2024-08-18 LAB
ANION GAP SERPL CALC-SCNC: 9 MMOL/L — SIGNIFICANT CHANGE UP (ref 5–17)
BUN SERPL-MCNC: 13 MG/DL — SIGNIFICANT CHANGE UP (ref 7–23)
CALCIUM SERPL-MCNC: 9.2 MG/DL — SIGNIFICANT CHANGE UP (ref 8.4–10.5)
CHLORIDE SERPL-SCNC: 103 MMOL/L — SIGNIFICANT CHANGE UP (ref 96–108)
CO2 SERPL-SCNC: 28 MMOL/L — SIGNIFICANT CHANGE UP (ref 22–31)
CREAT SERPL-MCNC: 0.64 MG/DL — SIGNIFICANT CHANGE UP (ref 0.5–1.3)
CRP SERPL-MCNC: 7.3 MG/L — HIGH (ref 0–4)
EGFR: 118 ML/MIN/1.73M2 — SIGNIFICANT CHANGE UP
GLUCOSE SERPL-MCNC: 124 MG/DL — HIGH (ref 70–99)
HCT VFR BLD CALC: 43.1 % — SIGNIFICANT CHANGE UP (ref 39–50)
HGB BLD-MCNC: 14.4 G/DL — SIGNIFICANT CHANGE UP (ref 13–17)
MAGNESIUM SERPL-MCNC: 2.1 MG/DL — SIGNIFICANT CHANGE UP (ref 1.6–2.6)
MCHC RBC-ENTMCNC: 27.8 PG — SIGNIFICANT CHANGE UP (ref 27–34)
MCHC RBC-ENTMCNC: 33.4 GM/DL — SIGNIFICANT CHANGE UP (ref 32–36)
MCV RBC AUTO: 83.2 FL — SIGNIFICANT CHANGE UP (ref 80–100)
NRBC # BLD: 0 /100 WBCS — SIGNIFICANT CHANGE UP (ref 0–0)
PHOSPHATE SERPL-MCNC: 3.6 MG/DL — SIGNIFICANT CHANGE UP (ref 2.5–4.5)
PLATELET # BLD AUTO: 268 K/UL — SIGNIFICANT CHANGE UP (ref 150–400)
POTASSIUM SERPL-MCNC: 3.4 MMOL/L — LOW (ref 3.5–5.3)
POTASSIUM SERPL-SCNC: 3.4 MMOL/L — LOW (ref 3.5–5.3)
RBC # BLD: 5.18 M/UL — SIGNIFICANT CHANGE UP (ref 4.2–5.8)
RBC # FLD: 13.6 % — SIGNIFICANT CHANGE UP (ref 10.3–14.5)
SODIUM SERPL-SCNC: 140 MMOL/L — SIGNIFICANT CHANGE UP (ref 135–145)
WBC # BLD: 8.93 K/UL — SIGNIFICANT CHANGE UP (ref 3.8–10.5)
WBC # FLD AUTO: 8.93 K/UL — SIGNIFICANT CHANGE UP (ref 3.8–10.5)

## 2024-08-18 PROCEDURE — 99232 SBSQ HOSP IP/OBS MODERATE 35: CPT

## 2024-08-18 RX ORDER — I.V. FAT EMULSION 20 G/100ML
0.7 EMULSION INTRAVENOUS
Qty: 50 | Refills: 0 | Status: DISCONTINUED | OUTPATIENT
Start: 2024-08-18 | End: 2024-08-18

## 2024-08-18 RX ORDER — ELECTROLYTE SOLUTION,INJ
1 VIAL (ML) INTRAVENOUS
Refills: 0 | Status: DISCONTINUED | OUTPATIENT
Start: 2024-08-18 | End: 2024-08-18

## 2024-08-18 RX ORDER — POTASSIUM CHLORIDE 10 MEQ
10 TABLET, EXT RELEASE, PARTICLES/CRYSTALS ORAL
Refills: 0 | Status: COMPLETED | OUTPATIENT
Start: 2024-08-18 | End: 2024-08-18

## 2024-08-18 RX ADMIN — Medication 20 MILLIGRAM(S): at 15:12

## 2024-08-18 RX ADMIN — Medication 100 MILLIEQUIVALENT(S): at 17:30

## 2024-08-18 RX ADMIN — CHLORHEXIDINE GLUCONATE 1 APPLICATION(S): 40 SOLUTION TOPICAL at 05:58

## 2024-08-18 RX ADMIN — Medication 1 EACH: at 18:03

## 2024-08-18 RX ADMIN — Medication 5000 UNIT(S): at 21:48

## 2024-08-18 RX ADMIN — Medication 100 MILLIEQUIVALENT(S): at 11:43

## 2024-08-18 RX ADMIN — Medication 100 MILLIEQUIVALENT(S): at 13:51

## 2024-08-18 RX ADMIN — I.V. FAT EMULSION 20.83 GM/KG/DAY: 20 EMULSION INTRAVENOUS at 18:04

## 2024-08-18 RX ADMIN — Medication 5000 UNIT(S): at 15:12

## 2024-08-18 RX ADMIN — Medication 5000 UNIT(S): at 05:58

## 2024-08-18 RX ADMIN — Medication 20 MILLIGRAM(S): at 21:48

## 2024-08-18 RX ADMIN — Medication 20 MILLIGRAM(S): at 05:58

## 2024-08-18 NOTE — PROGRESS NOTE ADULT - SUBJECTIVE AND OBJECTIVE BOX
HX: POD 7 s/p dx lap, lysis of adhesions     SUBJECTIVE: Patient seen and examined bedside by chief resident. Patient reports doing well, tolerating diet, no nausea, vomiting, CP or SOB.    heparin   Injectable 5000 Unit(s) SubCutaneous every 8 hours    MEDICATIONS  (PRN):  ondansetron Injectable 4 milliGRAM(s) IV Push every 6 hours PRN Nausea  sodium chloride 0.9% lock flush 10 milliLiter(s) IV Push every 1 hour PRN Pre/post blood products, medications, blood draw, and to maintain line patency      I&O's Detail    17 Aug 2024 07:01  -  18 Aug 2024 07:00  --------------------------------------------------------  IN:    dextrose 5% + sodium chloride 0.45%: 1320 mL    dextrose 5% + sodium chloride 0.45%: 880 mL    Fat Emulsion (Fish Oil &amp; Plant Based) 20% Infusion: 270.4 mL    TPN (Total Parenteral Nutrition): 1008 mL  Total IN: 3478.4 mL    OUT:    Nasogastric/Oral tube (mL): 550 mL    Voided (mL): 2200 mL  Total OUT: 2750 mL    Total NET: 728.4 mL          Vital Signs Last 24 Hrs  T(C): 36.3 (18 Aug 2024 08:15), Max: 36.9 (17 Aug 2024 13:01)  T(F): 97.4 (18 Aug 2024 08:15), Max: 98.5 (17 Aug 2024 13:01)  HR: 62 (18 Aug 2024 08:15) (59 - 78)  BP: 129/76 (18 Aug 2024 08:15) (109/68 - 129/79)  BP(mean): 84 (18 Aug 2024 05:12) (84 - 96)  RR: 17 (18 Aug 2024 08:15) (17 - 18)  SpO2: 96% (18 Aug 2024 08:15) (94% - 96%)    Parameters below as of 18 Aug 2024 08:15  Patient On (Oxygen Delivery Method): room air        PHYSICAL EXAM:   Gen: NAD, resting comfortably   CV: NSR  Pulm: no respiratory distress on RA, CTAB   Abd: soft, ND, NTTP, no rebound or guarding   Ext: WWP, no edema   Neuro: motor/sensory grossly intact     LABS:                        14.4   8.93  )-----------( 268      ( 18 Aug 2024 05:30 )             43.1     08-18    140  |  103  |  13  ----------------------------<  124<H>  3.4<L>   |  28  |  0.64    Ca    9.2      18 Aug 2024 05:30  Phos  3.6     08-18  Mg     2.1     08-18          CAPILLARY BLOOD GLUCOSE            Urinalysis Basic - ( 18 Aug 2024 05:30 )    Color: x / Appearance: x / SG: x / pH: x  Gluc: 124 mg/dL / Ketone: x  / Bili: x / Urobili: x   Blood: x / Protein: x / Nitrite: x   Leuk Esterase: x / RBC: x / WBC x   Sq Epi: x / Non Sq Epi: x / Bacteria: x

## 2024-08-18 NOTE — PROGRESS NOTE ADULT - ATTENDING COMMENTS
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 Patient afebrile, HD normal on presentation without acute laboratory abnormalaties noted. CTAP concerning for closed loop obstruction with two distinct transition points. Now s/p dx lap, lysis of adhesions (8/11). Medicine consulted for indeterminant quantiferon result,   intervally - started on TPN 8/16, CT enterography 8/16-reviewed- with possible partial sbo,       - bowel obstruction, inflammatory vs stricture/fibrosis vs adhesion -improvement seen in repeat CT  - interval removal of NGT -   - CLD - diet as per surgery.   - on steroids as per surgery and GI ( now on methylprednisone 20 mg iv q 8 hourly)   - fu repeat Quantiferon test, fu PPD   - prealbumin 17 on 8/16      Vitamin d insufficiency - 23.7 on 8/16  when allow to take oral would need Vitamin D supplement.   B12- 755 on 8/16- adequate   - daily CRP 7.7 on 8/16--> 9.6 on 8/17 --> 7. on 8/18-   - dvt prophylasix - on heparin sq    Thank you for allowing medicine to participate in the care, will follow.

## 2024-08-18 NOTE — PROGRESS NOTE ADULT - ASSESSMENT
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 Now s/p dx lap, lysis of adhesions (8/11). POD7 doing well today, tolerating diet.    NPO/IVF  PICC/TPN  Pain/nausea control PRN  HSQ/SCDs/IS/OOBA  GI Consult - 20 mg Methylprednisolone TID  AM labs      Plan discussed with attending and chief resident.   ____________________________________________________  Kylah Garcia - Resident   Surgery

## 2024-08-18 NOTE — PROGRESS NOTE ADULT - SUBJECTIVE AND OBJECTIVE BOX
RUPINDER VEGA , [id number],  [facility] [id number]    Time of encounter : 11 am  on CLD   happy that NGT is out.  on TPN - felt stronger   having BM and somewhat passing gas- no abdominal pain.     T(C): 36.9 (08-18-24 @ 16:38), Max: 37 (08-18-24 @ 12:04)  HR: 65 (08-18-24 @ 16:38) (59 - 78)  BP: 125/79 (08-18-24 @ 16:38) (110/70 - 129/79)  RR: 17 (08-18-24 @ 16:38) (17 - 18)  SpO2: 97% (08-18-24 @ 16:38) (96% - 98%)    benzocaine 20% Spray 1 Spray(s) Topical once  chlorhexidine 2% Cloths 1 Application(s) Topical <User Schedule>  dextrose 5% + sodium chloride 0.45%. 1000 milliLiter(s) IV Continuous <Continuous>  heparin   Injectable 5000 Unit(s) SubCutaneous every 8 hours  lipid, fat emulsion (Fish Oil and Plant Based) 20% Infusion 0.7 Gm/kG/Day IV Continuous <Continuous>  methylPREDNISolone sodium succinate Injectable 20 milliGRAM(s) IV Push every 8 hours  ondansetron Injectable 4 milliGRAM(s) IV Push every 6 hours PRN  Parenteral Nutrition - Adult 1 Each TPN Continuous <Continuous>  Parenteral Nutrition - Adult 1 Each TPN Continuous <Continuous>  potassium chloride  10 mEq/100 mL IVPB 10 milliEquivalent(s) IV Intermittent every 1 hour  sodium chloride 0.9% lock flush 10 milliLiter(s) IV Push every 1 hour PRN      Physical Exam :    General exam :  well built, not in distress, saturating well on room air.  CVS : RRR no murmur, JVP not elevated  Lungs : good air entry bilaterally   Abdomen : BS present, soft, not tender, not distended.  Extremities: no edema, no tenderness.  Neuro : AAO x 3 non focal.  Skin : warm and dry.   :  no beck.      CBC Full  -  ( 18 Aug 2024 05:30 )  WBC Count : 8.93 K/uL  RBC Count : 5.18 M/uL  Hemoglobin : 14.4 g/dL  Hematocrit : 43.1 %  Platelet Count - Automated : 268 K/uL  Mean Cell Volume : 83.2 fl  Mean Cell Hemoglobin : 27.8 pg  Mean Cell Hemoglobin Concentration : 33.4 gm/dL  Auto Neutrophil # : x  Auto Lymphocyte # : x  Auto Monocyte # : x  Auto Eosinophil # : x  Auto Basophil # : x  Auto Neutrophil % : x  Auto Lymphocyte % : x  Auto Monocyte % : x  Auto Eosinophil % : x  Auto Basophil % : x        08-18    140  |  103  |  13  ----------------------------<  124<H>  3.4<L>   |  28  |  0.64    Ca    9.2      18 Aug 2024 05:30  Phos  3.6     08-18  Mg     2.1     08-18      Daily     Daily   CAPILLARY BLOOD GLUCOSE          Urinalysis Basic - ( 18 Aug 2024 05:30 )    Color: x / Appearance: x / SG: x / pH: x  Gluc: 124 mg/dL / Ketone: x  / Bili: x / Urobili: x   Blood: x / Protein: x / Nitrite: x   Leuk Esterase: x / RBC: x / WBC x   Sq Epi: x / Non Sq Epi: x / Bacteria: x

## 2024-08-19 ENCOUNTER — TRANSCRIPTION ENCOUNTER (OUTPATIENT)
Age: 49
End: 2024-08-19

## 2024-08-19 LAB
ANION GAP SERPL CALC-SCNC: 10 MMOL/L — SIGNIFICANT CHANGE UP (ref 5–17)
BUN SERPL-MCNC: 12 MG/DL — SIGNIFICANT CHANGE UP (ref 7–23)
CALCIUM SERPL-MCNC: 9.1 MG/DL — SIGNIFICANT CHANGE UP (ref 8.4–10.5)
CHLORIDE SERPL-SCNC: 106 MMOL/L — SIGNIFICANT CHANGE UP (ref 96–108)
CO2 SERPL-SCNC: 25 MMOL/L — SIGNIFICANT CHANGE UP (ref 22–31)
CREAT SERPL-MCNC: 0.59 MG/DL — SIGNIFICANT CHANGE UP (ref 0.5–1.3)
CRP SERPL-MCNC: 3.4 MG/L — SIGNIFICANT CHANGE UP (ref 0–4)
EGFR: 120 ML/MIN/1.73M2 — SIGNIFICANT CHANGE UP
GLUCOSE SERPL-MCNC: 121 MG/DL — HIGH (ref 70–99)
HCT VFR BLD CALC: 41.6 % — SIGNIFICANT CHANGE UP (ref 39–50)
HGB BLD-MCNC: 13.8 G/DL — SIGNIFICANT CHANGE UP (ref 13–17)
MAGNESIUM SERPL-MCNC: 2 MG/DL — SIGNIFICANT CHANGE UP (ref 1.6–2.6)
MCHC RBC-ENTMCNC: 28.3 PG — SIGNIFICANT CHANGE UP (ref 27–34)
MCHC RBC-ENTMCNC: 33.2 GM/DL — SIGNIFICANT CHANGE UP (ref 32–36)
MCV RBC AUTO: 85.4 FL — SIGNIFICANT CHANGE UP (ref 80–100)
NRBC # BLD: 0 /100 WBCS — SIGNIFICANT CHANGE UP (ref 0–0)
PHOSPHATE SERPL-MCNC: 3.4 MG/DL — SIGNIFICANT CHANGE UP (ref 2.5–4.5)
PLATELET # BLD AUTO: 300 K/UL — SIGNIFICANT CHANGE UP (ref 150–400)
POTASSIUM SERPL-MCNC: 3.8 MMOL/L — SIGNIFICANT CHANGE UP (ref 3.5–5.3)
POTASSIUM SERPL-SCNC: 3.8 MMOL/L — SIGNIFICANT CHANGE UP (ref 3.5–5.3)
RBC # BLD: 4.87 M/UL — SIGNIFICANT CHANGE UP (ref 4.2–5.8)
RBC # FLD: 13.6 % — SIGNIFICANT CHANGE UP (ref 10.3–14.5)
SELENIUM SERPL-MCNC: 139 UG/L — SIGNIFICANT CHANGE UP (ref 93–198)
SODIUM SERPL-SCNC: 141 MMOL/L — SIGNIFICANT CHANGE UP (ref 135–145)
VIT A SERPL-MCNC: 27.6 UG/DL — SIGNIFICANT CHANGE UP (ref 20.1–62)
WBC # BLD: 9.89 K/UL — SIGNIFICANT CHANGE UP (ref 3.8–10.5)
WBC # FLD AUTO: 9.89 K/UL — SIGNIFICANT CHANGE UP (ref 3.8–10.5)
ZINC SERPL-MCNC: 70 UG/DL — SIGNIFICANT CHANGE UP (ref 44–115)

## 2024-08-19 PROCEDURE — 99232 SBSQ HOSP IP/OBS MODERATE 35: CPT | Mod: GC

## 2024-08-19 PROCEDURE — 99233 SBSQ HOSP IP/OBS HIGH 50: CPT

## 2024-08-19 RX ORDER — POLYETHYLENE GLYCOL 3350, SODIUM SULFATE ANHYDROUS, SODIUM BICARBONATE, SODIUM CHLORIDE, POTASSIUM CHLORIDE 236; 22.74; 6.74; 5.86; 2.97 G/4L; G/4L; G/4L; G/4L; G/4L
4000 POWDER, FOR SOLUTION ORAL ONCE
Refills: 0 | Status: COMPLETED | OUTPATIENT
Start: 2024-08-19 | End: 2024-08-19

## 2024-08-19 RX ORDER — I.V. FAT EMULSION 20 G/100ML
0.64 EMULSION INTRAVENOUS
Qty: 50 | Refills: 0 | Status: DISCONTINUED | OUTPATIENT
Start: 2024-08-19 | End: 2024-08-19

## 2024-08-19 RX ORDER — ELECTROLYTE SOLUTION,INJ
1 VIAL (ML) INTRAVENOUS
Refills: 0 | Status: DISCONTINUED | OUTPATIENT
Start: 2024-08-19 | End: 2024-08-19

## 2024-08-19 RX ORDER — POTASSIUM CHLORIDE 10 MEQ
20 TABLET, EXT RELEASE, PARTICLES/CRYSTALS ORAL ONCE
Refills: 0 | Status: COMPLETED | OUTPATIENT
Start: 2024-08-19 | End: 2024-08-19

## 2024-08-19 RX ADMIN — Medication 5000 UNIT(S): at 06:00

## 2024-08-19 RX ADMIN — Medication 5000 UNIT(S): at 22:11

## 2024-08-19 RX ADMIN — Medication 5000 UNIT(S): at 14:20

## 2024-08-19 RX ADMIN — Medication 20 MILLIGRAM(S): at 06:00

## 2024-08-19 RX ADMIN — Medication 20 MILLIGRAM(S): at 22:12

## 2024-08-19 RX ADMIN — Medication 1 EACH: at 17:58

## 2024-08-19 RX ADMIN — Medication 80 MILLILITER(S): at 14:27

## 2024-08-19 RX ADMIN — I.V. FAT EMULSION 20.8 GM/KG/DAY: 20 EMULSION INTRAVENOUS at 17:58

## 2024-08-19 RX ADMIN — POLYETHYLENE GLYCOL 3350, SODIUM SULFATE ANHYDROUS, SODIUM BICARBONATE, SODIUM CHLORIDE, POTASSIUM CHLORIDE 4000 MILLILITER(S): 236; 22.74; 6.74; 5.86; 2.97 POWDER, FOR SOLUTION ORAL at 12:02

## 2024-08-19 RX ADMIN — CHLORHEXIDINE GLUCONATE 1 APPLICATION(S): 40 SOLUTION TOPICAL at 06:04

## 2024-08-19 RX ADMIN — Medication 20 MILLIEQUIVALENT(S): at 08:34

## 2024-08-19 RX ADMIN — Medication 20 MILLIGRAM(S): at 14:20

## 2024-08-19 RX ADMIN — Medication 80 MILLILITER(S): at 01:32

## 2024-08-19 NOTE — PROGRESS NOTE ADULT - SUBJECTIVE AND OBJECTIVE BOX
GASTROENTEROLOGY PROGRESS NOTE    INTERVAL/SUBJECTIVE:  NG tube removed over the weekend. Patient currently on clear liquid diet. Denies nausea and vomiting. States that he is passing some gas and has few liquid bowel movements over the weekend.     Allergies    No Known Allergies    Intolerances      MEDICATIONS:  MEDICATIONS  (STANDING):  benzocaine 20% Spray 1 Spray(s) Topical once  chlorhexidine 2% Cloths 1 Application(s) Topical <User Schedule>  dextrose 5% + sodium chloride 0.45%. 1000 milliLiter(s) (80 mL/Hr) IV Continuous <Continuous>  heparin   Injectable 5000 Unit(s) SubCutaneous every 8 hours  lipid, fat emulsion (Fish Oil and Plant Based) 20% Infusion 0.7 Gm/kG/Day (20.83 mL/Hr) IV Continuous <Continuous>  methylPREDNISolone sodium succinate Injectable 20 milliGRAM(s) IV Push every 8 hours  Parenteral Nutrition - Adult 1 Each (63 mL/Hr) TPN Continuous <Continuous>  potassium chloride   Powder 20 milliEquivalent(s) Oral once    MEDICATIONS  (PRN):  ondansetron Injectable 4 milliGRAM(s) IV Push every 6 hours PRN Nausea  sodium chloride 0.9% lock flush 10 milliLiter(s) IV Push every 1 hour PRN Pre/post blood products, medications, blood draw, and to maintain line patency    Vital Signs Last 24 Hrs  T(C): 36.3 (19 Aug 2024 05:46), Max: 37 (18 Aug 2024 12:04)  T(F): 97.3 (19 Aug 2024 05:46), Max: 98.6 (18 Aug 2024 12:04)  HR: 63 (19 Aug 2024 05:46) (58 - 76)  BP: 133/84 (19 Aug 2024 05:46) (95/60 - 133/84)  BP(mean): --  RR: 18 (19 Aug 2024 05:46) (17 - 18)  SpO2: 97% (19 Aug 2024 05:46) (95% - 98%)    Parameters below as of 19 Aug 2024 05:46  Patient On (Oxygen Delivery Method): room air        08-18 @ 07:01  -  08-19 @ 07:00  --------------------------------------------------------  IN: 3450.4 mL / OUT: 1900 mL / NET: 1550.4 mL      General: Well developed, well nourished, in no acute distress, praying  Eyes: Anicteric sclerae, moist conjunctivae, extraocular motions intact, pupils equal round and reactive to light  HENT: Pink and moist mucous membranes, good dentition, tongue normal in appearance without lesions and has symmetrical movement, no obvious oral lesions noted, pharynx normal without tonsillar swelling or exudates  Neck: Trachea midline, supple, no obvious lymphadenopathy  Chest: Symmetric appearing, non tender to palpation  Cardiovascular: Regular rate and rhythm, no obvious murmur rub or gallop  Respiratory: Normal respiratory effort, clear lung sounds in anterior and posterior posts bilaterally, no obvious rales ronchi or wheeze  Abdomen: Symmetric appearing, no obvious lesions, mildly distended, normal bowel sounds, soft, mild tenderness to palpation, no rebound or guarding  Extremities: Normal range of motion, no clubbing cyanosis or edema  Neurological: Awake alert and oriented to person place time and situation  Skin: Warm and dry, no obvious rash, no jaundice    LABS:                        13.8   9.89  )-----------( 300      ( 19 Aug 2024 07:21 )             41.6     08-19    141  |  106  |  12  ----------------------------<  121<H>  3.8   |  25  |  0.59    Ca    9.1      19 Aug 2024 07:21  Phos  3.4     08-19  Mg     2.0     08-19          RADIOLOGY & ADDITIONAL STUDIES: Reviewed

## 2024-08-19 NOTE — PROGRESS NOTE ADULT - SUBJECTIVE AND OBJECTIVE BOX
O/N Events:    Subjective/ROS: Patient seen and examined at bedside.     Denies Fever/Chills, HA, CP, SOB, n/v, changes in bowel/urinary habits.  12pt ROS otherwise negative.    VITALS  Vital Signs Last 24 Hrs  T(C): 36.3 (19 Aug 2024 13:26), Max: 36.9 (18 Aug 2024 16:38)  T(F): 97.4 (19 Aug 2024 13:26), Max: 98.4 (18 Aug 2024 16:38)  HR: 65 (19 Aug 2024 13:26) (58 - 65)  BP: 123/79 (19 Aug 2024 13:26) (95/60 - 133/84)  BP(mean): --  RR: 18 (19 Aug 2024 13:26) (17 - 18)  SpO2: 97% (19 Aug 2024 13:26) (95% - 97%)    Parameters below as of 19 Aug 2024 13:26  Patient On (Oxygen Delivery Method): room air        CAPILLARY BLOOD GLUCOSE          PHYSICAL EXAM  General: NAD  Head: NC/AT; MMM; PERRL; EOMI;  Neck: Supple; no JVD  Respiratory: CTAB; no wheezes/rales/rhonchi  Cardiovascular: Regular rhythm/rate; S1/S2+, no murmurs, rubs gallops   Gastrointestinal: Soft; NTND; bowel sounds normal and present  Extremities: WWP; no edema/cyanosis  Neurological: A&Ox3, CNII-XII grossly intact; no obvious focal deficits    MEDICATIONS  (STANDING):  chlorhexidine 2% Cloths 1 Application(s) Topical <User Schedule>  dextrose 5% + sodium chloride 0.45%. 1000 milliLiter(s) (80 mL/Hr) IV Continuous <Continuous>  heparin   Injectable 5000 Unit(s) SubCutaneous every 8 hours  lipid, fat emulsion (Fish Oil and Plant Based) 20% Infusion 0.641 Gm/kG/Day (20.8 mL/Hr) IV Continuous <Continuous>  methylPREDNISolone sodium succinate Injectable 20 milliGRAM(s) IV Push every 8 hours  Parenteral Nutrition - Adult 1 Each (63 mL/Hr) TPN Continuous <Continuous>  Parenteral Nutrition - Adult 1 Each (63 mL/Hr) TPN Continuous <Continuous>    MEDICATIONS  (PRN):  ondansetron Injectable 4 milliGRAM(s) IV Push every 6 hours PRN Nausea  sodium chloride 0.9% lock flush 10 milliLiter(s) IV Push every 1 hour PRN Pre/post blood products, medications, blood draw, and to maintain line patency      No Known Allergies      LABS                        13.8   9.89  )-----------( 300      ( 19 Aug 2024 07:21 )             41.6     08-19    141  |  106  |  12  ----------------------------<  121<H>  3.8   |  25  |  0.59    Ca    9.1      19 Aug 2024 07:21  Phos  3.4     08-19  Mg     2.0     08-19        Urinalysis Basic - ( 19 Aug 2024 07:21 )    Color: x / Appearance: x / SG: x / pH: x  Gluc: 121 mg/dL / Ketone: x  / Bili: x / Urobili: x   Blood: x / Protein: x / Nitrite: x   Leuk Esterase: x / RBC: x / WBC x   Sq Epi: x / Non Sq Epi: x / Bacteria: x              IMAGING/EKG/ETC   O/N Events: YOKO    Subjective/ROS: Patient seen and examined at bedside. Patient states he is feeling well today since removal of NGT.     Denies Fever/Chills, HA, CP, SOB, n/v, changes in bowel/urinary habits.  12pt ROS otherwise negative.    VITALS  Vital Signs Last 24 Hrs  T(C): 36.3 (19 Aug 2024 13:26), Max: 36.9 (18 Aug 2024 16:38)  T(F): 97.4 (19 Aug 2024 13:26), Max: 98.4 (18 Aug 2024 16:38)  HR: 65 (19 Aug 2024 13:26) (58 - 65)  BP: 123/79 (19 Aug 2024 13:26) (95/60 - 133/84)  BP(mean): --  RR: 18 (19 Aug 2024 13:26) (17 - 18)  SpO2: 97% (19 Aug 2024 13:26) (95% - 97%)    Parameters below as of 19 Aug 2024 13:26  Patient On (Oxygen Delivery Method): room air        CAPILLARY BLOOD GLUCOSE          PHYSICAL EXAM  General: NAD  HEENT: NC/AT; PERRL, anicteric sclera; MMM.  Neck: supple  Cardiovascular: +S1/S2; RRR  Respiratory: CTA B/L; no W/R/R  Gastrointestinal: soft, NT/ND; +BSx4. Lap incisions healing well without erythema.  Extremities: WWP; no edema, clubbing or cyanosis  Vascular: 2+ radial, DP/PT pulses B/L  Neurological: AAOx3; no focal deficits    MEDICATIONS  (STANDING):  chlorhexidine 2% Cloths 1 Application(s) Topical <User Schedule>  dextrose 5% + sodium chloride 0.45%. 1000 milliLiter(s) (80 mL/Hr) IV Continuous <Continuous>  heparin   Injectable 5000 Unit(s) SubCutaneous every 8 hours  lipid, fat emulsion (Fish Oil and Plant Based) 20% Infusion 0.641 Gm/kG/Day (20.8 mL/Hr) IV Continuous <Continuous>  methylPREDNISolone sodium succinate Injectable 20 milliGRAM(s) IV Push every 8 hours  Parenteral Nutrition - Adult 1 Each (63 mL/Hr) TPN Continuous <Continuous>  Parenteral Nutrition - Adult 1 Each (63 mL/Hr) TPN Continuous <Continuous>    MEDICATIONS  (PRN):  ondansetron Injectable 4 milliGRAM(s) IV Push every 6 hours PRN Nausea  sodium chloride 0.9% lock flush 10 milliLiter(s) IV Push every 1 hour PRN Pre/post blood products, medications, blood draw, and to maintain line patency      No Known Allergies      LABS                        13.8   9.89  )-----------( 300      ( 19 Aug 2024 07:21 )             41.6     08-19    141  |  106  |  12  ----------------------------<  121<H>  3.8   |  25  |  0.59    Ca    9.1      19 Aug 2024 07:21  Phos  3.4     08-19  Mg     2.0     08-19        Urinalysis Basic - ( 19 Aug 2024 07:21 )    Color: x / Appearance: x / SG: x / pH: x  Gluc: 121 mg/dL / Ketone: x  / Bili: x / Urobili: x   Blood: x / Protein: x / Nitrite: x   Leuk Esterase: x / RBC: x / WBC x   Sq Epi: x / Non Sq Epi: x / Bacteria: x              IMAGING/EKG/ETC

## 2024-08-19 NOTE — PROGRESS NOTE ADULT - ATTENDING COMMENTS
49M with PMHx of Crohn's disease (diagnosed in 2021, not currently on medications) and PSHx of SBR of terminal ileum (10 years ago) who presents to [facility] for abdominal pain. Patient afebrile, HD normal on presentation without acute laboratory abnormalaties noted. CTAP concerning for closed loop obstruction with two distinct transition points. Now s/p dx lap, lysis of adhesions (8/11). Medicine consulted for indeterminant quantiferon result,   intervally - started on TPN 8/16, CT enterography 8/16-reviewed- with possible partial sbo,     pt seen with Dr. Elliott  seen by GI earlier  tolerating oral, on TPN.  lung exam clear. abdomen soft. no edema noted.      - bowel obstruction, inflammatory vs stricture/fibrosis vs adhesion -improvement seen in repeat CT  - interval removal of NGT -   - CLD - diet as per surgery.   - on steroids as per surgery and GI ( now on methylprednisone 20 mg iv q 8 hourly)   - CRP normal now.- daily CRP 7.7 on 8/16--> 9.6 on 8/17 --> 7. on 8/18-    - as dw GI -to try for bowel prep and colonoscopy -tomorrow.   - fu repeat Quantiferon test, fu PPD -negative.   - prealbumin 17 on 8/16    Vitamin d insufficiency - 23.7 on 8/16  when allow to take oral would need Vitamin D supplement.   B12- 755 on 8/16- adequate     - dvt prophylasix - on heparin sq    Thank you for allowing medicine to participate in the care, will follow. dw GI.

## 2024-08-19 NOTE — PROGRESS NOTE ADULT - ATTENDING COMMENTS
Patient seen, examined, and discussed with Dr. Harkins. Agree with above. 49M with a h/o stricturing ileocolonic Crohn's disease (diagnosed about 2 years prior to this presentation, previous exposure to ADA, ileocolic resection with anastomosis about 10 years age) who gastroenterology has been consulted for bowel obstruction. Original concern at time of presentation was for closed loop obstruction, underwent ex-lap and was not found to have signs of closed loop obstruction. Underwent lysis of adhesions and placed on hospital floor. Underwent colonoscopy 8/14 which identified no inflammation in rectum, sigmoid, and descending colon with solid stool in transverse, preventing evaluation of anastomotic stricture. Repeated CTE after NG tube decompression identified persistently dilated loops of small bowel proximal to the ileocolic anastomosis with associated mild neoterminal ileal thickening; additional probable site of disease just proximal to the anastomosis in the right lower abdomen. Findings suggestive of persistent partial small bowel obstruction. Case d/w Dr. Cloud. Plan to give bowel prep today and if patient tolerates and is able to clean out, plan for colonoscopy tomorrow.     Vasu Magallanes MD  Gastroenterology

## 2024-08-19 NOTE — PROGRESS NOTE ADULT - SUBJECTIVE AND OBJECTIVE BOX
SUBJECTIVE: Patient seen and examined bedside by chief resident. Patient reports doing well, tolerating clear liquid diet, passing gas, and having small bowel movements. Denies pain, nausea, vomiting.    heparin   Injectable 5000 Unit(s) SubCutaneous every 8 hours    MEDICATIONS  (PRN):  ondansetron Injectable 4 milliGRAM(s) IV Push every 6 hours PRN Nausea  sodium chloride 0.9% lock flush 10 milliLiter(s) IV Push every 1 hour PRN Pre/post blood products, medications, blood draw, and to maintain line patency      I&O's Detail    18 Aug 2024 07:01  -  19 Aug 2024 07:00  --------------------------------------------------------  IN:    dextrose 5% + sodium chloride 0.45%: 1920 mL    Fat Emulsion (Fish Oil &amp; Plant Based) 20% Infusion: 270.4 mL    TPN (Total Parenteral Nutrition): 1260 mL  Total IN: 3450.4 mL    OUT:    Voided (mL): 1900 mL  Total OUT: 1900 mL    Total NET: 1550.4 mL          Vital Signs Last 24 Hrs  T(C): 36.3 (19 Aug 2024 05:46), Max: 37 (18 Aug 2024 12:04)  T(F): 97.3 (19 Aug 2024 05:46), Max: 98.6 (18 Aug 2024 12:04)  HR: 63 (19 Aug 2024 05:46) (58 - 76)  BP: 133/84 (19 Aug 2024 05:46) (95/60 - 133/84)  BP(mean): --  RR: 18 (19 Aug 2024 05:46) (17 - 18)  SpO2: 97% (19 Aug 2024 05:46) (95% - 98%)    Parameters below as of 19 Aug 2024 05:46  Patient On (Oxygen Delivery Method): room air        PHYSICAL EXAM:   Gen: NAD, resting comfortably   CV: NSR  Pulm: no respiratory distress on RA, CTAB   Abd: soft, ND, NTTP, no rebound or guarding, incisions c/d/i   Ext: WWP, no edema   Neuro: motor/sensory grossly intact     LABS:                        14.4   8.93  )-----------( 268      ( 18 Aug 2024 05:30 )             43.1     08-18    140  |  103  |  13  ----------------------------<  124<H>  3.4<L>   |  28  |  0.64    Ca    9.2      18 Aug 2024 05:30  Phos  3.6     08-18  Mg     2.1     08-18          CAPILLARY BLOOD GLUCOSE            Urinalysis Basic - ( 18 Aug 2024 05:30 )    Color: x / Appearance: x / SG: x / pH: x  Gluc: 124 mg/dL / Ketone: x  / Bili: x / Urobili: x   Blood: x / Protein: x / Nitrite: x   Leuk Esterase: x / RBC: x / WBC x   Sq Epi: x / Non Sq Epi: x / Bacteria: x

## 2024-08-19 NOTE — CHART NOTE - NSCHARTNOTEFT_GEN_A_CORE
Patient denies any symptoms such as cough, weight loss, night sweats or fever. No known recent exposure to tuberculosis.    On 8/11/24, Quantiferon TB test was indeterminate. As a follow up, a TB PPD skin test was administered on 8/16/2024 on right arm. Site examined after 48 hours.    PPD skin test result: Negative. No induration noted at the site of injection.    No further action required at this time.

## 2024-08-19 NOTE — PROGRESS NOTE ADULT - ATTENDING COMMENTS
CRS Attending  Seen on am rounds  Feels well since NGT removed over weekend and tolerating clear liquids. Denies abdominal pain or cramping.  Passing flatus and having BM  Abdomen soft, nondistended, nontender  Continue steroids  F/u GI for colonoscopy to evaluate for anastomotic stricture  All questions answered

## 2024-08-19 NOTE — PROGRESS NOTE ADULT - ASSESSMENT
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 Now s/p dx lap, lysis of adhesions (8/11). POD8 doing well today, tolerating diet.    NPO/IVF  PICC/TPN  Pain/nausea control PRN  HSQ/SCDs/IS/OOBA  GI Consult - 20 mg Methylprednisolone TID  AM labs      Plan discussed with attending and chief resident.   ____________________________________________________  Kylah Garcia - Resident   Surgery

## 2024-08-19 NOTE — PROGRESS NOTE ADULT - ASSESSMENT
This is a 49 year old male with stricturing ileocolonic Crohn's disease (diagnosed about 2 years prior to this presentation, previous exposure to ADA, ileocolic resection with anastomosis about 12 years age) who gastroenterology has been consulted for bowel obstruction. Original concern at time of presentation was for closed loop obstruction, underwent ex-lap and was not found to have signs of closed loop obstruction. Underwent lysis of adhesions and placed on hospital floor. Underwent colonoscopy 8/14 which identified no inflammation in rectum, sigmoid, and descending colon with solid stool in transverse, preventing evaluation of anastomotic stricture. Repeated CTE after NG tube decompression identified persistently dilated loops of small bowel proximal to the ileocolic anastomosis with associated mild neoterminal ileal thickening; additional probable site of disease just proximal to the anastomosis in the right lower abdomen. Findings suggestive of persistent partial small bowel obstruction.    #Acute small bowel obstruction secondary to ileocolonic anastomotic stricture, likely fibrotic  #Stricturing ileocolonic Crohn's disease  - Removal of NG tube over the weekend and patient able to tolerate consumption of liquids. Patient states he is passing some gas and had few liquid bowel movements over the weekend. While patient is currently able to tolerate liquids, there is still objective evidence of ongoing obstruction at ileocolonic anastomosis (repeat CTE with persistent dilation of bowel proximal to stricture, thickening at neoterminal ileum, mild distention of abdomen on physical exam). If obstruction was related to inflammation from Crohn's disease flare, would have anticipated significant improvement in obstruction 24-48 hours after initiation of IV methylprednisolone, which did not happen. Patient without global findings of active Crohn's disease. Colonoscopy from 8/14 did not reveal ongoing inflammation distal to ileocolonic anastomosis.   - If patient remains obstructed, bowel prep for colonoscopy is contraindicated. Likely would not benefit from repeated colonoscopy without bowel prep as previous attempt 8/14 after enema revealed solid stool in transverse colon, preventing completion of procedure.   - Surgery team describes tentative planning for surgical intervention Wednesday 8/21, 10 days after initial presentation with obstruction  - Please initiate calcium and vitamin D while patient continues steroids  - Consider decreasing steroid dose to limit patient's steroid exposure if deemed that patient is not receiving benefit from steroids  - Serial abdominal exams  - Postoperative care per surgery team    Hector Harkins,   Gastroenterology Fellow  GI Consult Pager Weekdays 7am-5pm: [phone]  Personal Pager Weekdays 7am-5pm: [phone]  Weeknights/Weekend/Holiday Coverage: Please Call the  for Contact Information  Follow Up Questions Welcome via [employer] Teams Messenger This is a 49 year old male with stricturing ileocolonic Crohn's disease (diagnosed about 2 years prior to this presentation, previous exposure to ADA, ileocolic resection with anastomosis about 12 years age) who gastroenterology has been consulted for bowel obstruction. Original concern at time of presentation was for closed loop obstruction, underwent ex-lap and was not found to have signs of closed loop obstruction. Underwent lysis of adhesions and placed on hospital floor. Underwent colonoscopy 8/14 which identified no inflammation in rectum, sigmoid, and descending colon with solid stool in transverse, preventing evaluation of anastomotic stricture. Repeated CTE after NG tube decompression identified persistently dilated loops of small bowel proximal to the ileocolic anastomosis with associated mild neoterminal ileal thickening; additional probable site of disease just proximal to the anastomosis in the right lower abdomen. Findings suggestive of persistent partial small bowel obstruction.    #Acute small bowel obstruction secondary to ileocolonic anastomotic stricture, likely fibrotic  #Stricturing ileocolonic Crohn's disease  - Removal of NG tube over the weekend and patient able to tolerate consumption of liquids. Patient states he is passing some gas and had few liquid bowel movements over the weekend. While patient is currently able to tolerate liquids, there is still objective evidence of ongoing obstruction at ileocolonic anastomosis (repeat CTE with persistent dilation of bowel proximal to stricture, thickening at neoterminal ileum, mild distention of abdomen on physical exam). If obstruction was related to inflammation from Crohn's disease flare, would have anticipated significant improvement in obstruction 24-48 hours after initiation of IV methylprednisolone, which did not happen. Patient without global findings of active Crohn's disease. Colonoscopy from 8/14 did not reveal ongoing inflammation distal to ileocolonic anastomosis.   - Will attempt to perform bowel prep with golytely. Please start golytely bowel prep now and perform throughout day slowly. Closely monitor for nausea, vomiting, abdominal pain, abdominal distention.  - If patient tolerates bowel prep, will plan to perform colonoscopy tomorrow. If patient does not tolerate bowel prep, patient will need surgical intervention for fibrotic stricture at site of anastomosis.  - Surgery team describes tentative planning for surgical intervention Wednesday 8/21, 10 days after initial presentation with obstruction  - Please initiate calcium and vitamin D while patient continues steroids  - Consider decreasing steroid dose to limit patient's steroid exposure if deemed that patient is not receiving benefit from steroids  - Serial abdominal exams  - Postoperative care per surgery team    Hector Harkins, DO  Gastroenterology Fellow  GI Consult Pager Weekdays 7am-5pm: [phone]  Personal Pager Weekdays 7am-5pm: [phone]  Weeknights/Weekend/Holiday Coverage: Please Call the  for Contact Information  Follow Up Questions Welcome via [facility] Teams Messenger

## 2024-08-19 NOTE — PROGRESS NOTE ADULT - ASSESSMENT
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 for abdominal pain. Patient afebrile, HD normal on presentation without acute laboratory abnormalaties noted. CTAP concerning for closed loop obstruction with two distinct transition points. Now s/p dx lap, lysis of adhesions (8/11). Medicine consulted for indeterminant quantiferon result.    #Crohn's disease  #Indeterminant Quant Result  Patient underwent TB testing per GI in preparation for possible immunosuppressive therapy, currently on methylpred IV 20mg TID  Hep B Ag, Ab, and Core Ab negative  Indeterminant result likely as a result from incorrect collection of specimen, patient has no risk factors for TB, no known exposures.  - Repeat quant TB pending, however skin PPD test was negative - no further action required.  - Rest of care per surgery and GI    Thank you for this consult, medicine will follow.     Case discussed with Dr. Walters

## 2024-08-20 ENCOUNTER — TRANSCRIPTION ENCOUNTER (OUTPATIENT)
Age: 49
End: 2024-08-20

## 2024-08-20 LAB
ANION GAP SERPL CALC-SCNC: 8 MMOL/L — SIGNIFICANT CHANGE UP (ref 5–17)
APTT BLD: 32.2 SEC — SIGNIFICANT CHANGE UP (ref 24.5–35.6)
BLD GP AB SCN SERPL QL: NEGATIVE — SIGNIFICANT CHANGE UP
BUN SERPL-MCNC: 12 MG/DL — SIGNIFICANT CHANGE UP (ref 7–23)
CALCIUM SERPL-MCNC: 9 MG/DL — SIGNIFICANT CHANGE UP (ref 8.4–10.5)
CHLORIDE SERPL-SCNC: 106 MMOL/L — SIGNIFICANT CHANGE UP (ref 96–108)
CO2 SERPL-SCNC: 25 MMOL/L — SIGNIFICANT CHANGE UP (ref 22–31)
COPPER SERPL-MCNC: 131 UG/DL — SIGNIFICANT CHANGE UP (ref 69–132)
CREAT SERPL-MCNC: 0.54 MG/DL — SIGNIFICANT CHANGE UP (ref 0.5–1.3)
CRP SERPL-MCNC: <3 MG/L — SIGNIFICANT CHANGE UP (ref 0–4)
EGFR: 124 ML/MIN/1.73M2 — SIGNIFICANT CHANGE UP
GLUCOSE SERPL-MCNC: 127 MG/DL — HIGH (ref 70–99)
HCT VFR BLD CALC: 41 % — SIGNIFICANT CHANGE UP (ref 39–50)
HGB BLD-MCNC: 13.5 G/DL — SIGNIFICANT CHANGE UP (ref 13–17)
INR BLD: 1.01 — SIGNIFICANT CHANGE UP (ref 0.85–1.18)
MAGNESIUM SERPL-MCNC: 1.8 MG/DL — SIGNIFICANT CHANGE UP (ref 1.6–2.6)
MCHC RBC-ENTMCNC: 27.9 PG — SIGNIFICANT CHANGE UP (ref 27–34)
MCHC RBC-ENTMCNC: 32.9 GM/DL — SIGNIFICANT CHANGE UP (ref 32–36)
MCV RBC AUTO: 84.7 FL — SIGNIFICANT CHANGE UP (ref 80–100)
NRBC # BLD: 0 /100 WBCS — SIGNIFICANT CHANGE UP (ref 0–0)
PHOSPHATE SERPL-MCNC: 3.2 MG/DL — SIGNIFICANT CHANGE UP (ref 2.5–4.5)
PLATELET # BLD AUTO: 311 K/UL — SIGNIFICANT CHANGE UP (ref 150–400)
POTASSIUM SERPL-MCNC: 4 MMOL/L — SIGNIFICANT CHANGE UP (ref 3.5–5.3)
POTASSIUM SERPL-SCNC: 4 MMOL/L — SIGNIFICANT CHANGE UP (ref 3.5–5.3)
PROTHROM AB SERPL-ACNC: 11.5 SEC — SIGNIFICANT CHANGE UP (ref 9.5–13)
RBC # BLD: 4.84 M/UL — SIGNIFICANT CHANGE UP (ref 4.2–5.8)
RBC # FLD: 13.6 % — SIGNIFICANT CHANGE UP (ref 10.3–14.5)
RH IG SCN BLD-IMP: NEGATIVE — SIGNIFICANT CHANGE UP
SODIUM SERPL-SCNC: 139 MMOL/L — SIGNIFICANT CHANGE UP (ref 135–145)
VIT B1 SERPL-MCNC: 122.9 NMOL/L — SIGNIFICANT CHANGE UP (ref 66.5–200)
WBC # BLD: 10.28 K/UL — SIGNIFICANT CHANGE UP (ref 3.8–10.5)
WBC # FLD AUTO: 10.28 K/UL — SIGNIFICANT CHANGE UP (ref 3.8–10.5)

## 2024-08-20 PROCEDURE — 99232 SBSQ HOSP IP/OBS MODERATE 35: CPT

## 2024-08-20 PROCEDURE — 99232 SBSQ HOSP IP/OBS MODERATE 35: CPT | Mod: GC,57

## 2024-08-20 PROCEDURE — 45380 COLONOSCOPY AND BIOPSY: CPT | Mod: GC

## 2024-08-20 PROCEDURE — 88305 TISSUE EXAM BY PATHOLOGIST: CPT | Mod: 26

## 2024-08-20 DEVICE — HYDRA WIRE: Type: IMPLANTABLE DEVICE | Status: FUNCTIONAL

## 2024-08-20 DEVICE — CATH BLLN CRE .035INX7.7FR 10-11-12MM: Type: IMPLANTABLE DEVICE | Status: FUNCTIONAL

## 2024-08-20 RX ORDER — I.V. FAT EMULSION 20 G/100ML
0.64 EMULSION INTRAVENOUS
Qty: 50 | Refills: 0 | Status: DISCONTINUED | OUTPATIENT
Start: 2024-08-20 | End: 2024-08-20

## 2024-08-20 RX ORDER — ACETAMINOPHEN 325 MG/1
1000 TABLET ORAL ONCE
Refills: 0 | Status: COMPLETED | OUTPATIENT
Start: 2024-08-21 | End: 2024-08-21

## 2024-08-20 RX ORDER — ELECTROLYTE SOLUTION,INJ
1 VIAL (ML) INTRAVENOUS
Refills: 0 | Status: DISCONTINUED | OUTPATIENT
Start: 2024-08-20 | End: 2024-08-20

## 2024-08-20 RX ADMIN — Medication 5000 UNIT(S): at 05:53

## 2024-08-20 RX ADMIN — Medication 20 MILLIGRAM(S): at 05:53

## 2024-08-20 RX ADMIN — CHLORHEXIDINE GLUCONATE 1 APPLICATION(S): 40 SOLUTION TOPICAL at 05:54

## 2024-08-20 RX ADMIN — Medication 5000 UNIT(S): at 14:20

## 2024-08-20 RX ADMIN — Medication 5000 UNIT(S): at 21:16

## 2024-08-20 RX ADMIN — Medication 1 EACH: at 17:39

## 2024-08-20 RX ADMIN — Medication 100 GRAM(S): at 12:29

## 2024-08-20 RX ADMIN — I.V. FAT EMULSION 20.8 GM/KG/DAY: 20 EMULSION INTRAVENOUS at 17:38

## 2024-08-20 NOTE — PROGRESS NOTE ADULT - SUBJECTIVE AND OBJECTIVE BOX
INTERVAL HPI/OVERNIGHT EVENTS: finished golytely, multiple light cloudy liquid bms    STATUS POST:  8/11: dx lap, lysis of adhesions    SUBJECTIVE: Patient seen and examined bedside with chief resident on AM rounds. He has no acute complaints this morning. He reports that he has been having liquid bowel movements that are becoming more clear in color, in preparation for his colonoscopy today. Denies cp, sob, nausea, emesis, or fevers.    MEDICATIONS  (STANDING):  chlorhexidine 2% Cloths 1 Application(s) Topical <User Schedule>  dextrose 5% + sodium chloride 0.45%. 1000 milliLiter(s) (80 mL/Hr) IV Continuous <Continuous>  heparin   Injectable 5000 Unit(s) SubCutaneous every 8 hours  lipid, fat emulsion (Fish Oil and Plant Based) 20% Infusion 0.641 Gm/kG/Day (20.8 mL/Hr) IV Continuous <Continuous>  methylPREDNISolone sodium succinate Injectable 20 milliGRAM(s) IV Push every 8 hours  Parenteral Nutrition - Adult 1 Each (63 mL/Hr) TPN Continuous <Continuous>    MEDICATIONS  (PRN):  ondansetron Injectable 4 milliGRAM(s) IV Push every 6 hours PRN Nausea  sodium chloride 0.9% lock flush 10 milliLiter(s) IV Push every 1 hour PRN Pre/post blood products, medications, blood draw, and to maintain line patency      Vital Signs Last 24 Hrs  T(C): 36.8 (20 Aug 2024 07:55), Max: 36.8 (19 Aug 2024 20:55)  T(F): 98.3 (20 Aug 2024 04:58), Max: 98.3 (20 Aug 2024 03:15)  HR: 65 (20 Aug 2024 07:55) (64 - 68)  BP: 115/67 (20 Aug 2024 07:55) (112/69 - 123/79)  BP(mean): 83 (20 Aug 2024 07:55) (83 - 83)  RR: 18 (20 Aug 2024 07:55) (18 - 18)  SpO2: 96% (20 Aug 2024 07:55) (95% - 97%)    Parameters below as of 20 Aug 2024 04:58  Patient On (Oxygen Delivery Method): room air      PHYSICAL EXAM:  Constitutional: A&Ox3, resting comfortably in bed  Respiratory: non labored breathing, no respiratory distress  Cardiovascular: NSR, RRR  Gastrointestinal: Abdomen soft, mildly distended - much improved from days prior, nontender to palpation                 Incision: all incisions are c/d/i  Genitourinary: voiding  Extremities: wwp, no calf tenderness or edema, +SCDs      I&O's Detail    19 Aug 2024 07:01  -  20 Aug 2024 07:00  --------------------------------------------------------  IN:    dextrose 5% + sodium chloride 0.45%: 1200 mL    Fat Emulsion (Fish Oil &amp; Plant Based) 20% Infusion: 208 mL    Oral Fluid: 1220 mL    TPN (Total Parenteral Nutrition): 945 mL  Total IN: 3573 mL    OUT:    Stool (mL): 600 mL    Voided (mL): 1800 mL  Total OUT: 2400 mL    Total NET: 1173 mL      20 Aug 2024 07:01  -  20 Aug 2024 08:55  --------------------------------------------------------  IN:  Total IN: 0 mL    OUT:    Voided (mL): 600 mL  Total OUT: 600 mL    Total NET: -600 mL          LABS:                        13.5   10.28 )-----------( 311      ( 20 Aug 2024 07:36 )             41.0     08-20    139  |  106  |  12  ----------------------------<  127<H>  4.0   |  25  |  0.54    Ca    9.0      20 Aug 2024 07:36  Phos  3.2     08-20  Mg     1.8     08-20      PT/INR - ( 20 Aug 2024 07:36 )   PT: 11.5 sec;   INR: 1.01          PTT - ( 20 Aug 2024 07:36 )  PTT:32.2 sec  Urinalysis Basic - ( 20 Aug 2024 07:36 )    Color: x / Appearance: x / SG: x / pH: x  Gluc: 127 mg/dL / Ketone: x  / Bili: x / Urobili: x   Blood: x / Protein: x / Nitrite: x   Leuk Esterase: x / RBC: x / WBC x   Sq Epi: x / Non Sq Epi: x / Bacteria: x        RADIOLOGY & ADDITIONAL STUDIES:

## 2024-08-20 NOTE — PROGRESS NOTE ADULT - ASSESSMENT
49M with PMHx of Crohn's disease (diagnosed in 2021, not currently on medications) and PSHx of SBR of terminal ileum (10 years ago) who presents to [facility] for abdominal pain. Patient afebrile, HD normal on presentation without acute laboratory abnormalaties noted. CTAP concerning for closed loop obstruction with two distinct transition points. Now s/p dx lap, lysis of adhesions (8/11)    C-scope 8/20  NPO/IVF  PICC/TPN  Pain/nausea control PRN  GI Consult - 20 mg Methylprednisolone TID  HSQ/SCDs/IS/OOBA  AM labs

## 2024-08-20 NOTE — CHART NOTE - NSCHARTNOTEFT_GEN_A_CORE
Colonoscopy    Findings:  Evidence of a previous ileo-colonic anastomosis was seen. The anastomosis was strictured down with a small amount of inflammation at the site. This could not be traversed with the colonoscope. A CRE balloon over a soft tip wire was advanced to the level of the anastomosis. The wire could not be passed well into the [id number] without resistance, thus decision made to not proceed with dilation. Multiple cold forceps biopsies were performed for histology.	    Impressions:  	Previous Surgery in the colon. (Biopsy).    Plan:	  Clear Liquid Diet, NPO past midnight  Await pathology results.  Return to floor for further management  Case discussed with Dr. Cloud, plan for operative resection tomorrow    Hector Harkins DO  Gastroenterology Fellow  GI Consult Pager Weekdays 7am-5pm: [phone]  Personal Pager Weekdays 7am-5pm: [phone]  Weeknights/Weekend/Holiday Coverage: Please Call the  for Contact Information  Follow Up Questions Welcome via [place] Messenger

## 2024-08-20 NOTE — PROGRESS NOTE ADULT - ASSESSMENT
49M with PMHx of Crohn's disease (diagnosed in 2021, not currently on medications) and PSHx of SBR of terminal ileum (10 years ago) who presents to [facility] for abdominal pain. Patient afebrile, HD normal on presentation without acute laboratory abnormalaties noted. CTAP concerning for closed loop obstruction with two distinct transition points. Now s/p dx lap, lysis of adhesions (8/11). Medicine consulted for indeterminant quantiferon result.    #Crohn's disease  #Indeterminant Quant Result  Patient underwent TB testing per GI in preparation for possible immunosuppressive therapy, currently on methylpred IV 20mg TID  Hep B Ag, Ab, and Core Ab negative  Indeterminant result likely as a result from incorrect collection of specimen, patient has no risk factors for TB, no known exposures.  - going for colonoscopy today  - Repeat quant TB specimen received and pending, however skin PPD test was negative - no further action required.  - Rest of care per surgery and GI    Thank you for this consult, medicine will follow.     Case discussed with Dr. Walters

## 2024-08-20 NOTE — PROGRESS NOTE ADULT - ATTENDING COMMENTS
CRS Attending  Colonoscopy performed this morning by GI team, findings and treatment plan discussed with GI team and with patient.  Scope notable for stricture at prior ileocolic anastomosis, not able to pass a wire or a balloon.  Will plan to proceed with laparoscopic, possible open, ileocolic resection tomorrow.  The nature of the procedure, as well as associated risks, benefits, alternatives of the procedure have been outlined discussed and reviewed with the patient. These risks including but not limited to bleeding, infection, injury to adjacent organs, anastomotic leak, need for secondary surgery, need for ostomy creation, incisional dehiscence, incisional hernia, change in bowel habits, as well as the risk of heart and lung complications, stroke, DVT/PE and death were detailed. All questions were answered, the patient expressed understanding and consents to the planned procedure.  DC steroids.  Clear liquids ok, NPO at midnight for OR tomorrow. Continue TPN.  Preop labs.  Discussed with surgical chief resident.

## 2024-08-20 NOTE — PROGRESS NOTE ADULT - ATTENDING COMMENTS
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 for abdominal pain. Patient afebrile, HD normal on presentation without acute laboratory abnormalaties noted. CTAP concerning for closed loop obstruction with two distinct transition points. Now s/p dx lap, lysis of adhesions (8/11). Medicine consulted for indeterminant quantiferon result,   intervally - started on TPN 8/16, CT enterography 8/16-reviewed- with possible partial sbo,       for OR tomorrow  on TPN -no new complaint.   vitals stable and saturating well on room air.       - bowel obstruction, inflammatory vs stricture/fibrosis vs adhesion -improvement seen in repeat CT  - 8/20/24- colonoscopy with stricture at previous anastomosis site with mild inflammation.   - CLD and NPO past midnight for surgery tomorrow   - on steroids as per surgery and GI given methylprednisone 20 mg iv q 8 hourly- now off.   - CRP normal now.- daily CRP 7.7 on 8/16--> 9.6 on 8/17 --> 7. on 8/18-  wnl on 8/19-    - fu repeat Quantiferon test, fu PPD -negative.   - prealbumin 17 on 8/16    Vitamin d insufficiency - 23.7 on 8/16  when allow to take oral would need Vitamin D supplement.   B12- 755 on 8/16- adequate     - dvt prophylasix - on heparin sq    pt is medically optimized for surgery       Thank you for allowing medicine to participate in the care, will follow. [facility].

## 2024-08-20 NOTE — PROGRESS NOTE ADULT - SUBJECTIVE AND OBJECTIVE BOX
Medicine Progress Note    SUBJECTIVE / OVERNIGHT EVENTS:  Patient was seen and examined at bedside, not offering any complaints. Going for cscope today  Otherwise negative ROS    MEDICATIONS  (STANDING):  chlorhexidine 2% Cloths 1 Application(s) Topical <User Schedule>  dextrose 5% + sodium chloride 0.45%. 1000 milliLiter(s) (80 mL/Hr) IV Continuous <Continuous>  heparin   Injectable 5000 Unit(s) SubCutaneous every 8 hours  lipid, fat emulsion (Fish Oil and Plant Based) 20% Infusion 0.641 Gm/kG/Day (20.8 mL/Hr) IV Continuous <Continuous>  Parenteral Nutrition - Adult 1 Each (63 mL/Hr) TPN Continuous <Continuous>  Parenteral Nutrition - Adult 1 Each (63 mL/Hr) TPN Continuous <Continuous>    MEDICATIONS  (PRN):  ondansetron Injectable 4 milliGRAM(s) IV Push every 6 hours PRN Nausea  sodium chloride 0.9% lock flush 10 milliLiter(s) IV Push every 1 hour PRN Pre/post blood products, medications, blood draw, and to maintain line patency    CAPILLARY BLOOD GLUCOSE            OBJECTIVE:  Vital Signs Last 24 Hrs  T(C): 36.4 (20 Aug 2024 13:07), Max: 36.8 (19 Aug 2024 20:55)  T(F): 97.6 (20 Aug 2024 13:07), Max: 98.3 (20 Aug 2024 03:15)  HR: 69 (20 Aug 2024 13:07) (64 - 69)  BP: 111/66 (20 Aug 2024 13:07) (111/66 - 120/74)  BP(mean): 83 (20 Aug 2024 07:55) (83 - 83)  RR: 18 (20 Aug 2024 13:07) (18 - 18)  SpO2: 96% (20 Aug 2024 13:07) (95% - 97%)    Parameters below as of 20 Aug 2024 13:07  Patient On (Oxygen Delivery Method): room air        PHYSICAL EXAM:  General: AAOx3, NAD, euvolemic  Head: NC/AT; MMM; PERRL  Neck: Supple; no JVD  Respiratory: CTAB; no wheezes/rales/rhonchi  Cardiovascular: Regular rhythm/rate; S1/S2+, no m/r/g auscultated  Gastrointestinal: Soft; NT mildly distended; bowel sounds normal and present in all 4 quadrants, incision cdi  Extremities: WWP; no b/l U/E edema, no b/l L/E edema  Neurological: CNII-XII grossly intact; no obvious focal deficits  I&O's Summary    19 Aug 2024 07:01  -  20 Aug 2024 07:00  --------------------------------------------------------  IN: 3573 mL / OUT: 2400 mL / NET: 1173 mL    20 Aug 2024 07:01  -  20 Aug 2024 14:07  --------------------------------------------------------  IN: 0 mL / OUT: 600 mL / NET: -600 mL        LABS:                        13.5   10.28 )-----------( 311      ( 20 Aug 2024 07:36 )             41.0     08-20    139  |  106  |  12  ----------------------------<  127<H>  4.0   |  25  |  0.54    Ca    9.0      20 Aug 2024 07:36  Phos  3.2     08-20  Mg     1.8     08-20      PT/INR - ( 20 Aug 2024 07:36 )   PT: 11.5 sec;   INR: 1.01          PTT - ( 20 Aug 2024 07:36 )  PTT:32.2 sec      Urinalysis Basic - ( 20 Aug 2024 07:36 )    Color: x / Appearance: x / SG: x / pH: x  Gluc: 127 mg/dL / Ketone: x  / Bili: x / Urobili: x   Blood: x / Protein: x / Nitrite: x   Leuk Esterase: x / RBC: x / WBC x   Sq Epi: x / Non Sq Epi: x / Bacteria: x            RADIOLOGY & ADDITIONAL TESTS:  Imaging from Last 24 Hours:

## 2024-08-21 ENCOUNTER — APPOINTMENT (OUTPATIENT)
Dept: COLORECTAL SURGERY | Facility: HOSPITAL | Age: 49
End: 2024-08-21

## 2024-08-21 LAB
ANION GAP SERPL CALC-SCNC: 10 MMOL/L — SIGNIFICANT CHANGE UP (ref 5–17)
BLD GP AB SCN SERPL QL: NEGATIVE — SIGNIFICANT CHANGE UP
BUN SERPL-MCNC: 11 MG/DL — SIGNIFICANT CHANGE UP (ref 7–23)
CALCIUM SERPL-MCNC: 9 MG/DL — SIGNIFICANT CHANGE UP (ref 8.4–10.5)
CHLORIDE SERPL-SCNC: 104 MMOL/L — SIGNIFICANT CHANGE UP (ref 96–108)
CO2 SERPL-SCNC: 28 MMOL/L — SIGNIFICANT CHANGE UP (ref 22–31)
CREAT SERPL-MCNC: 0.69 MG/DL — SIGNIFICANT CHANGE UP (ref 0.5–1.3)
CRP SERPL-MCNC: <3 MG/L — SIGNIFICANT CHANGE UP (ref 0–4)
EGFR: 115 ML/MIN/1.73M2 — SIGNIFICANT CHANGE UP
GLUCOSE BLDC GLUCOMTR-MCNC: 104 MG/DL — HIGH (ref 70–99)
GLUCOSE SERPL-MCNC: 107 MG/DL — HIGH (ref 70–99)
HCT VFR BLD CALC: 44.2 % — SIGNIFICANT CHANGE UP (ref 39–50)
HGB BLD-MCNC: 13.9 G/DL — SIGNIFICANT CHANGE UP (ref 13–17)
MAGNESIUM SERPL-MCNC: 2.2 MG/DL — SIGNIFICANT CHANGE UP (ref 1.6–2.6)
MCHC RBC-ENTMCNC: 26.7 PG — LOW (ref 27–34)
MCHC RBC-ENTMCNC: 31.4 GM/DL — LOW (ref 32–36)
MCV RBC AUTO: 84.8 FL — SIGNIFICANT CHANGE UP (ref 80–100)
NRBC # BLD: 0 /100 WBCS — SIGNIFICANT CHANGE UP (ref 0–0)
PHOSPHATE SERPL-MCNC: 3.6 MG/DL — SIGNIFICANT CHANGE UP (ref 2.5–4.5)
PLATELET # BLD AUTO: 312 K/UL — SIGNIFICANT CHANGE UP (ref 150–400)
POTASSIUM SERPL-MCNC: 3.4 MMOL/L — LOW (ref 3.5–5.3)
POTASSIUM SERPL-SCNC: 3.4 MMOL/L — LOW (ref 3.5–5.3)
RBC # BLD: 5.21 M/UL — SIGNIFICANT CHANGE UP (ref 4.2–5.8)
RBC # FLD: 13.7 % — SIGNIFICANT CHANGE UP (ref 10.3–14.5)
RH IG SCN BLD-IMP: NEGATIVE — SIGNIFICANT CHANGE UP
SODIUM SERPL-SCNC: 142 MMOL/L — SIGNIFICANT CHANGE UP (ref 135–145)
WBC # BLD: 11.28 K/UL — HIGH (ref 3.8–10.5)
WBC # FLD AUTO: 11.28 K/UL — HIGH (ref 3.8–10.5)

## 2024-08-21 PROCEDURE — 44160 REMOVAL OF COLON: CPT | Mod: GC

## 2024-08-21 PROCEDURE — 88307 TISSUE EXAM BY PATHOLOGIST: CPT | Mod: 26

## 2024-08-21 PROCEDURE — 99232 SBSQ HOSP IP/OBS MODERATE 35: CPT | Mod: GC

## 2024-08-21 DEVICE — STAPLER ETHICON PROXIMATE 100MM WITH BLUE RELOAD
Type: IMPLANTABLE DEVICE | Status: NON-FUNCTIONAL
Removed: 2024-08-21

## 2024-08-21 DEVICE — STAPLER ETHICON PROXIMATE RELOAD 100MM BLUE
Type: IMPLANTABLE DEVICE | Status: NON-FUNCTIONAL
Removed: 2024-08-21

## 2024-08-21 RX ORDER — ONDANSETRON 2 MG/ML
4 INJECTION, SOLUTION INTRAMUSCULAR; INTRAVENOUS EVERY 6 HOURS
Refills: 0 | Status: DISCONTINUED | OUTPATIENT
Start: 2024-08-21 | End: 2024-08-27

## 2024-08-21 RX ORDER — HYDROMORPHONE HYDROCHLORIDE 2 MG/1
0.25 TABLET ORAL EVERY 6 HOURS
Refills: 0 | Status: DISCONTINUED | OUTPATIENT
Start: 2024-08-21 | End: 2024-08-27

## 2024-08-21 RX ORDER — NEOMYCIN SULFATE
1000 POWDER (GRAM) MISCELLANEOUS ONCE
Refills: 0 | Status: COMPLETED | OUTPATIENT
Start: 2024-08-21 | End: 2024-08-21

## 2024-08-21 RX ORDER — I.V. FAT EMULSION 20 G/100ML
0.64 EMULSION INTRAVENOUS
Qty: 50 | Refills: 0 | Status: DISCONTINUED | OUTPATIENT
Start: 2024-08-21 | End: 2024-08-21

## 2024-08-21 RX ORDER — HEPARIN SODIUM,BOVINE 1000/ML
5000 VIAL (ML) INJECTION EVERY 8 HOURS
Refills: 0 | Status: DISCONTINUED | OUTPATIENT
Start: 2024-08-21 | End: 2024-08-27

## 2024-08-21 RX ORDER — ELECTROLYTE SOLUTION,INJ
1 VIAL (ML) INTRAVENOUS
Refills: 0 | Status: DISCONTINUED | OUTPATIENT
Start: 2024-08-21 | End: 2024-08-21

## 2024-08-21 RX ORDER — HYDROMORPHONE HYDROCHLORIDE 2 MG/1
0.5 TABLET ORAL EVERY 6 HOURS
Refills: 0 | Status: DISCONTINUED | OUTPATIENT
Start: 2024-08-21 | End: 2024-08-27

## 2024-08-21 RX ORDER — METRONIDAZOLE 250 MG
500 TABLET ORAL ONCE
Refills: 0 | Status: COMPLETED | OUTPATIENT
Start: 2024-08-21 | End: 2024-08-21

## 2024-08-21 RX ORDER — POTASSIUM CHLORIDE 10 MEQ
10 TABLET, EXT RELEASE, PARTICLES/CRYSTALS ORAL
Refills: 0 | Status: DISCONTINUED | OUTPATIENT
Start: 2024-08-21 | End: 2024-08-21

## 2024-08-21 RX ADMIN — I.V. FAT EMULSION 20.8 GM/KG/DAY: 20 EMULSION INTRAVENOUS at 19:33

## 2024-08-21 RX ADMIN — CHLORHEXIDINE GLUCONATE 1 APPLICATION(S): 40 SOLUTION TOPICAL at 05:13

## 2024-08-21 RX ADMIN — Medication 5000 UNIT(S): at 05:13

## 2024-08-21 RX ADMIN — Medication 80 MILLILITER(S): at 05:36

## 2024-08-21 RX ADMIN — Medication 500 MILLIGRAM(S): at 06:54

## 2024-08-21 RX ADMIN — Medication 1000 MILLIGRAM(S): at 06:54

## 2024-08-21 RX ADMIN — Medication 5000 UNIT(S): at 23:47

## 2024-08-21 RX ADMIN — Medication 1 EACH: at 19:33

## 2024-08-21 RX ADMIN — Medication 100 MILLIEQUIVALENT(S): at 08:41

## 2024-08-21 RX ADMIN — HYDROMORPHONE HYDROCHLORIDE 0.5 MILLIGRAM(S): 2 TABLET ORAL at 17:36

## 2024-08-21 NOTE — PROGRESS NOTE ADULT - ASSESSMENT
49M with PMHx of Crohn's disease (diagnosed in 2021, not currently on medications) and PSHx of SBR of terminal ileum (10 years ago) who presents to [facility] for abdominal pain. Patient afebrile, HD normal on presentation without acute laboratory abnormalaties noted. CTAP concerning for closed loop obstruction with two distinct transition points. Now s/p dx lap, lysis of adhesions (8/11). Colonoscpy 8/20 significant for ileo-colonic anastamosis stricture.     NPO/IVF  PICC/TPN  Pain/nausea control PRN  GI Consult - 20 mg Methylprednisolone TID (currently holding)  HSQ/SCDs/IS/OOBA  AM labs  OR today

## 2024-08-21 NOTE — CHART NOTE - NSCHARTNOTEFT_GEN_A_CORE
Admitting Diagnosis:   Patient is a 49y old  Male who presents with a chief complaint of abdominal pain (21 Aug 2024 11:17)      PAST MEDICAL & SURGICAL HISTORY:  Crohn's disease      S/P small bowel resection          Current Nutrition Order:   Clear liquids   TPN via PICC:  341g Dextrose, 85g AA, 50g 20% Lipids to provide in total  1999Kcal, 85g protein, GIR of 3.0 based on actual body weight (78kg), 1.3grams protein/kg ideal body weight (61.6kg)    PO Intake: Good (%) [   ]  Fair (50-75%) [   ] Poor (<25%) [ X ]    GI Issues: Extensive GI history, no N/V/D    Pain: No pain noted    Skin Integrity: Nabil score 21    Labs:   08-21    142  |  104  |  11  ----------------------------<  107<H>  3.4<L>   |  28  |  0.69    Ca    9.0      21 Aug 2024 05:30  Phos  3.6     08-21  Mg     2.2     08-21      CAPILLARY BLOOD GLUCOSE      POCT Blood Glucose.: 104 mg/dL (21 Aug 2024 09:35)      Medications:  MEDICATIONS  (STANDING):  heparin   Injectable 5000 Unit(s) SubCutaneous every 8 hours  lipid, fat emulsion (Fish Oil and Plant Based) 20% Infusion 0.641 Gm/kG/Day (20.8 mL/Hr) IV Continuous <Continuous>  Parenteral Nutrition - Adult 1 Each (63 mL/Hr) TPN Continuous <Continuous>  Parenteral Nutrition - Adult 1 Each (63 mL/Hr) TPN Continuous <Continuous>    MEDICATIONS  (PRN):  HYDROmorphone  Injectable 0.25 milliGRAM(s) IV Push every 6 hours PRN Moderate Pain (4 - 6)  HYDROmorphone  Injectable 0.5 milliGRAM(s) IV Push every 6 hours PRN Severe Pain (7 - 10)  ondansetron Injectable 4 milliGRAM(s) IV Push every 6 hours PRN Nausea      Height for BMI (FEET)	5 Feet  Height for BMI (INCHES)	5 Inch(s)  Height for BMI (CENTIMETERS)	165.1 Centimeter(s)  Weight for BMI (lbs)	171 lb  Weight for BMI (kg)	77.6 kg  Body Mass Index	28.4    Estimated energy needs:   Estimated Energy Needs From (kathy/kg)	30  Estimated Energy Needs To (kathy/kg)	35  Estimated Energy Needs Calculated From (kathy/kg)	1848  Estimated Energy Needs Calculated To (kathy/kg)	2156    Estimated Protein Needs From (g/kg)	1.2  Estimated Protein Needs To (g/kg)	1.5  Estimated Protein Needs Calculated From (g/kg)	73.92  Estimated Protein Needs Calculated To (g/kg)	92.4    Estimated Fluid Needs From (ml/kg)	25  Estimated Fluid Needs To (ml/kg)	30  Estimated Fluid Needs Calculated From (ml/kg)	1540  Estimated Fluid Needs Calculated To (ml/kg)	1848  Other Calculations	Ideal body weight (61.6kg) used to calculate energy needs due to pt's current body weight exceeds  % (125%) ideal body weight. Needs adjusted for malnutrition.      Subjective:   49M with PMHx of Crohn's disease (diagnosed in 2021, not currently on medications) and PSHx of SBR of terminal ileum (10 years ago) who presents to [facility] for abdominal pain. Patient afebrile, HD normal on presentation without acute laboratory abnormalaties noted. CTAP concerning for closed loop obstruction with two distinct transition points. Now s/p dx lap, lysis of adhesions (8/11). Colonoscopy 8/20 significant for ileo-colonic anastamosis stricture. Now s/p Excision of part of colon and excision of terminal ileum with ileocolic anastomosis    Pt in OR earlier today. Continues on TPN meeting 100% nutrition needs and remains appropriate at this time. Clear liquids also started today. Will continue to follow clinical course, provide diet education as diet advances. RD to follow.    Previous Nutrition Diagnosis:  Severe Acute on Chronic Malnutrition related to inadequate PO intake to meet physiological demand for nutrients as evidenced by Severe/Moderate muscle/fat depletions    Active [ X ]  Resolved [   ]    Goal: Pt to meet >75% of estimated nutrition needs daily per course of hospitalization via optimal route.    Recommendations:  1. TPN via PICC:  341g Dextrose, 85g AA, 50g 20% Lipids to provide in total  1999Kcal, 85g protein, GIR of 3.0 based on actual body weight (78kg), 1.3grams protein/kg ideal body weight (61.6kg)  >>>MVI, thiamine in bag  >>>Weekly lipid panel while on TPN, next 8/23; hold lipid inj. if TG >400  2. Clear liquids - advance as medically feasible  3. Pain regimen per team   4. RDN will continue to monitor, reassess, and intervene as appropriate.    Education: Deferred, pt s/p OR    Risk Level: High [ X ] Moderate [   ] Low [   ]

## 2024-08-21 NOTE — PRE-OP CHECKLIST - ADVANCE DIRECTIVE ADDRESSED/READDRESSED
Aniyah Nguyen(spouse): [phone]
Aniyah Nguyen (spouse): [phone]
none
done
HCP completed - Aniyah Nguyen/done

## 2024-08-21 NOTE — PROGRESS NOTE ADULT - SUBJECTIVE AND OBJECTIVE BOX
GASTROENTEROLOGY PROGRESS NOTE    INTERVAL/SUBJECTIVE:  Feels well this morning. No further bowel movements. Denies nausea, vomiting, abdominal pain. Tentatively planned for surgical intervention today.     Allergies    No Known Allergies    Intolerances      MEDICATIONS:  MEDICATIONS  (STANDING):  chlorhexidine 2% Cloths 1 Application(s) Topical <User Schedule>  dextrose 5% + sodium chloride 0.45%. 1000 milliLiter(s) (80 mL/Hr) IV Continuous <Continuous>  heparin   Injectable 5000 Unit(s) SubCutaneous every 8 hours  lipid, fat emulsion (Fish Oil and Plant Based) 20% Infusion 0.641 Gm/kG/Day (20.8 mL/Hr) IV Continuous <Continuous>  lipid, fat emulsion (Fish Oil and Plant Based) 20% Infusion 0.641 Gm/kG/Day (20.8 mL/Hr) IV Continuous <Continuous>  Parenteral Nutrition - Adult 1 Each (63 mL/Hr) TPN Continuous <Continuous>  Parenteral Nutrition - Adult 1 Each (63 mL/Hr) TPN Continuous <Continuous>    MEDICATIONS  (PRN):  ondansetron Injectable 4 milliGRAM(s) IV Push every 6 hours PRN Nausea  sodium chloride 0.9% lock flush 10 milliLiter(s) IV Push every 1 hour PRN Pre/post blood products, medications, blood draw, and to maintain line patency    Vital Signs Last 24 Hrs  T(C): 36.8 (21 Aug 2024 09:17), Max: 36.8 (20 Aug 2024 20:27)  T(F): 98.2 (21 Aug 2024 09:17), Max: 98.3 (20 Aug 2024 20:27)  HR: 75 (21 Aug 2024 09:17) (58 - 75)  BP: 128/75 (21 Aug 2024 09:17) (111/66 - 128/75)  BP(mean): 92 (21 Aug 2024 05:05) (92 - 92)  RR: 18 (21 Aug 2024 09:17) (17 - 18)  SpO2: 98% (21 Aug 2024 09:17) (95% - 98%)    Parameters below as of 21 Aug 2024 05:05  Patient On (Oxygen Delivery Method): room air        08-20 @ 07:01  -  08-21 @ 07:00  --------------------------------------------------------  IN: 2343.6 mL / OUT: 2550 mL / NET: -206.4 mL      General: Well developed, well nourished, in no acute distress, praying  Eyes: Anicteric sclerae, moist conjunctivae, extraocular motions intact, pupils equal round and reactive to light  HENT: Pink and moist mucous membranes, good dentition, tongue normal in appearance without lesions and has symmetrical movement, no obvious oral lesions noted, pharynx normal without tonsillar swelling or exudates  Neck: Trachea midline, supple, no obvious lymphadenopathy  Chest: Symmetric appearing, non tender to palpation  Cardiovascular: Regular rate and rhythm, no obvious murmur rub or gallop  Respiratory: Normal respiratory effort, clear lung sounds in anterior and posterior posts bilaterally, no obvious rales ronchi or wheeze  Abdomen: Symmetric appearing, no obvious lesions, mildly distended, normal bowel sounds, soft, mild tenderness to palpation, no rebound or guarding  Extremities: Normal range of motion, no clubbing cyanosis or edema  Neurological: Awake alert and oriented to person place time and situation  Skin: Warm and dry, no obvious rash, no jaundice    LABS:                        13.9   11.28 )-----------( 312      ( 21 Aug 2024 05:30 )             44.2     08-21    142  |  104  |  11  ----------------------------<  107<H>  3.4<L>   |  28  |  0.69    Ca    9.0      21 Aug 2024 05:30  Phos  3.6     08-21  Mg     2.2     08-21      PT/INR - ( 20 Aug 2024 07:36 )   PT: 11.5 sec;   INR: 1.01          PTT - ( 20 Aug 2024 07:36 )  PTT:32.2 sec    RADIOLOGY & ADDITIONAL STUDIES: Reviewed

## 2024-08-21 NOTE — PROGRESS NOTE ADULT - ASSESSMENT
This is a 49 year old male with stricturing ileocolonic Crohn's disease (diagnosed about 2 years prior to this presentation, previous exposure to ADA, ileocolic resection with anastomosis about 10 years age) who gastroenterology has been consulted for bowel obstruction. Original concern at time of presentation was for closed loop obstruction, underwent ex-lap and was not found to have signs of closed loop obstruction. Underwent lysis of adhesions and placed on hospital floor. Underwent colonoscopy 8/14 which identified no inflammation in rectum, sigmoid, and descending colon with solid stool in transverse, preventing evaluation of anastomotic stricture. Repeated CTE after NG tube decompression identified persistently dilated loops of small bowel proximal to the ileocolic anastomosis with associated mild neoterminal ileal thickening; additional probable site of disease just proximal to the anastomosis in the right lower abdomen. Findings suggestive of persistent partial small bowel obstruction.    #Acute small bowel obstruction secondary to fibrotic ileocolonic anastomotic stricture  #Stricturing ileocolonic Crohn's disease, endoscopic remission  - Patient tentatively planned for surgical intervention today, 10 days after initial presentation with obstruction  - Discontinue methylprednisolone as patient did not receive benefit from steroids and was found to be in endoscopic remission from Crohn's disease. Anastomotic stricture is fibrotic.  - Please initiate calcium and vitamin D given steroid exposure  - Postoperative care per surgery team  - Patient to discuss initiation of advanced therapy for Crohn's disease with his primary GI as outpatient    Will sign off for now. Please reconsult if needed.     Hector Harkins,   Gastroenterology Fellow  GI Consult Pager Weekdays 7am-5pm: [phone]  Personal Pager Weekdays 7am-5pm: [phone]  Weeknights/Weekend/Holiday Coverage: Please Call the  for Contact Information  Follow Up Questions Welcome via [place] Microsoft Teams Messenger

## 2024-08-21 NOTE — PROGRESS NOTE ADULT - ASSESSMENT
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 for abdominal pain. Patient afebrile, HD normal on presentation without acute laboratory abnormalaties noted. CTAP concerning for closed loop obstruction with two distinct transition points. Now s/p dx lap, lysis of adhesions (8/11). Medicine consulted for indeterminant quantiferon result.    #Crohn's disease  #Indeterminant Quant Result  Patient underwent TB testing per GI in preparation for possible immunosuppressive therapy, currently on methylpred IV 20mg TID  Hep B Ag, Ab, and Core Ab negative  Indeterminant result likely as a result from incorrect collection of specimen, patient has no risk factors for TB, no known exposures.  - cscope showed ileo-colonic anastamosis stricture planned for laparoscopic, possible open, ileocolic resection with GI  - Repeat quant TB specimen received and pending, however skin PPD test was negative - no further action required.  - Rest of care per surgery and GI    Thank you for this consult, medicine will follow.     Case discussed with Dr. Walters 49M with PMHx of Crohn's disease (diagnosed in 2021, not currently on medications) and PSHx of SBR of terminal ileum (10 years ago) who presents to [facility] for abdominal pain. Patient afebrile, HD normal on presentation without acute laboratory abnormalaties noted. CTAP concerning for closed loop obstruction with two distinct transition points. Now s/p dx lap, lysis of adhesions (8/11). Medicine consulted for indeterminant quantiferon result.    #Crohn's disease  #Indeterminant Quant Result  Patient underwent TB testing per GI in preparation for possible immunosuppressive therapy, currently on methylpred IV 20mg TID  Hep B Ag, Ab, and Core Ab negative  Indeterminant result likely as a result from incorrect collection of specimen, patient has no risk factors for TB, no known exposures.  - cscope showed ileo-colonic anastamosis stricture planned for laparoscopic, possible open, ileocolic resection with GI today will f/u with results  - quant TB indeterminate, PPD negative  - Rest of care per surgery and GI    Thank you for this consult, medicine will follow.     Case discussed with Dr. Walters

## 2024-08-21 NOTE — PROGRESS NOTE ADULT - SUBJECTIVE AND OBJECTIVE BOX
INTERVAL HPI/OVERNIGHT EVENTS: YOKO, NPO @ [facility] for OR    STATUS POST:  dx lap, lysis of adhesions    POST OPERATIVE DAY #: 10    SUBJECTIVE:  pt seen at bedside, feeling okay. denies abdominal pain    MEDICATIONS  (STANDING):  chlorhexidine 2% Cloths 1 Application(s) Topical <User Schedule>  dextrose 5% + sodium chloride 0.45%. 1000 milliLiter(s) (80 mL/Hr) IV Continuous <Continuous>  heparin   Injectable 5000 Unit(s) SubCutaneous every 8 hours  lipid, fat emulsion (Fish Oil and Plant Based) 20% Infusion 0.641 Gm/kG/Day (20.8 mL/Hr) IV Continuous <Continuous>  Parenteral Nutrition - Adult 1 Each (63 mL/Hr) TPN Continuous <Continuous>    MEDICATIONS  (PRN):  ondansetron Injectable 4 milliGRAM(s) IV Push every 6 hours PRN Nausea  sodium chloride 0.9% lock flush 10 milliLiter(s) IV Push every 1 hour PRN Pre/post blood products, medications, blood draw, and to maintain line patency      Vital Signs Last 24 Hrs  T(C): 36.6 (21 Aug 2024 05:05), Max: 36.8 (20 Aug 2024 07:55)  T(F): 97.8 (21 Aug 2024 05:05), Max: 98.3 (20 Aug 2024 20:27)  HR: 65 (21 Aug 2024 05:05) (58 - 69)  BP: 123/77 (21 Aug 2024 05:05) (111/66 - 124/72)  BP(mean): 92 (21 Aug 2024 05:05) (83 - 92)  RR: 17 (21 Aug 2024 05:05) (17 - 18)  SpO2: 95% (21 Aug 2024 05:05) (95% - 98%)    Parameters below as of 21 Aug 2024 05:05  Patient On (Oxygen Delivery Method): room air        PHYSICAL EXAM:      Constitutional: A&Ox3    Respiratory: non labored breathing, no respiratory distress    Cardiovascular: NSR, RRR    Gastrointestinal: soft, nontender, mildly distended, incisions c/d/i    Extremities: (-) edema                  I&O's Detail    20 Aug 2024 07:01  -  21 Aug 2024 07:00  --------------------------------------------------------  IN:    dextrose 5% + sodium chloride 0.45%: 800 mL    Fat Emulsion (Fish Oil &amp; Plant Based) 20% Infusion: 249.6 mL    TPN (Total Parenteral Nutrition): 1008 mL  Total IN: 2057.6 mL    OUT:    Voided (mL): 2550 mL  Total OUT: 2550 mL    Total NET: -492.4 mL          LABS:                        13.5   10.28 )-----------( 311      ( 20 Aug 2024 07:36 )             41.0     08-20    139  |  106  |  12  ----------------------------<  127<H>  4.0   |  25  |  0.54    Ca    9.0      20 Aug 2024 07:36  Phos  3.2     08-20  Mg     1.8     08-20      PT/INR - ( 20 Aug 2024 07:36 )   PT: 11.5 sec;   INR: 1.01          PTT - ( 20 Aug 2024 07:36 )  PTT:32.2 sec  Urinalysis Basic - ( 20 Aug 2024 07:36 )    Color: x / Appearance: x / SG: x / pH: x  Gluc: 127 mg/dL / Ketone: x  / Bili: x / Urobili: x   Blood: x / Protein: x / Nitrite: x   Leuk Esterase: x / RBC: x / WBC x   Sq Epi: x / Non Sq Epi: x / Bacteria: x        RADIOLOGY & ADDITIONAL STUDIES:

## 2024-08-21 NOTE — PROGRESS NOTE ADULT - SUBJECTIVE AND OBJECTIVE BOX
Medicine Progress Note (INCOMPLETE)    SUBJECTIVE / OVERNIGHT EVENTS:  O/N events:  Patient was seen and examined at bedside.  Otherwise negative ROS    MEDICATIONS  (STANDING):  lipid, fat emulsion (Fish Oil and Plant Based) 20% Infusion 0.641 Gm/kG/Day (20.8 mL/Hr) IV Continuous <Continuous>  Parenteral Nutrition - Adult 1 Each (63 mL/Hr) TPN Continuous <Continuous>  Parenteral Nutrition - Adult 1 Each (63 mL/Hr) TPN Continuous <Continuous>    MEDICATIONS  (PRN):    CAPILLARY BLOOD GLUCOSE      POCT Blood Glucose.: 104 mg/dL (21 Aug 2024 09:35)        OBJECTIVE:  Vital Signs Last 24 Hrs  T(C): 36.8 (21 Aug 2024 09:54), Max: 36.8 (20 Aug 2024 20:27)  T(F): 98.2 (21 Aug 2024 09:17), Max: 98.3 (20 Aug 2024 20:27)  HR: 75 (21 Aug 2024 09:54) (58 - 75)  BP: 128/75 (21 Aug 2024 09:54) (111/66 - 128/75)  BP(mean): 92 (21 Aug 2024 09:54) (92 - 92)  RR: 18 (21 Aug 2024 09:54) (17 - 18)  SpO2: 98% (21 Aug 2024 09:54) (95% - 98%)    Parameters below as of 21 Aug 2024 05:05  Patient On (Oxygen Delivery Method): room air        PHYSICAL EXAM:  General: AAOx3, NAD, euvolemic  Head: NC/AT; MMM; PERRL  Neck: Supple; no JVD  Respiratory: CTAB; no wheezes/rales/rhonchi  Cardiovascular: Regular rhythm/rate; S1/S2+, no m/r/g auscultated  Gastrointestinal: Soft; NTND; bowel sounds normal and present in all 4 quadrants  :   Extremities: WWP; no b/l U/E edema, no b/l L/E edema  Neurological: CNII-XII grossly intact; no obvious focal deficits  I&O's Summary    20 Aug 2024 07:01  -  21 Aug 2024 07:00  --------------------------------------------------------  IN: 2343.6 mL / OUT: 2550 mL / NET: -206.4 mL        LABS:                        13.9   11.28 )-----------( 312      ( 21 Aug 2024 05:30 )             44.2     08-21    142  |  104  |  11  ----------------------------<  107<H>  3.4<L>   |  28  |  0.69    Ca    9.0      21 Aug 2024 05:30  Phos  3.6     08-21  Mg     2.2     08-21      PT/INR - ( 20 Aug 2024 07:36 )   PT: 11.5 sec;   INR: 1.01          PTT - ( 20 Aug 2024 07:36 )  PTT:32.2 sec      Urinalysis Basic - ( 21 Aug 2024 05:30 )    Color: x / Appearance: x / SG: x / pH: x  Gluc: 107 mg/dL / Ketone: x  / Bili: x / Urobili: x   Blood: x / Protein: x / Nitrite: x   Leuk Esterase: x / RBC: x / WBC x   Sq Epi: x / Non Sq Epi: x / Bacteria: x            RADIOLOGY & ADDITIONAL TESTS:  Imaging from Last 24 Hours: Medicine Progress Note    SUBJECTIVE / OVERNIGHT EVENTS:  O/N events: YOK[place]  Patient was away for   Otherwise negative ROS    MEDICATIONS  (STANDING):  lipid, fat emulsion (Fish Oil and Plant Based) 20% Infusion 0.641 Gm/kG/Day (20.8 mL/Hr) IV Continuous <Continuous>  Parenteral Nutrition - Adult 1 Each (63 mL/Hr) TPN Continuous <Continuous>  Parenteral Nutrition - Adult 1 Each (63 mL/Hr) TPN Continuous <Continuous>    MEDICATIONS  (PRN):    CAPILLARY BLOOD GLUCOSE      POCT Blood Glucose.: 104 mg/dL (21 Aug 2024 09:35)        OBJECTIVE:  Vital Signs Last 24 Hrs  T(C): 36.8 (21 Aug 2024 09:54), Max: 36.8 (20 Aug 2024 20:27)  T(F): 98.2 (21 Aug 2024 09:17), Max: 98.3 (20 Aug 2024 20:27)  HR: 75 (21 Aug 2024 09:54) (58 - 75)  BP: 128/75 (21 Aug 2024 09:54) (111/66 - 128/75)  BP(mean): 92 (21 Aug 2024 09:54) (92 - 92)  RR: 18 (21 Aug 2024 09:54) (17 - 18)  SpO2: 98% (21 Aug 2024 09:54) (95% - 98%)    Parameters below as of 21 Aug 2024 05:05  Patient On (Oxygen Delivery Method): room air        PHYSICAL EXAM:  General: AAOx3, NAD, euvolemic  Head: NC/AT; MMM; PERRL  Neck: Supple; no JVD  Respiratory: CTAB; no wheezes/rales/rhonchi  Cardiovascular: Regular rhythm/rate; S1/S2+, no m/r/g auscultated  Gastrointestinal: Soft; NTND; incisions CDI bowel sounds normal and present in all 4 quadrants  :   Extremities: WWP; no b/l U/E edema, no b/l L/E edema  Neurological: CNII-XII grossly intact; no obvious focal deficits  I&O's Summary    20 Aug 2024 07:01  -  21 Aug 2024 07:00  --------------------------------------------------------  IN: 2343.6 mL / OUT: 2550 mL / NET: -206.4 mL        LABS:                        13.9   11.28 )-----------( 312      ( 21 Aug 2024 05:30 )             44.2     08-21    142  |  104  |  11  ----------------------------<  107<H>  3.4<L>   |  28  |  0.69    Ca    9.0      21 Aug 2024 05:30  Phos  3.6     08-21  Mg     2.2     08-21      PT/INR - ( 20 Aug 2024 07:36 )   PT: 11.5 sec;   INR: 1.01          PTT - ( 20 Aug 2024 07:36 )  PTT:32.2 sec      Urinalysis Basic - ( 21 Aug 2024 05:30 )    Color: x / Appearance: x / SG: x / pH: x  Gluc: 107 mg/dL / Ketone: x  / Bili: x / Urobili: x   Blood: x / Protein: x / Nitrite: x   Leuk Esterase: x / RBC: x / WBC x   Sq Epi: x / Non Sq Epi: x / Bacteria: x            RADIOLOGY & ADDITIONAL TESTS:  Imaging from Last 24 Hours:

## 2024-08-21 NOTE — PROGRESS NOTE ADULT - ATTENDING COMMENTS
Patient seen, examined, and discussed with Dr. Harkins. Agree with above. 49M with a h/o stricturing ileocolonic Crohn's disease (diagnosed about 2 years prior to this presentation, previous exposure to ADA, ileocolic resection with anastomosis about 10 years age) who gastroenterology has been consulted for bowel obstruction. Original concern at time of presentation was for closed loop obstruction, underwent ex-lap and was not found to have signs of closed loop obstruction. Underwent lysis of adhesions and placed on hospital floor. Underwent colonoscopy 8/14 which identified no inflammation in rectum, sigmoid, and descending colon with solid stool in transverse, preventing evaluation of anastomotic stricture. Repeated CTE after NG tube decompression identified persistently dilated loops of small bowel proximal to the ileocolic anastomosis with associated mild neoterminal ileal thickening; additional probable site of disease just proximal to the anastomosis in the right lower abdomen. Findings suggestive of persistent partial small bowel obstruction. Now s/p OR with resection of strictured anastomosis. Will sign off, please call back with questions or concerns.     Vasu Magallanes MD  Gastroenterology

## 2024-08-21 NOTE — PRE-ANESTHESIA EVALUATION ADULT - NSPREOPDXFT_GEN_ALL_CORE
bowel obstruction
Acute small bowel obstruction secondary to ileocolonic anastomotic stricture
Crohn's Disease
possible small bowel obstruction

## 2024-08-21 NOTE — PRE-ANESTHESIA EVALUATION ADULT - NSPROPOSEDPROCEDFT_GEN_ALL_CORE
Colon Resection
colonoscopy
colonoscopy
diagnostic laparoscopy, possible small bowel resection, possible exploratory laparotomy

## 2024-08-21 NOTE — PROGRESS NOTE ADULT - ASSESSMENT
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 for abdominal pain. Patient afebrile, HD normal on presentation without acute laboratory abnormalaties noted. CTAP concerning for closed loop obstruction with two distinct transition points. Now s/p dx lap, lysis of adhesions (8/11). Colonoscopy 8/20 significant for ileo-colonic anastamosis stricture. Now s/p Excision of part of colon and excision of terminal ileum with ileocolic anastomosis    NPO  PICC/TPN  Pain/nausea control PRN  GI Consult - 20 mg Methylprednisolone TID (currently holding)  HSQ/SCDs/IS/OOBA  [place]   AM labs 49M with PMHx of Crohn's disease (diagnosed in 2021, not currently on medications) and PSHx of SBR of terminal ileum (10 years ago) who presents to [facility] for abdominal pain. Patient afebrile, HD normal on presentation without acute laboratory abnormalaties noted. CTAP concerning for closed loop obstruction with two distinct transition points. Now s/p dx lap, lysis of adhesions (8/11). Colonoscopy 8/20 significant for ileo-colonic anastamosis stricture. Now s/p ileocolic resection of prior anastomosis    NPO  PICC/TPN  Pain/nausea control PRN  GI Consult - 20 mg Methylprednisolone TID (currently holding)  HSQ/SCDs/IS/OOBA  [place]   AM labs

## 2024-08-21 NOTE — PRE-ANESTHESIA EVALUATION ADULT - NSANTHPEFT_GEN_ALL_CORE
Heart and Lungs NAD
WNL
General: Appearance is consistent with chronological age. No abnormal facies.  EENT: Anicteric sclera; oropharynx clear, moist mucus membranes  Cardiovascular:  Rate and rhythm evaluated  Respiratory: Unlabored breathing  Neurological: Awake and alert, moves all extremities  Constitutional: MET>4
General: Appearance is consistent with chronological age. No abnormal facies.  Airway:  See Mallampati score  EENT: Anicteric sclera; oropharynx clear, moist mucus membranes  Cardiovascular:  Regular rate and rhythm  Respiratory: Unlabored breathing  Neurological: Awake and alert, moves all extremities  Constitutional: MET>4

## 2024-08-21 NOTE — BRIEF OPERATIVE NOTE - OPERATION/FINDINGS
Stenotic ileo-colonic anastomosis
Optiview LUQ. Dx lap with dilated small bowel identified proximal to previous ileocolic resection. Firm mass vs inflammation at anastomosis appreciated. Bowel run with smooth taper from dilated to collapsed bowel, no pathologic TP. Pelvic ascites aspirated. Abdomen desufflated, skin closed with monocryl.
Crohn's disease with fat creeping of prior ileocolic anastomosis. Resection performed and small bowel to transverse colon side-to-side stapled anastomosis performed.  Redundancy of sigmoid colon with sigmoid colon extending to right upper quadrant.

## 2024-08-21 NOTE — PRE-ANESTHESIA EVALUATION ADULT - NSANTHOSAYNRD_GEN_A_CORE
No. JAYE screening performed.  STOP BANG Legend: 0-2 = LOW Risk; 3-4 = INTERMEDIATE Risk; 5-8 = HIGH Risk

## 2024-08-21 NOTE — BRIEF OPERATIVE NOTE - NSICDXBRIEFPREOP_GEN_ALL_CORE_FT
PRE-OP DIAGNOSIS:  Bowel obstruction 11-Aug-2024 09:54:52  Reji Chavez  Crohn disease 21-Aug-2024 15:00:55  Dotty Cloud  
PRE-OP DIAGNOSIS:  Bowel obstruction 11-Aug-2024 09:54:52  Reji Chavez  
PRE-OP DIAGNOSIS:  Bowel obstruction 11-Aug-2024 09:54:52  Reji Chavez  Crohn disease 21-Aug-2024 15:00:55  Dotty Cloud

## 2024-08-21 NOTE — BRIEF OPERATIVE NOTE - NSICDXBRIEFPOSTOP_GEN_ALL_CORE_FT
POST-OP DIAGNOSIS:  Bowel obstruction 11-Aug-2024 09:55:04  Reji Chavez  
POST-OP DIAGNOSIS:  Bowel obstruction 11-Aug-2024 09:55:04  Reji Chavez  Crohn disease 21-Aug-2024 15:01:05  Dotty Cloud  
POST-OP DIAGNOSIS:  Bowel obstruction 11-Aug-2024 09:55:04  Reji Chavez  Crohn disease 21-Aug-2024 15:01:05  Dotty Cloud

## 2024-08-21 NOTE — BRIEF OPERATIVE NOTE - NSICDXBRIEFPROCEDURE_GEN_ALL_CORE_FT
PROCEDURES:  Diagnostic laparoscopy 11-Aug-2024 09:54:14  Reji Chavez  Lysis of adhesions of peritoneum 11-Aug-2024 09:54:44  Reji Chavez  
PROCEDURES:  Diagnostic laparoscopy 11-Aug-2024 09:54:14  Reji Chavez  Excision of part of colon and excision of terminal ileum with ileocolic anastomosis 21-Aug-2024 15:00:31  Dotty Colud  
PROCEDURES:  Diagnostic laparoscopy 11-Aug-2024 09:54:14  Reji Chavez  Lysis of adhesions of peritoneum 11-Aug-2024 09:54:44  Reji Chavez  Excision of part of colon and excision of terminal ileum with ileocolic anastomosis 21-Aug-2024 15:00:31  Dotty Cloud

## 2024-08-21 NOTE — PROGRESS NOTE ADULT - SUBJECTIVE AND OBJECTIVE BOX
SUBJECTIVE: Pt doing well post operatively. Endorses some nausea but has not vomited. Abdominal pain around incisional site. Denies Chest pain, respiratory distress.      MEDICATIONS  (STANDING):  heparin   Injectable 5000 Unit(s) SubCutaneous every 8 hours  lipid, fat emulsion (Fish Oil and Plant Based) 20% Infusion 0.641 Gm/kG/Day (20.8 mL/Hr) IV Continuous <Continuous>  Parenteral Nutrition - Adult 1 Each (63 mL/Hr) TPN Continuous <Continuous>  Parenteral Nutrition - Adult 1 Each (63 mL/Hr) TPN Continuous <Continuous>    MEDICATIONS  (PRN):  HYDROmorphone  Injectable 0.25 milliGRAM(s) IV Push every 6 hours PRN Moderate Pain (4 - 6)  HYDROmorphone  Injectable 0.5 milliGRAM(s) IV Push every 6 hours PRN Severe Pain (7 - 10)  ondansetron Injectable 4 milliGRAM(s) IV Push every 6 hours PRN Nausea      Vital Signs Last 24 Hrs  T(C): 36.8 (21 Aug 2024 16:47), Max: 36.9 (21 Aug 2024 15:54)  T(F): 98.2 (21 Aug 2024 16:47), Max: 98.5 (21 Aug 2024 15:54)  HR: 97 (21 Aug 2024 16:47) (58 - 97)  BP: 143/84 (21 Aug 2024 16:47) (114/63 - 143/84)  BP(mean): 102 (21 Aug 2024 16:09) (90 - 109)  RR: 17 (21 Aug 2024 16:47) (10 - 18)  SpO2: 97% (21 Aug 2024 16:47) (95% - 99%)    Parameters below as of 21 Aug 2024 16:47  Patient On (Oxygen Delivery Method): room air        PHYSICAL EXAM:      Constitutional: A&Ox3    Respiratory: non labored breathing, no respiratory distress    Cardiovascular: NSR, RRR    Gastrointestinal: soft, ND, tender to palpation around incisional sites                 Incision: C/D/I    Genitourinary: [place] Catheter    Extremities: (-) edema                  I&O's Detail    20 Aug 2024 07:01  -  21 Aug 2024 07:00  --------------------------------------------------------  IN:    dextrose 5% + sodium chloride 0.45%: 960 mL    Fat Emulsion (Fish Oil &amp; Plant Based) 20% Infusion: 249.6 mL    TPN (Total Parenteral Nutrition): 1134 mL  Total IN: 2343.6 mL    OUT:    Voided (mL): 2550 mL  Total OUT: 2550 mL    Total NET: -206.4 mL      21 Aug 2024 07:01  -  21 Aug 2024 16:57  --------------------------------------------------------  IN:  Total IN: 0 mL    OUT:    Indwelling Catheter - Urethral (mL): 100 mL  Total OUT: 100 mL    Total NET: -100 mL          LABS:                        13.9   11.28 )-----------( 312      ( 21 Aug 2024 05:30 )             44.2     08-21    142  |  104  |  11  ----------------------------<  107<H>  3.4<L>   |  28  |  0.69    Ca    9.0      21 Aug 2024 05:30  Phos  3.6     08-21  Mg     2.2     08-21      PT/INR - ( 20 Aug 2024 07:36 )   PT: 11.5 sec;   INR: 1.01          PTT - ( 20 Aug 2024 07:36 )  PTT:32.2 sec  Urinalysis Basic - ( 21 Aug 2024 05:30 )    Color: x / Appearance: x / SG: x / pH: x  Gluc: 107 mg/dL / Ketone: x  / Bili: x / Urobili: x   Blood: x / Protein: x / Nitrite: x   Leuk Esterase: x / RBC: x / WBC x   Sq Epi: x / Non Sq Epi: x / Bacteria: x        RADIOLOGY & ADDITIONAL STUDIES:

## 2024-08-22 LAB
ANION GAP SERPL CALC-SCNC: 11 MMOL/L — SIGNIFICANT CHANGE UP (ref 5–17)
BUN SERPL-MCNC: 14 MG/DL — SIGNIFICANT CHANGE UP (ref 7–23)
CALCIUM SERPL-MCNC: 8.9 MG/DL — SIGNIFICANT CHANGE UP (ref 8.4–10.5)
CHLORIDE SERPL-SCNC: 99 MMOL/L — SIGNIFICANT CHANGE UP (ref 96–108)
CO2 SERPL-SCNC: 25 MMOL/L — SIGNIFICANT CHANGE UP (ref 22–31)
CREAT SERPL-MCNC: 0.66 MG/DL — SIGNIFICANT CHANGE UP (ref 0.5–1.3)
EGFR: 116 ML/MIN/1.73M2 — SIGNIFICANT CHANGE UP
GLUCOSE SERPL-MCNC: 159 MG/DL — HIGH (ref 70–99)
HCT VFR BLD CALC: 49.8 % — SIGNIFICANT CHANGE UP (ref 39–50)
HGB BLD-MCNC: 16.3 G/DL — SIGNIFICANT CHANGE UP (ref 13–17)
MAGNESIUM SERPL-MCNC: 2 MG/DL — SIGNIFICANT CHANGE UP (ref 1.6–2.6)
MCHC RBC-ENTMCNC: 27.7 PG — SIGNIFICANT CHANGE UP (ref 27–34)
MCHC RBC-ENTMCNC: 32.7 GM/DL — SIGNIFICANT CHANGE UP (ref 32–36)
MCV RBC AUTO: 84.7 FL — SIGNIFICANT CHANGE UP (ref 80–100)
NRBC # BLD: 0 /100 WBCS — SIGNIFICANT CHANGE UP (ref 0–0)
PHOSPHATE SERPL-MCNC: 3.6 MG/DL — SIGNIFICANT CHANGE UP (ref 2.5–4.5)
PLATELET # BLD AUTO: 346 K/UL — SIGNIFICANT CHANGE UP (ref 150–400)
POTASSIUM SERPL-MCNC: 4 MMOL/L — SIGNIFICANT CHANGE UP (ref 3.5–5.3)
POTASSIUM SERPL-SCNC: 4 MMOL/L — SIGNIFICANT CHANGE UP (ref 3.5–5.3)
RBC # BLD: 5.88 M/UL — HIGH (ref 4.2–5.8)
RBC # FLD: 14.2 % — SIGNIFICANT CHANGE UP (ref 10.3–14.5)
SODIUM SERPL-SCNC: 135 MMOL/L — SIGNIFICANT CHANGE UP (ref 135–145)
WBC # BLD: 22 K/UL — HIGH (ref 3.8–10.5)
WBC # FLD AUTO: 22 K/UL — HIGH (ref 3.8–10.5)

## 2024-08-22 PROCEDURE — 99232 SBSQ HOSP IP/OBS MODERATE 35: CPT

## 2024-08-22 RX ORDER — ACETAMINOPHEN 325 MG/1
1000 TABLET ORAL ONCE
Refills: 0 | Status: COMPLETED | OUTPATIENT
Start: 2024-08-22 | End: 2024-08-22

## 2024-08-22 RX ORDER — I.V. FAT EMULSION 20 G/100ML
0.64 EMULSION INTRAVENOUS
Qty: 50 | Refills: 0 | Status: DISCONTINUED | OUTPATIENT
Start: 2024-08-22 | End: 2024-08-22

## 2024-08-22 RX ORDER — ACETAMINOPHEN 325 MG/1
1000 TABLET ORAL ONCE
Refills: 0 | Status: COMPLETED | OUTPATIENT
Start: 2024-08-23 | End: 2024-08-23

## 2024-08-22 RX ORDER — ACETAMINOPHEN 325 MG/1
1000 TABLET ORAL ONCE
Refills: 0 | Status: COMPLETED | OUTPATIENT
Start: 2024-08-23 | End: 2024-08-22

## 2024-08-22 RX ORDER — ELECTROLYTE SOLUTION,INJ
1 VIAL (ML) INTRAVENOUS
Refills: 0 | Status: DISCONTINUED | OUTPATIENT
Start: 2024-08-22 | End: 2024-08-22

## 2024-08-22 RX ORDER — CHLORHEXIDINE GLUCONATE 40 MG/ML
1 SOLUTION TOPICAL
Refills: 0 | Status: DISCONTINUED | OUTPATIENT
Start: 2024-08-22 | End: 2024-08-27

## 2024-08-22 RX ORDER — ONDANSETRON 2 MG/ML
4 INJECTION, SOLUTION INTRAMUSCULAR; INTRAVENOUS ONCE
Refills: 0 | Status: COMPLETED | OUTPATIENT
Start: 2024-08-22 | End: 2024-08-22

## 2024-08-22 RX ADMIN — HYDROMORPHONE HYDROCHLORIDE 0.5 MILLIGRAM(S): 2 TABLET ORAL at 04:30

## 2024-08-22 RX ADMIN — ACETAMINOPHEN 400 MILLIGRAM(S): 325 TABLET ORAL at 18:35

## 2024-08-22 RX ADMIN — Medication 1 EACH: at 18:33

## 2024-08-22 RX ADMIN — Medication 120 MILLILITER(S): at 07:20

## 2024-08-22 RX ADMIN — ONDANSETRON 4 MILLIGRAM(S): 2 INJECTION, SOLUTION INTRAMUSCULAR; INTRAVENOUS at 02:38

## 2024-08-22 RX ADMIN — Medication 5000 UNIT(S): at 23:45

## 2024-08-22 RX ADMIN — Medication 5000 UNIT(S): at 16:29

## 2024-08-22 RX ADMIN — Medication 40 MILLILITER(S): at 12:03

## 2024-08-22 RX ADMIN — Medication 5000 UNIT(S): at 07:02

## 2024-08-22 RX ADMIN — ONDANSETRON 4 MILLIGRAM(S): 2 INJECTION, SOLUTION INTRAMUSCULAR; INTRAVENOUS at 06:11

## 2024-08-22 RX ADMIN — ACETAMINOPHEN 400 MILLIGRAM(S): 325 TABLET ORAL at 12:03

## 2024-08-22 RX ADMIN — HYDROMORPHONE HYDROCHLORIDE 0.5 MILLIGRAM(S): 2 TABLET ORAL at 03:56

## 2024-08-22 RX ADMIN — ACETAMINOPHEN 400 MILLIGRAM(S): 325 TABLET ORAL at 23:45

## 2024-08-22 RX ADMIN — I.V. FAT EMULSION 20.83 GM/KG/DAY: 20 EMULSION INTRAVENOUS at 18:33

## 2024-08-22 NOTE — PROGRESS NOTE ADULT - SUBJECTIVE AND OBJECTIVE BOX
Medicine Progress Note (INCOMPLETE)    SUBJECTIVE / OVERNIGHT EVENTS:  O/N events:  Patient was seen and examined at bedside.  Otherwise negative ROS    MEDICATIONS  (STANDING):  chlorhexidine 2% Cloths 1 Application(s) Topical <User Schedule>  heparin   Injectable 5000 Unit(s) SubCutaneous every 8 hours  lactated ringers. 1000 milliLiter(s) (40 mL/Hr) IV Continuous <Continuous>  lipid, fat emulsion (Fish Oil and Plant Based) 20% Infusion 0.641 Gm/kG/Day (20.8 mL/Hr) IV Continuous <Continuous>  lipid, fat emulsion (Fish Oil and Plant Based) 20% Infusion 0.641 Gm/kG/Day (20.8 mL/Hr) IV Continuous <Continuous>  Parenteral Nutrition - Adult 1 Each (63 mL/Hr) TPN Continuous <Continuous>  Parenteral Nutrition - Adult 1 Each (63 mL/Hr) TPN Continuous <Continuous>    MEDICATIONS  (PRN):  HYDROmorphone  Injectable 0.25 milliGRAM(s) IV Push every 6 hours PRN Moderate Pain (4 - 6)  HYDROmorphone  Injectable 0.5 milliGRAM(s) IV Push every 6 hours PRN Severe Pain (7 - 10)  ondansetron Injectable 4 milliGRAM(s) IV Push every 6 hours PRN Nausea    CAPILLARY BLOOD GLUCOSE            OBJECTIVE:  Vital Signs Last 24 Hrs  T(C): 36.4 (22 Aug 2024 09:02), Max: 37.5 (22 Aug 2024 00:10)  T(F): 97.5 (22 Aug 2024 09:02), Max: 99.5 (22 Aug 2024 00:10)  HR: 91 (22 Aug 2024 09:02) (64 - 97)  BP: 138/80 (22 Aug 2024 09:02) (118/75 - 143/84)  BP(mean): 97 (22 Aug 2024 05:11) (90 - 109)  RR: 17 (22 Aug 2024 09:02) (10 - 18)  SpO2: 98% (22 Aug 2024 09:02) (97% - 99%)    Parameters below as of 22 Aug 2024 09:02  Patient On (Oxygen Delivery Method): room air        PHYSICAL EXAM:  General: AAOx3, NAD, euvolemic  Head: NC/AT; MMM; PERRL  Neck: Supple; no JVD  Respiratory: CTAB; no wheezes/rales/rhonchi  Cardiovascular: Regular rhythm/rate; S1/S2+, no m/r/g auscultated  Gastrointestinal: Soft; NTND; bowel sounds normal and present in all 4 quadrants  :   Extremities: WWP; no b/l U/E edema, no b/l L/E edema  Neurological: CNII-XII grossly intact; no obvious focal deficits  I&O's Summary    21 Aug 2024 07:01  -  22 Aug 2024 07:00  --------------------------------------------------------  IN: 1005.6 mL / OUT: 1250 mL / NET: -244.4 mL        LABS:                        16.3   22.00 )-----------( 346      ( 22 Aug 2024 07:31 )             49.8     08-22    135  |  99  |  14  ----------------------------<  159<H>  4.0   |  25  |  0.66    Ca    8.9      22 Aug 2024 07:31  Phos  3.6     08-22  Mg     2.0     08-22            Urinalysis Basic - ( 22 Aug 2024 07:31 )    Color: x / Appearance: x / SG: x / pH: x  Gluc: 159 mg/dL / Ketone: x  / Bili: x / Urobili: x   Blood: x / Protein: x / Nitrite: x   Leuk Esterase: x / RBC: x / WBC x   Sq Epi: x / Non Sq Epi: x / Bacteria: x            RADIOLOGY & ADDITIONAL TESTS:  Imaging from Last 24 Hours: Medicine Progress Note     SUBJECTIVE / OVERNIGHT EVENTS:  Patient was seen and examined at bedside reporting severe gas pains that are worse when he lies in bed improved in chair. Reports he has not able to pass gas yet feels severe gas pain that feel stuck in his throat. Denies n/v, abdomen tender to palpation.  Otherwise negative ROS    MEDICATIONS  (STANDING):  chlorhexidine 2% Cloths 1 Application(s) Topical <User Schedule>  heparin   Injectable 5000 Unit(s) SubCutaneous every 8 hours  lactated ringers. 1000 milliLiter(s) (40 mL/Hr) IV Continuous <Continuous>  lipid, fat emulsion (Fish Oil and Plant Based) 20% Infusion 0.641 Gm/kG/Day (20.8 mL/Hr) IV Continuous <Continuous>  lipid, fat emulsion (Fish Oil and Plant Based) 20% Infusion 0.641 Gm/kG/Day (20.8 mL/Hr) IV Continuous <Continuous>  Parenteral Nutrition - Adult 1 Each (63 mL/Hr) TPN Continuous <Continuous>  Parenteral Nutrition - Adult 1 Each (63 mL/Hr) TPN Continuous <Continuous>    MEDICATIONS  (PRN):  HYDROmorphone  Injectable 0.25 milliGRAM(s) IV Push every 6 hours PRN Moderate Pain (4 - 6)  HYDROmorphone  Injectable 0.5 milliGRAM(s) IV Push every 6 hours PRN Severe Pain (7 - 10)  ondansetron Injectable 4 milliGRAM(s) IV Push every 6 hours PRN Nausea    CAPILLARY BLOOD GLUCOSE            OBJECTIVE:  Vital Signs Last 24 Hrs  T(C): 36.4 (22 Aug 2024 09:02), Max: 37.5 (22 Aug 2024 00:10)  T(F): 97.5 (22 Aug 2024 09:02), Max: 99.5 (22 Aug 2024 00:10)  HR: 91 (22 Aug 2024 09:02) (64 - 97)  BP: 138/80 (22 Aug 2024 09:02) (118/75 - 143/84)  BP(mean): 97 (22 Aug 2024 05:11) (90 - 109)  RR: 17 (22 Aug 2024 09:02) (10 - 18)  SpO2: 98% (22 Aug 2024 09:02) (97% - 99%)    Parameters below as of 22 Aug 2024 09:02  Patient On (Oxygen Delivery Method): room air        PHYSICAL EXAM:  General: AAOx3, NAD, euvolemic  Head: NC/AT; MMM; PERRL  Neck: Supple; no JVD  Respiratory: CTAB; no wheezes/rales/rhonchi  Cardiovascular: Regular rhythm/rate; S1/S2+, no m/r/g auscultated  Gastrointestinal: Soft; diffusely TTP, ND; hypoactive bowel sounds in all 4 quadrants, midline scar cdi  Extremities: WWP; no b/l U/E edema, no b/l L/E edema  Neurological: CNII-XII grossly intact; no obvious focal deficits  I&O's Summary    21 Aug 2024 07:01  -  22 Aug 2024 07:00  --------------------------------------------------------  IN: 1005.6 mL / OUT: 1250 mL / NET: -244.4 mL        LABS:                        16.3   22.00 )-----------( 346      ( 22 Aug 2024 07:31 )             49.8     08-22    135  |  99  |  14  ----------------------------<  159<H>  4.0   |  25  |  0.66    Ca    8.9      22 Aug 2024 07:31  Phos  3.6     08-22  Mg     2.0     08-22            Urinalysis Basic - ( 22 Aug 2024 07:31 )    Color: x / Appearance: x / SG: x / pH: x  Gluc: 159 mg/dL / Ketone: x  / Bili: x / Urobili: x   Blood: x / Protein: x / Nitrite: x   Leuk Esterase: x / RBC: x / WBC x   Sq Epi: x / Non Sq Epi: x / Bacteria: x            RADIOLOGY & ADDITIONAL TESTS:  Imaging from Last 24 Hours:

## 2024-08-22 NOTE — PROGRESS NOTE ADULT - ATTENDING COMMENTS
s/p ileocolic resection of prior anastomosis POD1  reports nausea on clear liquids  denies flatus or bm  c/o incisional pain  afebrile  Abdomen softly distended  NPO, TPN, await GI function  multimodality pain control  OOB, ambulate  dc beck

## 2024-08-22 NOTE — PROGRESS NOTE ADULT - ASSESSMENT
49M with PMHx of Crohn's disease (diagnosed in 2021, not currently on medications) and PSHx of SBR of terminal ileum (10 years ago) who presents to [facility] for abdominal pain. Patient afebrile, HD normal on presentation without acute laboratory abnormalaties noted. CTAP concerning for closed loop obstruction with two distinct transition points. Now s/p dx lap, lysis of adhesions (8/11). Medicine consulted for indeterminant quantiferon result.    #Crohn's disease  #Indeterminant Quant Result  Patient underwent TB testing per GI in preparation for possible immunosuppressive therapy, currently on methylpred IV 20mg TID  Hep B Ag, Ab, and Core Ab negative  Indeterminant result likely as a result from incorrect collection of specimen, patient has no risk factors for TB, no known exposures.  - cscope showed ileo-colonic anastamosis stricture planned for laparoscopic, possible open, ileocolic resection with GI 8/22 with stapled anastamosis, maintain on TPN. F/u nutrition recs  - Repeat quant TB specimen received and pending, however skin PPD test was negative - no further action required.  - Rest of care per surgery and GI    Thank you for this consult, medicine will follow.     Case discussed with Dr. Walters 49M with PMHx of Crohn's disease (diagnosed in 2021, not currently on medications) and PSHx of SBR of terminal ileum (10 years ago) who presents to [facility] for abdominal pain. Patient afebrile, HD normal on presentation without acute laboratory abnormalaties noted. CTAP concerning for closed loop obstruction with two distinct transition points. Now s/p dx lap, lysis of adhesions (8/11). Medicine consulted for indeterminant quantiferon result.    #Crohn's disease w/ ileocolonic anastamosis stricture sp dx lap with lysis of adhesions (8/11), ileocolonic anastamosis revision (small bowel to transverse colon) 8/21  #Indeterminant Quant Result  Patient underwent TB testing per GI in preparation for possible immunosuppressive therapy, currently on methylpred IV 20mg TID  Hep B Ag, Ab, and Core Ab negative  Indeterminant result likely as a result from incorrect collection of specimen, patient has no risk factors for TB, no known exposures.  - cscope showed ileo-colonic anastamosis stricture planned for laparoscopic, possible open, ileocolic resection with GI 8/22 with stapled anastamosis, maintain on TPN. F/u nutrition recs  - noted to be on dilaudid pain regimen, would recommend ofirmev x1   - pt has not passed gas yet- would recommend OOBTC as tolerated  - Repeat quant TB specimen received and pending, however skin PPD test was negative - no further action required.  - Rest of care per surgery and GI    Thank you for this consult, medicine will follow.     Case discussed with Dr. Walters

## 2024-08-22 NOTE — PROGRESS NOTE ADULT - ASSESSMENT
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 Patient afebrile, HD normal on presentation without acute laboratory abnormalaties noted. CTAP concerning for closed loop obstruction with two distinct transition points. Now s/p dx lap, lysis of adhesions (8/11). Colonoscpy 8/20 significant for ileo-colonic anastamosis stricture.     DC beck, pending TOV   NPO/IVF  PICC/TPN  Pain/nausea control PRN  GI Consult - 20 mg Methylprednisolone TID (currently holding)  HSQ/SCDs/IS/OOBA  AM labs

## 2024-08-22 NOTE — PROGRESS NOTE ADULT - SUBJECTIVE AND OBJECTIVE BOX
POST-OP DAY: #11 s/p diagnostic lap, MEL; #1 s/p ex lap, ileocolic anastomosis      SUBJECTIVE: Patient seen and examined bedside by chief resident on AM rounds. Patient admits to worsening abdominal pain. Patient reports drinking 2 water bottles overnight. Denies any nausea/vomiting. -BF.     heparin   Injectable 5000 Unit(s) SubCutaneous every 8 hours    MEDICATIONS  (PRN):  HYDROmorphone  Injectable 0.25 milliGRAM(s) IV Push every 6 hours PRN Moderate Pain (4 - 6)  HYDROmorphone  Injectable 0.5 milliGRAM(s) IV Push every 6 hours PRN Severe Pain (7 - 10)  ondansetron Injectable 4 milliGRAM(s) IV Push every 6 hours PRN Nausea      I&O's Detail    21 Aug 2024 07:01  -  22 Aug 2024 07:00  --------------------------------------------------------  IN:    Fat Emulsion (Fish Oil &amp; Plant Based) 20% Infusion: 249.6 mL    TPN (Total Parenteral Nutrition): 756 mL  Total IN: 1005.6 mL    OUT:    Indwelling Catheter - Urethral (mL): 1250 mL  Total OUT: 1250 mL    Total NET: -244.4 mL          Vital Signs Last 24 Hrs  T(C): 37.1 (22 Aug 2024 05:11), Max: 37.5 (22 Aug 2024 00:10)  T(F): 98.7 (22 Aug 2024 05:11), Max: 99.5 (22 Aug 2024 00:10)  HR: 87 (22 Aug 2024 05:11) (64 - 97)  BP: 125/83 (22 Aug 2024 05:11) (118/75 - 143/84)  BP(mean): 97 (22 Aug 2024 05:11) (90 - 109)  RR: 17 (22 Aug 2024 05:11) (10 - 18)  SpO2: 97% (22 Aug 2024 05:11) (97% - 99%)    Parameters below as of 22 Aug 2024 05:11  Patient On (Oxygen Delivery Method): room air      General: NAD, resting comfortably in bed  Pulm: Nonlabored breathing, no respiratory distress  Abd: soft, mild distension, diffuse TTP   Extrem: WWP, no edema, SCDs in place    LABS:                        16.3   22.00 )-----------( 346      ( 22 Aug 2024 07:31 )             49.8     08-22    135  |  99  |  14  ----------------------------<  159<H>  4.0   |  25  |  0.66    Ca    8.9      22 Aug 2024 07:31  Phos  3.6     08-22  Mg     2.0     08-22        Urinalysis Basic - ( 22 Aug 2024 07:31 )    Color: x / Appearance: x / SG: x / pH: x  Gluc: 159 mg/dL / Ketone: x  / Bili: x / Urobili: x   Blood: x / Protein: x / Nitrite: x   Leuk Esterase: x / RBC: x / WBC x   Sq Epi: x / Non Sq Epi: x / Bacteria: x        RADIOLOGY & ADDITIONAL STUDIES:     POST-OP DAY: #11 s/p diagnostic lap, MEL; #1 s/p ex lap, ileocolic resection     SUBJECTIVE: Patient seen and examined bedside by chief resident on AM rounds. Patient admits to worsening abdominal pain. Patient reports drinking 2 water bottles overnight. Denies any nausea/vomiting. -BF.     heparin   Injectable 5000 Unit(s) SubCutaneous every 8 hours    MEDICATIONS  (PRN):  HYDROmorphone  Injectable 0.25 milliGRAM(s) IV Push every 6 hours PRN Moderate Pain (4 - 6)  HYDROmorphone  Injectable 0.5 milliGRAM(s) IV Push every 6 hours PRN Severe Pain (7 - 10)  ondansetron Injectable 4 milliGRAM(s) IV Push every 6 hours PRN Nausea      I&O's Detail    21 Aug 2024 07:01  -  22 Aug 2024 07:00  --------------------------------------------------------  IN:    Fat Emulsion (Fish Oil &amp; Plant Based) 20% Infusion: 249.6 mL    TPN (Total Parenteral Nutrition): 756 mL  Total IN: 1005.6 mL    OUT:    Indwelling Catheter - Urethral (mL): 1250 mL  Total OUT: 1250 mL    Total NET: -244.4 mL          Vital Signs Last 24 Hrs  T(C): 37.1 (22 Aug 2024 05:11), Max: 37.5 (22 Aug 2024 00:10)  T(F): 98.7 (22 Aug 2024 05:11), Max: 99.5 (22 Aug 2024 00:10)  HR: 87 (22 Aug 2024 05:11) (64 - 97)  BP: 125/83 (22 Aug 2024 05:11) (118/75 - 143/84)  BP(mean): 97 (22 Aug 2024 05:11) (90 - 109)  RR: 17 (22 Aug 2024 05:11) (10 - 18)  SpO2: 97% (22 Aug 2024 05:11) (97% - 99%)    Parameters below as of 22 Aug 2024 05:11  Patient On (Oxygen Delivery Method): room air      General: NAD, resting comfortably in bed  Pulm: Nonlabored breathing, no respiratory distress  Abd: soft, mild distension, diffuse TTP   Extrem: WWP, no edema, SCDs in place    LABS:                        16.3   22.00 )-----------( 346      ( 22 Aug 2024 07:31 )             49.8     08-22    135  |  99  |  14  ----------------------------<  159<H>  4.0   |  25  |  0.66    Ca    8.9      22 Aug 2024 07:31  Phos  3.6     08-22  Mg     2.0     08-22        Urinalysis Basic - ( 22 Aug 2024 07:31 )    Color: x / Appearance: x / SG: x / pH: x  Gluc: 159 mg/dL / Ketone: x  / Bili: x / Urobili: x   Blood: x / Protein: x / Nitrite: x   Leuk Esterase: x / RBC: x / WBC x   Sq Epi: x / Non Sq Epi: x / Bacteria: x        RADIOLOGY & ADDITIONAL STUDIES:

## 2024-08-22 NOTE — PROGRESS NOTE ADULT - ATTENDING COMMENTS
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 for abdominal pain. Patient afebrile, HD normal on presentation without acute laboratory abnormalaties noted. CTAP concerning for closed loop obstruction with two distinct transition points. Now s/p dx lap, lysis of adhesions (8/11). Medicine consulted for indeterminant quantiferon result,   intervally - started on TPN 8/16, CT enterography 8/16-reviewed- with possible partial sbo, colonoscopy 8/20- stricture ,   sp lap-, Excision of part of colon and excision of terminal ileum with ileocolic anastomosis 8/21-     seen with Dr. Arroyo, pt sitting on bedside chair   abdominal pain.  TPN on going   midline scar, BS quiet - he asked not to press on abdomen.  moving all limbs.     - dx lap cailin 8/11- ( pod # 11) , Excision of part of colon and excision of terminal ileum with ileocolic anastomosis 8/21- pod # 1  - await return of bowel.- not passing any gas yet.   - TOV   - TPN.   -  holding off on steroids(I given methylprednisone 20 mg iv q 8 hourly- )   - CRP normal now.- daily CRP 7.7 on 8/16--> 9.6 on 8/17 --> 7. on 8/18-  wnl on 8/19-  - fu lytes and to ensure K, Mg and Phos are all wnl.   - can give Tylenol IV for pain  - avoid opoids, avoid NSAID.     - fu repeat Quantiferon test, fu PPD -negative.   - prealbumin 17 on 8/16    Vitamin d insufficiency - 23.7 on 8/16  when allow to take oral would need Vitamin D supplement.   B12- 755 on 8/16- adequate     - dvt prophylasix - on heparin sq    Thank you for allowing medicine to participate in the care, will follow.

## 2024-08-23 LAB
ANION GAP SERPL CALC-SCNC: 8 MMOL/L — SIGNIFICANT CHANGE UP (ref 5–17)
BUN SERPL-MCNC: 10 MG/DL — SIGNIFICANT CHANGE UP (ref 7–23)
CALCIUM SERPL-MCNC: 8.8 MG/DL — SIGNIFICANT CHANGE UP (ref 8.4–10.5)
CHLORIDE SERPL-SCNC: 101 MMOL/L — SIGNIFICANT CHANGE UP (ref 96–108)
CO2 SERPL-SCNC: 24 MMOL/L — SIGNIFICANT CHANGE UP (ref 22–31)
CREAT SERPL-MCNC: 0.6 MG/DL — SIGNIFICANT CHANGE UP (ref 0.5–1.3)
EGFR: 120 ML/MIN/1.73M2 — SIGNIFICANT CHANGE UP
GLUCOSE SERPL-MCNC: 143 MG/DL — HIGH (ref 70–99)
HCT VFR BLD CALC: 42.3 % — SIGNIFICANT CHANGE UP (ref 39–50)
HGB BLD-MCNC: 13.7 G/DL — SIGNIFICANT CHANGE UP (ref 13–17)
MAGNESIUM SERPL-MCNC: 2 MG/DL — SIGNIFICANT CHANGE UP (ref 1.6–2.6)
MCHC RBC-ENTMCNC: 27.2 PG — SIGNIFICANT CHANGE UP (ref 27–34)
MCHC RBC-ENTMCNC: 32.4 GM/DL — SIGNIFICANT CHANGE UP (ref 32–36)
MCV RBC AUTO: 84.1 FL — SIGNIFICANT CHANGE UP (ref 80–100)
NRBC # BLD: 0 /100 WBCS — SIGNIFICANT CHANGE UP (ref 0–0)
PHOSPHATE SERPL-MCNC: 2.6 MG/DL — SIGNIFICANT CHANGE UP (ref 2.5–4.5)
PLATELET # BLD AUTO: 272 K/UL — SIGNIFICANT CHANGE UP (ref 150–400)
POTASSIUM SERPL-MCNC: 4.5 MMOL/L — SIGNIFICANT CHANGE UP (ref 3.5–5.3)
POTASSIUM SERPL-SCNC: 4.5 MMOL/L — SIGNIFICANT CHANGE UP (ref 3.5–5.3)
RBC # BLD: 5.03 M/UL — SIGNIFICANT CHANGE UP (ref 4.2–5.8)
RBC # FLD: 13.9 % — SIGNIFICANT CHANGE UP (ref 10.3–14.5)
SODIUM SERPL-SCNC: 133 MMOL/L — LOW (ref 135–145)
WBC # BLD: 19.45 K/UL — HIGH (ref 3.8–10.5)
WBC # FLD AUTO: 19.45 K/UL — HIGH (ref 3.8–10.5)

## 2024-08-23 PROCEDURE — 99233 SBSQ HOSP IP/OBS HIGH 50: CPT

## 2024-08-23 RX ORDER — ACETAMINOPHEN 325 MG/1
1000 TABLET ORAL ONCE
Refills: 0 | Status: DISCONTINUED | OUTPATIENT
Start: 2024-08-24 | End: 2024-08-24

## 2024-08-23 RX ORDER — ELECTROLYTE SOLUTION,INJ
1 VIAL (ML) INTRAVENOUS
Refills: 0 | Status: DISCONTINUED | OUTPATIENT
Start: 2024-08-23 | End: 2024-08-23

## 2024-08-23 RX ORDER — ACETAMINOPHEN 325 MG/1
1000 TABLET ORAL ONCE
Refills: 0 | Status: COMPLETED | OUTPATIENT
Start: 2024-08-23 | End: 2024-08-23

## 2024-08-23 RX ORDER — I.V. FAT EMULSION 20 G/100ML
0.64 EMULSION INTRAVENOUS
Qty: 50 | Refills: 0 | Status: DISCONTINUED | OUTPATIENT
Start: 2024-08-23 | End: 2024-08-23

## 2024-08-23 RX ADMIN — Medication 1 EACH: at 17:33

## 2024-08-23 RX ADMIN — HYDROMORPHONE HYDROCHLORIDE 0.25 MILLIGRAM(S): 2 TABLET ORAL at 15:52

## 2024-08-23 RX ADMIN — HYDROMORPHONE HYDROCHLORIDE 0.5 MILLIGRAM(S): 2 TABLET ORAL at 04:22

## 2024-08-23 RX ADMIN — HYDROMORPHONE HYDROCHLORIDE 0.25 MILLIGRAM(S): 2 TABLET ORAL at 16:07

## 2024-08-23 RX ADMIN — ACETAMINOPHEN 400 MILLIGRAM(S): 325 TABLET ORAL at 07:56

## 2024-08-23 RX ADMIN — ACETAMINOPHEN 1000 MILLIGRAM(S): 325 TABLET ORAL at 08:10

## 2024-08-23 RX ADMIN — Medication 5000 UNIT(S): at 15:48

## 2024-08-23 RX ADMIN — ACETAMINOPHEN 400 MILLIGRAM(S): 325 TABLET ORAL at 16:19

## 2024-08-23 RX ADMIN — Medication 5000 UNIT(S): at 06:19

## 2024-08-23 RX ADMIN — I.V. FAT EMULSION 20.83 GM/KG/DAY: 20 EMULSION INTRAVENOUS at 17:33

## 2024-08-23 RX ADMIN — ACETAMINOPHEN 1000 MILLIGRAM(S): 325 TABLET ORAL at 16:34

## 2024-08-23 RX ADMIN — Medication 5000 UNIT(S): at 22:12

## 2024-08-23 RX ADMIN — HYDROMORPHONE HYDROCHLORIDE 0.5 MILLIGRAM(S): 2 TABLET ORAL at 04:50

## 2024-08-23 RX ADMIN — CHLORHEXIDINE GLUCONATE 1 APPLICATION(S): 40 SOLUTION TOPICAL at 05:53

## 2024-08-23 NOTE — PROGRESS NOTE ADULT - ATTENDING COMMENTS
CRS Attending  Feels improved since made NPO, nausea resolved. Denies flatus or BM. Ambulated yesterday and this morning.  Afebrile  Abdomen is less distended, appropriate incisional tenderess to palpation .   NPO with sips/ice chips, await GI function  Continue TPN  OOB, ambulate  multimodality pain control.  Discussed with surgical housestaff  CRS weekend attending coverage 8/23-8/25 Dr May

## 2024-08-23 NOTE — PROGRESS NOTE ADULT - SUBJECTIVE AND OBJECTIVE BOX
Medicine Progress Note (INCOMPLETE)    SUBJECTIVE / OVERNIGHT EVENTS:  O/N events:  Patient was seen and examined at bedside.  Otherwise negative ROS    MEDICATIONS  (STANDING):  chlorhexidine 2% Cloths 1 Application(s) Topical <User Schedule>  heparin   Injectable 5000 Unit(s) SubCutaneous every 8 hours  lactated ringers. 1000 milliLiter(s) (40 mL/Hr) IV Continuous <Continuous>  lipid, fat emulsion (Fish Oil and Plant Based) 20% Infusion 0.641 Gm/kG/Day (20.83 mL/Hr) IV Continuous <Continuous>  Parenteral Nutrition - Adult 1 Each (63 mL/Hr) TPN Continuous <Continuous>    MEDICATIONS  (PRN):  HYDROmorphone  Injectable 0.25 milliGRAM(s) IV Push every 6 hours PRN Moderate Pain (4 - 6)  HYDROmorphone  Injectable 0.5 milliGRAM(s) IV Push every 6 hours PRN Severe Pain (7 - 10)  ondansetron Injectable 4 milliGRAM(s) IV Push every 6 hours PRN Nausea    CAPILLARY BLOOD GLUCOSE            OBJECTIVE:  Vital Signs Last 24 Hrs  T(C): 37.1 (23 Aug 2024 08:25), Max: 37.3 (23 Aug 2024 05:24)  T(F): 98.7 (23 Aug 2024 08:25), Max: 99.1 (23 Aug 2024 05:24)  HR: 83 (23 Aug 2024 08:25) (83 - 100)  BP: 129/77 (23 Aug 2024 08:25) (129/77 - 137/88)  BP(mean): --  RR: 17 (23 Aug 2024 08:25) (17 - 18)  SpO2: 96% (23 Aug 2024 08:25) (95% - 97%)    Parameters below as of 23 Aug 2024 08:25  Patient On (Oxygen Delivery Method): room air        PHYSICAL EXAM:  General: AAOx3, NAD, euvolemic  Head: NC/AT; MMM; PERRL  Neck: Supple; no JVD  Respiratory: CTAB; no wheezes/rales/rhonchi  Cardiovascular: Regular rhythm/rate; S1/S2+, no m/r/g auscultated  Gastrointestinal: Soft; NTND; bowel sounds normal and present in all 4 quadrants  :   Extremities: WWP; no b/l U/E edema, no b/l L/E edema  Neurological: CNII-XII grossly intact; no obvious focal deficits  I&O's Summary    22 Aug 2024 07:01  -  23 Aug 2024 07:00  --------------------------------------------------------  IN: 1470.4 mL / OUT: 2680 mL / NET: -1209.6 mL    23 Aug 2024 07:01  -  23 Aug 2024 10:12  --------------------------------------------------------  IN: 0 mL / OUT: 600 mL / NET: -600 mL        LABS:                        13.7   19.45 )-----------( 272      ( 23 Aug 2024 06:08 )             42.3     08-23    133<L>  |  101  |  10  ----------------------------<  143<H>  4.5   |  24  |  0.60    Ca    8.8      23 Aug 2024 06:08  Phos  2.6     08-23  Mg     2.0     08-23            Urinalysis Basic - ( 23 Aug 2024 06:08 )    Color: x / Appearance: x / SG: x / pH: x  Gluc: 143 mg/dL / Ketone: x  / Bili: x / Urobili: x   Blood: x / Protein: x / Nitrite: x   Leuk Esterase: x / RBC: x / WBC x   Sq Epi: x / Non Sq Epi: x / Bacteria: x            RADIOLOGY & ADDITIONAL TESTS:  Imaging from Last 24 Hours: Medicine Progress Note    SUBJECTIVE / OVERNIGHT EVENTS:  Patient was seen and examined at bedside reporting improvement in neck pain but feels gas pains in his R shoulder. Reports he is burping and is better able to tolerate lying down in bed. Denies passing gas or BMs  Otherwise negative ROS    MEDICATIONS  (STANDING):  chlorhexidine 2% Cloths 1 Application(s) Topical <User Schedule>  heparin   Injectable 5000 Unit(s) SubCutaneous every 8 hours  lactated ringers. 1000 milliLiter(s) (40 mL/Hr) IV Continuous <Continuous>  lipid, fat emulsion (Fish Oil and Plant Based) 20% Infusion 0.641 Gm/kG/Day (20.83 mL/Hr) IV Continuous <Continuous>  Parenteral Nutrition - Adult 1 Each (63 mL/Hr) TPN Continuous <Continuous>    MEDICATIONS  (PRN):  HYDROmorphone  Injectable 0.25 milliGRAM(s) IV Push every 6 hours PRN Moderate Pain (4 - 6)  HYDROmorphone  Injectable 0.5 milliGRAM(s) IV Push every 6 hours PRN Severe Pain (7 - 10)  ondansetron Injectable 4 milliGRAM(s) IV Push every 6 hours PRN Nausea    CAPILLARY BLOOD GLUCOSE            OBJECTIVE:  Vital Signs Last 24 Hrs  T(C): 37.1 (23 Aug 2024 08:25), Max: 37.3 (23 Aug 2024 05:24)  T(F): 98.7 (23 Aug 2024 08:25), Max: 99.1 (23 Aug 2024 05:24)  HR: 83 (23 Aug 2024 08:25) (83 - 100)  BP: 129/77 (23 Aug 2024 08:25) (129/77 - 137/88)  BP(mean): --  RR: 17 (23 Aug 2024 08:25) (17 - 18)  SpO2: 96% (23 Aug 2024 08:25) (95% - 97%)    Parameters below as of 23 Aug 2024 08:25  Patient On (Oxygen Delivery Method): room air        PHYSICAL EXAM:  General: AAOx3, NAD, euvolemic  Head: NC/AT; MMM; PERRL  Neck: Supple; no JVD  Respiratory: CTAB; no wheezes/rales/rhonchi  Cardiovascular: Regular rhythm/rate; S1/S2+, no m/r/g auscultated  Gastrointestinal: Soft; diffusely TTP, hypoactive bowel sounds noted in LUQ and RLQ  Extremities: WWP; no b/l U/E edema, no b/l L/E edema  Neurological: CNII-XII grossly intact; no obvious focal deficits  I&O's Summary    22 Aug 2024 07:01  -  23 Aug 2024 07:00  --------------------------------------------------------  IN: 1470.4 mL / OUT: 2680 mL / NET: -1209.6 mL    23 Aug 2024 07:01  -  23 Aug 2024 10:12  --------------------------------------------------------  IN: 0 mL / OUT: 600 mL / NET: -600 mL        LABS:                        13.7   19.45 )-----------( 272      ( 23 Aug 2024 06:08 )             42.3     08-23    133<L>  |  101  |  10  ----------------------------<  143<H>  4.5   |  24  |  0.60    Ca    8.8      23 Aug 2024 06:08  Phos  2.6     08-23  Mg     2.0     08-23            Urinalysis Basic - ( 23 Aug 2024 06:08 )    Color: x / Appearance: x / SG: x / pH: x  Gluc: 143 mg/dL / Ketone: x  / Bili: x / Urobili: x   Blood: x / Protein: x / Nitrite: x   Leuk Esterase: x / RBC: x / WBC x   Sq Epi: x / Non Sq Epi: x / Bacteria: x            RADIOLOGY & ADDITIONAL TESTS:  Imaging from Last 24 Hours:

## 2024-08-23 NOTE — PROGRESS NOTE ADULT - ASSESSMENT
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 Patient afebrile, HD normal on presentation without acute laboratory abnormalaties noted. CTAP concerning for closed loop obstruction with two distinct transition points. Now s/p dx lap, lysis of adhesions (8/11). Colonoscpy 8/20 significant for ileo-colonic anastamosis stricture.       NPO with controlled sips to be given by Team 5 /IVF  PICC/TPN  Pain/nausea control PRN  GI Consult - 20 mg Methylprednisolone TID (currently holding)  HSQ/SCDs/IS/OOBA  AM labs

## 2024-08-23 NOTE — PROGRESS NOTE ADULT - SUBJECTIVE AND OBJECTIVE BOX
POST-OP DAY: #11 s/p diagnostic lap, MEL; #1 s/p ex lap, ileocolic resection      SUBJECTIVE: Patient seen and examined bedside by chief resident on AM rounds. Patient states he feels better than yesterday and would like to trial drinking some liquids. HE has been OOBA. Denies any nausea/vomiting. -BF     heparin   Injectable 5000 Unit(s) SubCutaneous every 8 hours    MEDICATIONS  (PRN):  HYDROmorphone  Injectable 0.25 milliGRAM(s) IV Push every 6 hours PRN Moderate Pain (4 - 6)  HYDROmorphone  Injectable 0.5 milliGRAM(s) IV Push every 6 hours PRN Severe Pain (7 - 10)  ondansetron Injectable 4 milliGRAM(s) IV Push every 6 hours PRN Nausea      I&O's Detail    22 Aug 2024 07:01  -  23 Aug 2024 07:00  --------------------------------------------------------  IN:    Fat Emulsion (Fish Oil &amp; Plant Based) 20% Infusion: 166.4 mL    Lactated Ringers: 320 mL    Oral Fluid: 480 mL    TPN (Total Parenteral Nutrition): 504 mL  Total IN: 1470.4 mL    OUT:    Voided (mL): 2680 mL  Total OUT: 2680 mL    Total NET: -1209.6 mL      23 Aug 2024 07:01  -  23 Aug 2024 08:51  --------------------------------------------------------  IN:  Total IN: 0 mL    OUT:    Voided (mL): 600 mL  Total OUT: 600 mL    Total NET: -600 mL          Vital Signs Last 24 Hrs  T(C): 37.1 (23 Aug 2024 08:25), Max: 37.3 (23 Aug 2024 05:24)  T(F): 98.7 (23 Aug 2024 08:25), Max: 99.1 (23 Aug 2024 05:24)  HR: 83 (23 Aug 2024 08:25) (83 - 100)  BP: 129/77 (23 Aug 2024 08:25) (129/77 - 138/80)  BP(mean): --  RR: 17 (23 Aug 2024 08:25) (17 - 18)  SpO2: 96% (23 Aug 2024 08:25) (95% - 98%)    Parameters below as of 23 Aug 2024 08:25  Patient On (Oxygen Delivery Method): room air        General: NAD, resting comfortably in bed  Pulm: Nonlabored breathing, no respiratory distress  Abd: soft, mildly distended, non TTP. midline incision c/d/i   Extrem: WWP, no edema, SCDs in place    LABS:                        13.7   19.45 )-----------( 272      ( 23 Aug 2024 06:08 )             42.3     08-23    133<L>  |  101  |  10  ----------------------------<  143<H>  4.5   |  24  |  0.60    Ca    8.8      23 Aug 2024 06:08  Phos  2.6     08-23  Mg     2.0     08-23        Urinalysis Basic - ( 23 Aug 2024 06:08 )    Color: x / Appearance: x / SG: x / pH: x  Gluc: 143 mg/dL / Ketone: x  / Bili: x / Urobili: x   Blood: x / Protein: x / Nitrite: x   Leuk Esterase: x / RBC: x / WBC x   Sq Epi: x / Non Sq Epi: x / Bacteria: x        RADIOLOGY & ADDITIONAL STUDIES:

## 2024-08-23 NOTE — PROGRESS NOTE ADULT - ASSESSMENT
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 Patient afebrile, HD normal on presentation without acute laboratory abnormalaties noted. CTAP concerning for closed loop obstruction with two distinct transition points. Now s/p dx lap, lysis of adhesions (8/11). Medicine consulted for indeterminant quantiferon result.    #Crohn's disease w/ ileocolonic anastamosis stricture sp dx lap with lysis of adhesions (8/11), ileocolonic anastamosis revision (small bowel to transverse colon) 8/21  #Indeterminant Quant Result  Patient underwent TB testing per GI in preparation for possible immunosuppressive therapy, currently on methylpred IV 20mg TID  Hep B Ag, Ab, and Core Ab negative  Indeterminant result likely as a result from incorrect collection of specimen, patient has no risk factors for TB, no known exposures.  - cscope showed ileo-colonic anastamosis stricture planned for laparoscopic, possible open, ileocolic resection with GI 8/22 with stapled anastamosis, maintain on TPN. F/u nutrition recs  - noted to be on dilaudid pain regimen, would recommend ofirmev x1   - pt has not passed gas yet- would recommend OOBTC as tolerated  - Repeat quant TB specimen received and pending, however skin PPD test was negative - no further action required.  - Rest of care per surgery and GI    Case discussed with Dr. Walters. 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 Patient afebrile, HD normal on presentation without acute laboratory abnormalaties noted. CTAP concerning for closed loop obstruction with two distinct transition points. Now s/p dx lap, lysis of adhesions (8/11). Medicine consulted for indeterminant quantiferon result.    #Crohn's disease w/ ileocolonic anastamosis stricture sp dx lap with lysis of adhesions (8/11), ileocolonic anastamosis revision (small bowel to transverse colon) 8/21  #Indeterminant Quant Result  Patient underwent TB testing per GI in preparation for possible immunosuppressive therapy, currently on methylpred IV 20mg TID  Hep B Ag, Ab, and Core Ab negative  Indeterminant result likely as a result from incorrect collection of specimen, patient has no risk factors for TB, no known exposures.  - cscope showed ileo-colonic anastamosis stricture planned for laparoscopic, possible open, ileocolic resection with GI 8/22 with stapled anastamosis, maintain on TPN. F/u nutrition recs  - noted to be on dilaudid pain regimen, cw ofirmev PRN  - pt has not passed gas yet- would recommend OOBTC as tolerated encouraged patient to walk  - Repeat quant TB specimen received and pending, however skin PPD test was negative - no further action required.  - Rest of care per surgery and GI    Case discussed with Dr. Waltres. Medicine will continue to follow

## 2024-08-23 NOTE — PROGRESS NOTE ADULT - ATTENDING COMMENTS
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 Patient afebrile, HD normal on presentation without acute laboratory abnormalaties noted. CTAP concerning for closed loop obstruction with two distinct transition points. Now s/p dx lap, lysis of adhesions (8/11). Medicine consulted for indeterminant quantiferon result,   intervally - started on TPN 8/16, CT enterography 8/16-reviewed- with possible partial sbo, colonoscopy 8/20- stricture ,   sp lap-, Excision of part of colon and excision of terminal ileum with ileocolic anastomosis 8/21-     seen with Dr. Arroyo,   not passing gas yet, abdominal pain is less * IV tylenol helped ( given x 4 )  TPN on going.  RRR, good air entry bilaterally  BS very sluggish -distended   no edema noted on bilateral lower ext  neuro -non focal.       - dx lap cailin 8/11- ( pod # 12) , Excision of part of colon and excision of terminal ileum with ileocolic anastomosis 8/21- pod # 2  - await return of bowel.- not passing any gas yet.   - TPN.   -  holding off on steroids(I given methylprednisone 20 mg iv q 8 hourly- )   - CRP normal now.- daily CRP 7.7 on 8/16--> 9.6 on 8/17 --> 7. on 8/18-  wnl on 8/19-  - fu lytes and to ensure K, Mg and Phos are all wnl.   - can give Tylenol IV for pain  - dilaudid low dose prn.   - avoid opoids, avoid NSAID.   - wbc 19 ( acute reaction-- clinically appear well  - Mild hyponatremia     - fu repeat Quantiferon test, fu PPD -negative.   - prealbumin 17 on 8/16    Vitamin d insufficiency - 23.7 on 8/16  when allow to take oral would need Vitamin D supplement.   B12- 755 on 8/16- adequate     - dvt prophylasix - on heparin sq    Thank you for allowing medicine to participate in the care, will follow.

## 2024-08-24 LAB
ADD ON TEST-SPECIMEN IN LAB: SIGNIFICANT CHANGE UP
ANION GAP SERPL CALC-SCNC: 10 MMOL/L — SIGNIFICANT CHANGE UP (ref 5–17)
BILIRUB DIRECT SERPL-MCNC: <0.2 MG/DL — SIGNIFICANT CHANGE UP (ref 0–0.3)
BUN SERPL-MCNC: 10 MG/DL — SIGNIFICANT CHANGE UP (ref 7–23)
CALCIUM SERPL-MCNC: 9.3 MG/DL — SIGNIFICANT CHANGE UP (ref 8.4–10.5)
CHLORIDE SERPL-SCNC: 97 MMOL/L — SIGNIFICANT CHANGE UP (ref 96–108)
CO2 SERPL-SCNC: 25 MMOL/L — SIGNIFICANT CHANGE UP (ref 22–31)
CREAT SERPL-MCNC: 0.61 MG/DL — SIGNIFICANT CHANGE UP (ref 0.5–1.3)
EGFR: 119 ML/MIN/1.73M2 — SIGNIFICANT CHANGE UP
GLUCOSE SERPL-MCNC: 125 MG/DL — HIGH (ref 70–99)
HCT VFR BLD CALC: 40.9 % — SIGNIFICANT CHANGE UP (ref 39–50)
HGB BLD-MCNC: 13.2 G/DL — SIGNIFICANT CHANGE UP (ref 13–17)
MAGNESIUM SERPL-MCNC: 2 MG/DL — SIGNIFICANT CHANGE UP (ref 1.6–2.6)
MCHC RBC-ENTMCNC: 27.2 PG — SIGNIFICANT CHANGE UP (ref 27–34)
MCHC RBC-ENTMCNC: 32.3 GM/DL — SIGNIFICANT CHANGE UP (ref 32–36)
MCV RBC AUTO: 84.3 FL — SIGNIFICANT CHANGE UP (ref 80–100)
NRBC # BLD: 0 /100 WBCS — SIGNIFICANT CHANGE UP (ref 0–0)
PHOSPHATE SERPL-MCNC: 4.2 MG/DL — SIGNIFICANT CHANGE UP (ref 2.5–4.5)
PLATELET # BLD AUTO: 268 K/UL — SIGNIFICANT CHANGE UP (ref 150–400)
POTASSIUM SERPL-MCNC: 4.2 MMOL/L — SIGNIFICANT CHANGE UP (ref 3.5–5.3)
POTASSIUM SERPL-SCNC: 4.2 MMOL/L — SIGNIFICANT CHANGE UP (ref 3.5–5.3)
RBC # BLD: 4.85 M/UL — SIGNIFICANT CHANGE UP (ref 4.2–5.8)
RBC # FLD: 13.9 % — SIGNIFICANT CHANGE UP (ref 10.3–14.5)
SODIUM SERPL-SCNC: 132 MMOL/L — LOW (ref 135–145)
WBC # BLD: 12.24 K/UL — HIGH (ref 3.8–10.5)
WBC # FLD AUTO: 12.24 K/UL — HIGH (ref 3.8–10.5)

## 2024-08-24 PROCEDURE — 99232 SBSQ HOSP IP/OBS MODERATE 35: CPT

## 2024-08-24 RX ORDER — I.V. FAT EMULSION 20 G/100ML
0.64 EMULSION INTRAVENOUS
Qty: 50 | Refills: 0 | Status: DISCONTINUED | OUTPATIENT
Start: 2024-08-24 | End: 2024-08-24

## 2024-08-24 RX ORDER — ELECTROLYTE SOLUTION,INJ
1 VIAL (ML) INTRAVENOUS
Refills: 0 | Status: DISCONTINUED | OUTPATIENT
Start: 2024-08-24 | End: 2024-08-24

## 2024-08-24 RX ORDER — ACETAMINOPHEN 325 MG/1
1000 TABLET ORAL ONCE
Refills: 0 | Status: COMPLETED | OUTPATIENT
Start: 2024-08-24 | End: 2024-08-24

## 2024-08-24 RX ORDER — ACETAMINOPHEN 325 MG/1
1000 TABLET ORAL EVERY 6 HOURS
Refills: 0 | Status: COMPLETED | OUTPATIENT
Start: 2024-08-24 | End: 2024-08-25

## 2024-08-24 RX ADMIN — I.V. FAT EMULSION 20.8 GM/KG/DAY: 20 EMULSION INTRAVENOUS at 18:18

## 2024-08-24 RX ADMIN — ACETAMINOPHEN 1000 MILLIGRAM(S): 325 TABLET ORAL at 13:00

## 2024-08-24 RX ADMIN — ONDANSETRON 4 MILLIGRAM(S): 2 INJECTION, SOLUTION INTRAMUSCULAR; INTRAVENOUS at 12:34

## 2024-08-24 RX ADMIN — ACETAMINOPHEN 400 MILLIGRAM(S): 325 TABLET ORAL at 12:34

## 2024-08-24 RX ADMIN — ACETAMINOPHEN 400 MILLIGRAM(S): 325 TABLET ORAL at 20:30

## 2024-08-24 RX ADMIN — ACETAMINOPHEN 400 MILLIGRAM(S): 325 TABLET ORAL at 00:24

## 2024-08-24 RX ADMIN — Medication 1 EACH: at 18:18

## 2024-08-24 RX ADMIN — ACETAMINOPHEN 400 MILLIGRAM(S): 325 TABLET ORAL at 06:35

## 2024-08-24 RX ADMIN — CHLORHEXIDINE GLUCONATE 1 APPLICATION(S): 40 SOLUTION TOPICAL at 06:35

## 2024-08-24 RX ADMIN — Medication 5000 UNIT(S): at 06:35

## 2024-08-24 RX ADMIN — Medication 5000 UNIT(S): at 23:06

## 2024-08-24 RX ADMIN — Medication 5000 UNIT(S): at 14:54

## 2024-08-24 NOTE — PROGRESS NOTE ADULT - ASSESSMENT
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 Patient afebrile, HD normal on presentation without acute laboratory abnormalaties noted. CTAP concerning for closed loop obstruction with two distinct transition points. Now s/p dx lap, lysis of adhesions (8/11). Medicine consulted for indeterminant quantiferon result.    #Crohn's disease w/ ileocolonic anastamosis stricture sp dx lap with lysis of adhesions (8/11), ileocolonic anastamosis revision (small bowel to transverse colon) 8/21  #Indeterminant Quant Result  Patient underwent TB testing per GI in preparation for possible immunosuppressive therapy, currently on methylpred IV 20mg TID  Hep B Ag, Ab, and Core Ab negative  Indeterminant result likely as a result from incorrect collection of specimen, patient has no risk factors for TB, no known exposures.  - cscope showed ileo-colonic anastamosis stricture planned for laparoscopic, possible open, ileocolic resection with GI 8/22 with stapled anastamosis, maintain on TPN. F/u nutrition recs  - noted to be on dilaudid pain regimen, cw ofirmev PRN  - pt has not passed gas yet- would recommend OOBTC as tolerated encouraged patient to walk  - Repeat quant TB specimen received and pending, however skin PPD test was negative - no further action required.  - Rest of care per surgery and GI    Case discussed with Dr. Walters. 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 for abdominal pain. Patient afebrile, HD normal on presentation without acute laboratory abnormalaties noted. CTAP concerning for closed loop obstruction with two distinct transition points. Now s/p dx lap, lysis of adhesions (8/11). Medicine consulted for indeterminant quantiferon result.    #Crohn's disease w/ ileocolonic anastamosis stricture sp dx lap with lysis of adhesions (8/11), ileocolonic anastamosis revision (small bowel to transverse colon) 8/21  #Indeterminant Quant Result  Patient underwent TB testing per GI in preparation for possible immunosuppressive therapy, currently on methylpred IV 20mg TID  Hep B Ag, Ab, and Core Ab negative  Indeterminant result likely as a result from incorrect collection of specimen, patient has no risk factors for TB, no known exposures.  - cscope showed ileo-colonic anastamosis stricture planned for laparoscopic, possible open, ileocolic resection with GI 8/22 with stapled anastamosis, maintain on TPN. F/u nutrition recs. Continue to encourage OOBTC  - noted to be on dilaudid pain regimen, cw ofirmev PRN  - pt has not passed gas yet- would recommend OOBTC as tolerated encouraged patient to walk  - Repeat quant TB specimen received and pending, however skin PPD test was negative - no further action required.  - Rest of care per surgery and GI    Case discussed with Dr. Walters. Medicine will continue to follow

## 2024-08-24 NOTE — PROGRESS NOTE ADULT - ATTENDING COMMENTS
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 for abdominal pain. Patient afebrile, HD normal on presentation without acute laboratory abnormalaties noted. CTAP concerning for closed loop obstruction with two distinct transition points. Now s/p dx lap, lysis of adhesions (8/11). Medicine consulted for indeterminant quantiferon result,   intervally - started on TPN 8/16, CT enterography 8/16-reviewed- with possible partial sbo, colonoscopy 8/20- stricture ,   sp lap-, Excision of part of colon and excision of terminal ileum with ileocolic anastomosis 8/21-     seen with Dr. Arroyo,   not passing gas yet, abdominal pain is less   seen sitting on bedside chair.   TPN on going- right arm line area clean.   RRR, good air entry bilaterally  BS very sluggish -soft some distension.  no edema noted on bilateral lower ext  neuro -non focal.       - dx lap cailin 8/11- ( pod # 13) , Excision of part of colon and excision of terminal ileum with ileocolic anastomosis 8/21- pod # 3  - await return of bowel.- not passing any gas yet.   - diet per surgery - sips/chips.  - TPN.   -  holding off on steroids(I given methylprednisone 20 mg iv q 8 hourly- )   - CRP normal now.- daily CRP 7.7 on 8/16--> 9.6 on 8/17 --> 7. on 8/18-  wnl on 8/19-  - fu lytes and to ensure K, Mg and Phos are all wnl.   - can give Tylenol IV for pain  - avoid opoids, avoid NSAID.   - wbc 19 ( acute reaction-- clinically appear well  - Mild hyponatremia     - fu repeat Quantiferon test, fu PPD -negative.   - prealbumin 17 on 8/16    Vitamin d insufficiency - 23.7 on 8/16  when allow to take oral would need Vitamin D supplement.   B12- 755 on 8/16- adequate     - dvt prophylasix - on heparin sq    Thank you for allowing medicine to participate in the care, will follow. dw primary team.

## 2024-08-24 NOTE — PROGRESS NOTE ADULT - ATTENDING COMMENTS
Covering for Dr. Cloud.    No complaints.  Wants to eat more  No flatus/BM  AFVSS  NAD  Abd soft, distended, incision clean    A/P: POD 3 ileocolic anastomosis resection for Crohn's stricture.  Ileus.  1. Sips/chips  2. TPN  3. Ambulation encouraged  4. Limit opioids.

## 2024-08-24 NOTE — PROGRESS NOTE ADULT - ASSESSMENT
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 Patient afebrile, HD normal on presentation without acute laboratory abnormalaties noted. CTAP concerning for closed loop obstruction with two distinct transition points. Now s/p dx lap, lysis of adhesions (8/11). Colonoscpy 8/20 significant for ileo-colonic anastamosis stricture. Now s/p ex lap and ileocolic anastamosis (8/21).    NPO w/ limited sips/IVF  PICC/TPN  Pain/nausea control PRN  HSQ/SCDs/IS/OOBA  AM labs

## 2024-08-24 NOTE — PROGRESS NOTE ADULT - SUBJECTIVE AND OBJECTIVE BOX
INTERVAL HPI/OVERNIGHT EVENTS:    STATUS POST:      POST OPERATIVE DAY #:     SUBJECTIVE: Pt seen and examined at bedside this am by surgery team. Tolerating diet, pain well controlled. Denies f/n/v/cp/sob.    MEDICATIONS  (STANDING):  acetaminophen   IVPB .. 1000 milliGRAM(s) IV Intermittent once  acetaminophen   IVPB .. 1000 milliGRAM(s) IV Intermittent once  chlorhexidine 2% Cloths 1 Application(s) Topical <User Schedule>  heparin   Injectable 5000 Unit(s) SubCutaneous every 8 hours  lactated ringers. 1000 milliLiter(s) (40 mL/Hr) IV Continuous <Continuous>  lipid, fat emulsion (Fish Oil and Plant Based) 20% Infusion 0.641 Gm/kG/Day (20.83 mL/Hr) IV Continuous <Continuous>  Parenteral Nutrition - Adult 1 Each (63 mL/Hr) TPN Continuous <Continuous>    MEDICATIONS  (PRN):  HYDROmorphone  Injectable 0.25 milliGRAM(s) IV Push every 6 hours PRN Moderate Pain (4 - 6)  HYDROmorphone  Injectable 0.5 milliGRAM(s) IV Push every 6 hours PRN Severe Pain (7 - 10)  ondansetron Injectable 4 milliGRAM(s) IV Push every 6 hours PRN Nausea      Vital Signs Last 24 Hrs  T(C): 36.3 (24 Aug 2024 05:56), Max: 37.1 (23 Aug 2024 08:25)  T(F): 97.4 (24 Aug 2024 05:56), Max: 98.7 (23 Aug 2024 08:25)  HR: 80 (24 Aug 2024 05:56) (80 - 90)  BP: 118/76 (24 Aug 2024 05:56) (118/76 - 137/85)  BP(mean): --  RR: 16 (24 Aug 2024 05:56) (16 - 17)  SpO2: 96% (24 Aug 2024 05:56) (95% - 97%)    Parameters below as of 24 Aug 2024 05:56  Patient On (Oxygen Delivery Method): room air        Physical Exam:  Constitutional: A&Ox3, NAD  Respiratory: normal work of breathing  Cardiovascular: NSR, RRR  Abdomen: soft, non-tender, non distended, no rebound or gaurding  Incision:  Ostomy:  Genitourinary:  Legs: WWP, SCDs in place, no calf tenderness        I&O's Detail    22 Aug 2024 07:01  -  23 Aug 2024 07:00  --------------------------------------------------------  IN:    Fat Emulsion (Fish Oil &amp; Plant Based) 20% Infusion: 166.4 mL    Lactated Ringers: 320 mL    Oral Fluid: 480 mL    TPN (Total Parenteral Nutrition): 504 mL  Total IN: 1470.4 mL    OUT:    Voided (mL): 2680 mL  Total OUT: 2680 mL    Total NET: -1209.6 mL      23 Aug 2024 07:01  -  24 Aug 2024 06:20  --------------------------------------------------------  IN:    Fat Emulsion (Fish Oil &amp; Plant Based) 20% Infusion: 104 mL    Lactated Ringers: 600 mL    TPN (Total Parenteral Nutrition): 945 mL  Total IN: 1649 mL    OUT:    Oral Fluid: 0 mL    Voided (mL): 1650 mL  Total OUT: 1650 mL    Total NET: -1 mL          LABS:                        13.7   19.45 )-----------( 272      ( 23 Aug 2024 06:08 )             42.3     08-23    133<L>  |  101  |  10  ----------------------------<  143<H>  4.5   |  24  |  0.60    Ca    8.8      23 Aug 2024 06:08  Phos  2.6     08-23  Mg     2.0     08-23        Urinalysis Basic - ( 23 Aug 2024 06:08 )    Color: x / Appearance: x / SG: x / pH: x  Gluc: 143 mg/dL / Ketone: x  / Bili: x / Urobili: x   Blood: x / Protein: x / Nitrite: x   Leuk Esterase: x / RBC: x / WBC x   Sq Epi: x / Non Sq Epi: x / Bacteria: x        RADIOLOGY & ADDITIONAL STUDIES: INTERVAL HPI/OVERNIGHT EVENTS: No acute events    STATUS POST:  dx lap with lysis of adhesions and ROTTOR for exlap with resection of strictured ileocolic anastomosis and new ileocolic anastamosis  POST OPERATIVE DAY #: 13 and 3    SUBJECTIVE: Pt seen and examined at bedside this am by surgery team. Tolerating some clears, pain well controlled. Denies f/n/v/cp/sob. He is not passing flatus or having bowel movements.     MEDICATIONS  (STANDING):  acetaminophen   IVPB .. 1000 milliGRAM(s) IV Intermittent once  acetaminophen   IVPB .. 1000 milliGRAM(s) IV Intermittent once  chlorhexidine 2% Cloths 1 Application(s) Topical <User Schedule>  heparin   Injectable 5000 Unit(s) SubCutaneous every 8 hours  lactated ringers. 1000 milliLiter(s) (40 mL/Hr) IV Continuous <Continuous>  lipid, fat emulsion (Fish Oil and Plant Based) 20% Infusion 0.641 Gm/kG/Day (20.83 mL/Hr) IV Continuous <Continuous>  Parenteral Nutrition - Adult 1 Each (63 mL/Hr) TPN Continuous <Continuous>    MEDICATIONS  (PRN):  HYDROmorphone  Injectable 0.25 milliGRAM(s) IV Push every 6 hours PRN Moderate Pain (4 - 6)  HYDROmorphone  Injectable 0.5 milliGRAM(s) IV Push every 6 hours PRN Severe Pain (7 - 10)  ondansetron Injectable 4 milliGRAM(s) IV Push every 6 hours PRN Nausea      Vital Signs Last 24 Hrs  T(C): 36.3 (24 Aug 2024 05:56), Max: 37.1 (23 Aug 2024 08:25)  T(F): 97.4 (24 Aug 2024 05:56), Max: 98.7 (23 Aug 2024 08:25)  HR: 80 (24 Aug 2024 05:56) (80 - 90)  BP: 118/76 (24 Aug 2024 05:56) (118/76 - 137/85)  BP(mean): --  RR: 16 (24 Aug 2024 05:56) (16 - 17)  SpO2: 96% (24 Aug 2024 05:56) (95% - 97%)    Parameters below as of 24 Aug 2024 05:56  Patient On (Oxygen Delivery Method): room air        Physical Exam:  Constitutional: A&Ox3, NAD  Respiratory: normal work of breathing  Cardiovascular: NSR, RRR  Abdomen: soft, non-tender, mildly distended, no rebound or gaurding  Incision: CDI  Legs: WWP, SCDs in place, no calf tenderness        I&O's Detail    22 Aug 2024 07:01  -  23 Aug 2024 07:00  --------------------------------------------------------  IN:    Fat Emulsion (Fish Oil &amp; Plant Based) 20% Infusion: 166.4 mL    Lactated Ringers: 320 mL    Oral Fluid: 480 mL    TPN (Total Parenteral Nutrition): 504 mL  Total IN: 1470.4 mL    OUT:    Voided (mL): 2680 mL  Total OUT: 2680 mL    Total NET: -1209.6 mL      23 Aug 2024 07:01  -  24 Aug 2024 06:20  --------------------------------------------------------  IN:    Fat Emulsion (Fish Oil &amp; Plant Based) 20% Infusion: 104 mL    Lactated Ringers: 600 mL    TPN (Total Parenteral Nutrition): 945 mL  Total IN: 1649 mL    OUT:    Oral Fluid: 0 mL    Voided (mL): 1650 mL  Total OUT: 1650 mL    Total NET: -1 mL          LABS:                        13.7   19.45 )-----------( 272      ( 23 Aug 2024 06:08 )             42.3     08-23    133<L>  |  101  |  10  ----------------------------<  143<H>  4.5   |  24  |  0.60    Ca    8.8      23 Aug 2024 06:08  Phos  2.6     08-23  Mg     2.0     08-23        Urinalysis Basic - ( 23 Aug 2024 06:08 )    Color: x / Appearance: x / SG: x / pH: x  Gluc: 143 mg/dL / Ketone: x  / Bili: x / Urobili: x   Blood: x / Protein: x / Nitrite: x   Leuk Esterase: x / RBC: x / WBC x   Sq Epi: x / Non Sq Epi: x / Bacteria: x        RADIOLOGY & ADDITIONAL STUDIES:

## 2024-08-24 NOTE — PROGRESS NOTE ADULT - SUBJECTIVE AND OBJECTIVE BOX
Medicine Progress Note (INCOMPLETE)    SUBJECTIVE / OVERNIGHT EVENTS:  O/N events:  Patient was seen and examined at bedside.  Otherwise negative ROS    MEDICATIONS  (STANDING):  acetaminophen   IVPB .. 1000 milliGRAM(s) IV Intermittent once  chlorhexidine 2% Cloths 1 Application(s) Topical <User Schedule>  heparin   Injectable 5000 Unit(s) SubCutaneous every 8 hours  lactated ringers. 1000 milliLiter(s) (40 mL/Hr) IV Continuous <Continuous>  lipid, fat emulsion (Fish Oil and Plant Based) 20% Infusion 0.641 Gm/kG/Day (20.83 mL/Hr) IV Continuous <Continuous>  Parenteral Nutrition - Adult 1 Each (63 mL/Hr) TPN Continuous <Continuous>    MEDICATIONS  (PRN):  HYDROmorphone  Injectable 0.25 milliGRAM(s) IV Push every 6 hours PRN Moderate Pain (4 - 6)  HYDROmorphone  Injectable 0.5 milliGRAM(s) IV Push every 6 hours PRN Severe Pain (7 - 10)  ondansetron Injectable 4 milliGRAM(s) IV Push every 6 hours PRN Nausea    CAPILLARY BLOOD GLUCOSE            OBJECTIVE:  Vital Signs Last 24 Hrs  T(C): 36.3 (24 Aug 2024 05:56), Max: 37.1 (23 Aug 2024 08:25)  T(F): 97.4 (24 Aug 2024 05:56), Max: 98.7 (23 Aug 2024 08:25)  HR: 80 (24 Aug 2024 05:56) (80 - 90)  BP: 118/76 (24 Aug 2024 05:56) (118/76 - 137/85)  BP(mean): --  RR: 16 (24 Aug 2024 05:56) (16 - 17)  SpO2: 96% (24 Aug 2024 05:56) (95% - 97%)    Parameters below as of 24 Aug 2024 05:56  Patient On (Oxygen Delivery Method): room air        PHYSICAL EXAM:  General: AAOx3, NAD, euvolemic  Head: NC/AT; MMM; PERRL  Neck: Supple; no JVD  Respiratory: CTAB; no wheezes/rales/rhonchi  Cardiovascular: Regular rhythm/rate; S1/S2+, no m/r/g auscultated  Gastrointestinal: Soft; TTP ND; hypoactive bowel sounds normal and present in all 4 quadrants  Extremities: WWP; no b/l U/E edema, no b/l L/E edema  Neurological: CNII-XII grossly intact; no obvious focal deficits    I&O's Summary    23 Aug 2024 07:01  -  24 Aug 2024 07:00  --------------------------------------------------------  IN: 1649 mL / OUT: 1650 mL / NET: -1 mL        LABS:                        13.2   12.24 )-----------( 268      ( 24 Aug 2024 05:30 )             40.9     08-24    132<L>  |  97  |  10  ----------------------------<  125<H>  4.2   |  25  |  0.61    Ca    9.3      24 Aug 2024 05:30  Phos  4.2     08-24  Mg     2.0     08-24            Urinalysis Basic - ( 24 Aug 2024 05:30 )    Color: x / Appearance: x / SG: x / pH: x  Gluc: 125 mg/dL / Ketone: x  / Bili: x / Urobili: x   Blood: x / Protein: x / Nitrite: x   Leuk Esterase: x / RBC: x / WBC x   Sq Epi: x / Non Sq Epi: x / Bacteria: x            RADIOLOGY & ADDITIONAL TESTS:  Imaging from Last 24 Hours: Medicine Progress Note    SUBJECTIVE / OVERNIGHT EVENTS:  Patient was seen and examined at bedside had liquid diet yesterday and reports good tolerance. Reports he overall feels much better. Walked in hallway last night. Denies passing gas or having bowel movements. Reports improvement in burping  Otherwise negative ROS    MEDICATIONS  (STANDING):  acetaminophen   IVPB .. 1000 milliGRAM(s) IV Intermittent once  chlorhexidine 2% Cloths 1 Application(s) Topical <User Schedule>  heparin   Injectable 5000 Unit(s) SubCutaneous every 8 hours  lactated ringers. 1000 milliLiter(s) (40 mL/Hr) IV Continuous <Continuous>  lipid, fat emulsion (Fish Oil and Plant Based) 20% Infusion 0.641 Gm/kG/Day (20.83 mL/Hr) IV Continuous <Continuous>  Parenteral Nutrition - Adult 1 Each (63 mL/Hr) TPN Continuous <Continuous>    MEDICATIONS  (PRN):  HYDROmorphone  Injectable 0.25 milliGRAM(s) IV Push every 6 hours PRN Moderate Pain (4 - 6)  HYDROmorphone  Injectable 0.5 milliGRAM(s) IV Push every 6 hours PRN Severe Pain (7 - 10)  ondansetron Injectable 4 milliGRAM(s) IV Push every 6 hours PRN Nausea    CAPILLARY BLOOD GLUCOSE            OBJECTIVE:  Vital Signs Last 24 Hrs  T(C): 36.3 (24 Aug 2024 05:56), Max: 37.1 (23 Aug 2024 08:25)  T(F): 97.4 (24 Aug 2024 05:56), Max: 98.7 (23 Aug 2024 08:25)  HR: 80 (24 Aug 2024 05:56) (80 - 90)  BP: 118/76 (24 Aug 2024 05:56) (118/76 - 137/85)  BP(mean): --  RR: 16 (24 Aug 2024 05:56) (16 - 17)  SpO2: 96% (24 Aug 2024 05:56) (95% - 97%)    Parameters below as of 24 Aug 2024 05:56  Patient On (Oxygen Delivery Method): room air        PHYSICAL EXAM:  General: AAOx3, NAD, euvolemic  Head: NC/AT; MMM; PERRL  Neck: Supple; no JVD  Respiratory: CTAB; no wheezes/rales/rhonchi  Cardiovascular: Regular rhythm/rate; S1/S2+, no m/r/g auscultated  Gastrointestinal: Soft; TTP ND; hypoactive bowel sounds normal and present in all 4 quadrants  Extremities: WWP; no b/l U/E edema, no b/l L/E edema  Neurological: CNII-XII grossly intact; no obvious focal deficits    I&O's Summary    23 Aug 2024 07:01  -  24 Aug 2024 07:00  --------------------------------------------------------  IN: 1649 mL / OUT: 1650 mL / NET: -1 mL        LABS:                        13.2   12.24 )-----------( 268      ( 24 Aug 2024 05:30 )             40.9     08-24    132<L>  |  97  |  10  ----------------------------<  125<H>  4.2   |  25  |  0.61    Ca    9.3      24 Aug 2024 05:30  Phos  4.2     08-24  Mg     2.0     08-24            Urinalysis Basic - ( 24 Aug 2024 05:30 )    Color: x / Appearance: x / SG: x / pH: x  Gluc: 125 mg/dL / Ketone: x  / Bili: x / Urobili: x   Blood: x / Protein: x / Nitrite: x   Leuk Esterase: x / RBC: x / WBC x   Sq Epi: x / Non Sq Epi: x / Bacteria: x            RADIOLOGY & ADDITIONAL TESTS:  Imaging from Last 24 Hours:

## 2024-08-25 LAB
ANION GAP SERPL CALC-SCNC: 9 MMOL/L — SIGNIFICANT CHANGE UP (ref 5–17)
BUN SERPL-MCNC: 12 MG/DL — SIGNIFICANT CHANGE UP (ref 7–23)
CALCIUM SERPL-MCNC: 8.9 MG/DL — SIGNIFICANT CHANGE UP (ref 8.4–10.5)
CHLORIDE SERPL-SCNC: 100 MMOL/L — SIGNIFICANT CHANGE UP (ref 96–108)
CO2 SERPL-SCNC: 28 MMOL/L — SIGNIFICANT CHANGE UP (ref 22–31)
CREAT SERPL-MCNC: 0.66 MG/DL — SIGNIFICANT CHANGE UP (ref 0.5–1.3)
EGFR: 116 ML/MIN/1.73M2 — SIGNIFICANT CHANGE UP
GLUCOSE SERPL-MCNC: 116 MG/DL — HIGH (ref 70–99)
HCT VFR BLD CALC: 40.6 % — SIGNIFICANT CHANGE UP (ref 39–50)
HGB BLD-MCNC: 12.9 G/DL — LOW (ref 13–17)
MAGNESIUM SERPL-MCNC: 2 MG/DL — SIGNIFICANT CHANGE UP (ref 1.6–2.6)
MCHC RBC-ENTMCNC: 27.7 PG — SIGNIFICANT CHANGE UP (ref 27–34)
MCHC RBC-ENTMCNC: 31.8 GM/DL — LOW (ref 32–36)
MCV RBC AUTO: 87.3 FL — SIGNIFICANT CHANGE UP (ref 80–100)
NRBC # BLD: 0 /100 WBCS — SIGNIFICANT CHANGE UP (ref 0–0)
PHOSPHATE SERPL-MCNC: 4.3 MG/DL — SIGNIFICANT CHANGE UP (ref 2.5–4.5)
PLATELET # BLD AUTO: 281 K/UL — SIGNIFICANT CHANGE UP (ref 150–400)
POTASSIUM SERPL-MCNC: 4 MMOL/L — SIGNIFICANT CHANGE UP (ref 3.5–5.3)
POTASSIUM SERPL-SCNC: 4 MMOL/L — SIGNIFICANT CHANGE UP (ref 3.5–5.3)
PYRIDOXAL PHOS SERPL-MCNC: 10.7 UG/L — SIGNIFICANT CHANGE UP (ref 3.4–65.2)
RBC # BLD: 4.65 M/UL — SIGNIFICANT CHANGE UP (ref 4.2–5.8)
RBC # FLD: 13.9 % — SIGNIFICANT CHANGE UP (ref 10.3–14.5)
SODIUM SERPL-SCNC: 137 MMOL/L — SIGNIFICANT CHANGE UP (ref 135–145)
WBC # BLD: 8.04 K/UL — SIGNIFICANT CHANGE UP (ref 3.8–10.5)
WBC # FLD AUTO: 8.04 K/UL — SIGNIFICANT CHANGE UP (ref 3.8–10.5)

## 2024-08-25 PROCEDURE — 99232 SBSQ HOSP IP/OBS MODERATE 35: CPT

## 2024-08-25 RX ORDER — ACETAMINOPHEN 325 MG/1
1000 TABLET ORAL EVERY 6 HOURS
Refills: 0 | Status: COMPLETED | OUTPATIENT
Start: 2024-08-25 | End: 2024-08-26

## 2024-08-25 RX ORDER — I.V. FAT EMULSION 20 G/100ML
0.64 EMULSION INTRAVENOUS
Qty: 50 | Refills: 0 | Status: DISCONTINUED | OUTPATIENT
Start: 2024-08-25 | End: 2024-08-25

## 2024-08-25 RX ORDER — ELECTROLYTE SOLUTION,INJ
1 VIAL (ML) INTRAVENOUS
Refills: 0 | Status: DISCONTINUED | OUTPATIENT
Start: 2024-08-25 | End: 2024-08-25

## 2024-08-25 RX ADMIN — ACETAMINOPHEN 1000 MILLIGRAM(S): 325 TABLET ORAL at 08:35

## 2024-08-25 RX ADMIN — Medication 5000 UNIT(S): at 22:29

## 2024-08-25 RX ADMIN — ACETAMINOPHEN 400 MILLIGRAM(S): 325 TABLET ORAL at 14:00

## 2024-08-25 RX ADMIN — Medication 5000 UNIT(S): at 06:13

## 2024-08-25 RX ADMIN — ACETAMINOPHEN 400 MILLIGRAM(S): 325 TABLET ORAL at 02:27

## 2024-08-25 RX ADMIN — ACETAMINOPHEN 400 MILLIGRAM(S): 325 TABLET ORAL at 22:28

## 2024-08-25 RX ADMIN — ACETAMINOPHEN 400 MILLIGRAM(S): 325 TABLET ORAL at 08:05

## 2024-08-25 RX ADMIN — CHLORHEXIDINE GLUCONATE 1 APPLICATION(S): 40 SOLUTION TOPICAL at 06:13

## 2024-08-25 RX ADMIN — Medication 5000 UNIT(S): at 14:00

## 2024-08-25 RX ADMIN — I.V. FAT EMULSION 20.8 GM/KG/DAY: 20 EMULSION INTRAVENOUS at 17:20

## 2024-08-25 RX ADMIN — Medication 1 EACH: at 17:25

## 2024-08-25 NOTE — PROGRESS NOTE ADULT - SUBJECTIVE AND OBJECTIVE BOX
RUPINDER VEGA , [id number],  [facility] [id number]    Time of encounter : 10 am  less abdominal pain  small bowel movement and passing gas.      T(C): 36.9 (08-25-24 @ 14:31), Max: 36.9 (08-24-24 @ 17:05)  HR: 73 (08-25-24 @ 14:31) (72 - 82)  BP: 123/75 (08-25-24 @ 14:31) (122/76 - 139/80)  RR: 18 (08-25-24 @ 14:31) (17 - 18)  SpO2: 96% (08-25-24 @ 14:31) (95% - 97%)    acetaminophen   IVPB .. 1000 milliGRAM(s) IV Intermittent every 6 hours  chlorhexidine 2% Cloths 1 Application(s) Topical <User Schedule>  heparin   Injectable 5000 Unit(s) SubCutaneous every 8 hours  HYDROmorphone  Injectable 0.25 milliGRAM(s) IV Push every 6 hours PRN  HYDROmorphone  Injectable 0.5 milliGRAM(s) IV Push every 6 hours PRN  lactated ringers. 1000 milliLiter(s) IV Continuous <Continuous>  lipid, fat emulsion (Fish Oil and Plant Based) 20% Infusion 0.641 Gm/kG/Day IV Continuous <Continuous>  ondansetron Injectable 4 milliGRAM(s) IV Push every 6 hours PRN  Parenteral Nutrition - Adult 1 Each TPN Continuous <Continuous>  Parenteral Nutrition - Adult 1 Each TPN Continuous <Continuous>      Physical Exam :    General exam :  well built, not in distress, saturating well on room air.  CVS : RRR no murmur,   Lungs : good air entry bilaterally   Abdomen : BS sluggish, mild distension.  Extremities: no edema, no tenderness.  PIcc line- right arm for TPN.   Neuro : AAO x 3 non focal.  Skin : warm and dry.   :  no beck.      CBC Full  -  ( 25 Aug 2024 05:30 )  WBC Count : 8.04 K/uL  RBC Count : 4.65 M/uL  Hemoglobin : 12.9 g/dL  Hematocrit : 40.6 %  Platelet Count - Automated : 281 K/uL  Mean Cell Volume : 87.3 fl  Mean Cell Hemoglobin : 27.7 pg  Mean Cell Hemoglobin Concentration : 31.8 gm/dL  Auto Neutrophil # : x  Auto Lymphocyte # : x  Auto Monocyte # : x  Auto Eosinophil # : x  Auto Basophil # : x  Auto Neutrophil % : x  Auto Lymphocyte % : x  Auto Monocyte % : x  Auto Eosinophil % : x  Auto Basophil % : x        08-25    137  |  100  |  12  ----------------------------<  116<H>  4.0   |  28  |  0.66    Ca    8.9      25 Aug 2024 05:30  Phos  4.3     08-25  Mg     2.0     08-25    TPro  x   /  Alb  x   /  TBili  x   /  DBili  <0.2  /  AST  x   /  ALT  x   /  AlkPhos  x   08-24    Daily     Daily   CAPILLARY BLOOD GLUCOSE          Urinalysis Basic - ( 25 Aug 2024 05:30 )    Color: x / Appearance: x / SG: x / pH: x  Gluc: 116 mg/dL / Ketone: x  / Bili: x / Urobili: x   Blood: x / Protein: x / Nitrite: x   Leuk Esterase: x / RBC: x / WBC x   Sq Epi: x / Non Sq Epi: x / Bacteria: x

## 2024-08-25 NOTE — PROGRESS NOTE ADULT - ASSESSMENT
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 for abdominal pain. Patient afebrile, HD normal on presentation without acute laboratory abnormalaties noted. CTAP concerning for closed loop obstruction with two distinct transition points. Now s/p dx lap, lysis of adhesions (8/11). Colonoscpy 8/20 significant for ileo-colonic anastamosis stricture. Now s/p ex lap and ileocolic anastamosis (8/21).    NPO w/ limited sips/IVF  PICC/TPN  Pain/nausea control PRN  HSQ/SCDs/IS/OOBA  AM labs      Plan discussed with attending and chief resident.   ____________________________________________________  Kylah Garcia - Resident   Surgery

## 2024-08-25 NOTE — PROGRESS NOTE ADULT - ASSESSMENT
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 for abdominal pain. Patient afebrile, HD normal on presentation without acute laboratory abnormalaties noted. CTAP concerning for closed loop obstruction with two distinct transition points. Now s/p dx lap, lysis of adhesions (8/11). Medicine consulted for indeterminant quantiferon result,   intervally - started on TPN 8/16, ( PICC line on right arm by IR )_  CT enterography 8/16-reviewed- with possible partial sbo, colonoscopy 8/20- stricture ,   sp lap-, Excision of part of colon and excision of terminal ileum with ileocolic anastomosis 8/21-       - dx lap cailin 8/11- ( pod # 14) , Excision of part of colon and excision of terminal ileum with ileocolic anastomosis 8/21- pod # 4  -  have small bowel and passing gas .   - diet per surgery - sips/chips   - TPN. ( via single lumen picc line on right arm placed by IR 8/16  -  holding off on steroids(I given methylprednisone 20 mg iv q 8 hourly- )   - CRP normal now.- daily CRP 7.7 on 8/16--> 9.6 on 8/17 --> 7. on 8/18-  wnl on 8/19-  - fu lytes and to ensure K, Mg and Phos are all wnl.   - can give Tylenol IV for pain  - avoid opoids, avoid NSAID.   - wbc 19 ( acute reaction-- clinically appear well  - Mild hyponatremia     - fu repeat Quantiferon test, fu PPD -negative.   - prealbumin 17 on 8/16    Vitamin d insufficiency - 23.7 on 8/16  when allow to take oral would need Vitamin D supplement.   B12- 755 on 8/16- adequate     - dvt prophylasix - on heparin sq    Thank you for allowing medicine to participate in the care, will follow.   Dr. Marcus Schwarz covering 8/26-8/30/24

## 2024-08-25 NOTE — PROGRESS NOTE ADULT - SUBJECTIVE AND OBJECTIVE BOX
HX: POD 4 s/p dx lap exlap with resection of strictured ileocolic anastomosis and new ileocolic anastamosis    SUBJECTIVE: Patient seen and examined bedside by chief resident. Patient reports feeling well this morning and passing gas and a small bowel movement. Denies nausea, vomiting, CP, SOB.    heparin   Injectable 5000 Unit(s) SubCutaneous every 8 hours    MEDICATIONS  (PRN):  HYDROmorphone  Injectable 0.25 milliGRAM(s) IV Push every 6 hours PRN Moderate Pain (4 - 6)  HYDROmorphone  Injectable 0.5 milliGRAM(s) IV Push every 6 hours PRN Severe Pain (7 - 10)  ondansetron Injectable 4 milliGRAM(s) IV Push every 6 hours PRN Nausea      I&O's Detail    24 Aug 2024 07:01  -  25 Aug 2024 07:00  --------------------------------------------------------  IN:    Fat Emulsion (Fish Oil &amp; Plant Based) 20% Infusion: 228.8 mL    IV PiggyBack: 100 mL    Lactated Ringers: 800 mL    TPN (Total Parenteral Nutrition): 1260 mL  Total IN: 2388.8 mL    OUT:    Voided (mL): 1600 mL  Total OUT: 1600 mL    Total NET: 788.8 mL          Vital Signs Last 24 Hrs  T(C): 36.3 (25 Aug 2024 05:30), Max: 36.9 (24 Aug 2024 17:05)  T(F): 97.4 (25 Aug 2024 05:30), Max: 98.4 (24 Aug 2024 17:05)  HR: 72 (25 Aug 2024 05:30) (72 - 83)  BP: 127/78 (25 Aug 2024 05:30) (117/75 - 139/80)  BP(mean): 94 (25 Aug 2024 05:30) (94 - 94)  RR: 17 (25 Aug 2024 05:30) (16 - 18)  SpO2: 96% (25 Aug 2024 05:30) (95% - 97%)    Parameters below as of 25 Aug 2024 05:30  Patient On (Oxygen Delivery Method): room air        PHYSICAL EXAM:   Gen: NAD, resting comfortably   CV: NSR  Pulm: no respiratory distress on RA, CTAB   Abd: Soft, mildly distended, NTTP, no rebound or guarding. Incision C/D/I   Ext: WWP, no edema   Neuro: motor/sensory grossly intact     LABS:                        12.9   8.04  )-----------( 281      ( 25 Aug 2024 05:30 )             40.6     08-25    137  |  100  |  12  ----------------------------<  116<H>  4.0   |  28  |  0.66    Ca    8.9      25 Aug 2024 05:30  Phos  4.3     08-25  Mg     2.0     08-25    TPro  x   /  Alb  x   /  TBili  x   /  DBili  <0.2  /  AST  x   /  ALT  x   /  AlkPhos  x   08-24        CAPILLARY BLOOD GLUCOSE            Urinalysis Basic - ( 25 Aug 2024 05:30 )    Color: x / Appearance: x / SG: x / pH: x  Gluc: 116 mg/dL / Ketone: x  / Bili: x / Urobili: x   Blood: x / Protein: x / Nitrite: x   Leuk Esterase: x / RBC: x / WBC x   Sq Epi: x / Non Sq Epi: x / Bacteria: x             RADIOLOGY & ADDITIONAL STUDIES:     HX: POD 4 s/p dx lap exlap with resection of strictured ileocolic anastomosis and new ileocolic anastomosis    SUBJECTIVE: Patient seen and examined bedside by chief resident. Patient reports feeling well this morning and passing gas and a small bowel movement. Denies nausea, vomiting, CP, SOB.    heparin   Injectable 5000 Unit(s) SubCutaneous every 8 hours    MEDICATIONS  (PRN):  HYDROmorphone  Injectable 0.25 milliGRAM(s) IV Push every 6 hours PRN Moderate Pain (4 - 6)  HYDROmorphone  Injectable 0.5 milliGRAM(s) IV Push every 6 hours PRN Severe Pain (7 - 10)  ondansetron Injectable 4 milliGRAM(s) IV Push every 6 hours PRN Nausea      I&O's Detail    24 Aug 2024 07:01  -  25 Aug 2024 07:00  --------------------------------------------------------  IN:    Fat Emulsion (Fish Oil &amp; Plant Based) 20% Infusion: 228.8 mL    IV PiggyBack: 100 mL    Lactated Ringers: 800 mL    TPN (Total Parenteral Nutrition): 1260 mL  Total IN: 2388.8 mL    OUT:    Voided (mL): 1600 mL  Total OUT: 1600 mL    Total NET: 788.8 mL          Vital Signs Last 24 Hrs  T(C): 36.3 (25 Aug 2024 05:30), Max: 36.9 (24 Aug 2024 17:05)  T(F): 97.4 (25 Aug 2024 05:30), Max: 98.4 (24 Aug 2024 17:05)  HR: 72 (25 Aug 2024 05:30) (72 - 83)  BP: 127/78 (25 Aug 2024 05:30) (117/75 - 139/80)  BP(mean): 94 (25 Aug 2024 05:30) (94 - 94)  RR: 17 (25 Aug 2024 05:30) (16 - 18)  SpO2: 96% (25 Aug 2024 05:30) (95% - 97%)    Parameters below as of 25 Aug 2024 05:30  Patient On (Oxygen Delivery Method): room air        PHYSICAL EXAM:   Gen: NAD, resting comfortably   CV: NSR  Pulm: no respiratory distress on RA, CTAB   Abd: Soft, mildly distended, NTTP, no rebound or guarding. Incision C/D/I   Ext: WWP, no edema   Neuro: motor/sensory grossly intact     LABS:                        12.9   8.04  )-----------( 281      ( 25 Aug 2024 05:30 )             40.6     08-25    137  |  100  |  12  ----------------------------<  116<H>  4.0   |  28  |  0.66    Ca    8.9      25 Aug 2024 05:30  Phos  4.3     08-25  Mg     2.0     08-25    TPro  x   /  Alb  x   /  TBili  x   /  DBili  <0.2  /  AST  x   /  ALT  x   /  AlkPhos  x   08-24        CAPILLARY BLOOD GLUCOSE            Urinalysis Basic - ( 25 Aug 2024 05:30 )    Color: x / Appearance: x / SG: x / pH: x  Gluc: 116 mg/dL / Ketone: x  / Bili: x / Urobili: x   Blood: x / Protein: x / Nitrite: x   Leuk Esterase: x / RBC: x / WBC x   Sq Epi: x / Non Sq Epi: x / Bacteria: x             RADIOLOGY & ADDITIONAL STUDIES:     HX: POD 4 s/p ex lap with resection of strictured ileocolic anastomosis and new ileocolic anastomosis    SUBJECTIVE: Patient seen and examined bedside by chief resident. Patient reports feeling well this morning and passing gas and a small bowel movement. Denies nausea, vomiting, CP, SOB.    heparin   Injectable 5000 Unit(s) SubCutaneous every 8 hours    MEDICATIONS  (PRN):  HYDROmorphone  Injectable 0.25 milliGRAM(s) IV Push every 6 hours PRN Moderate Pain (4 - 6)  HYDROmorphone  Injectable 0.5 milliGRAM(s) IV Push every 6 hours PRN Severe Pain (7 - 10)  ondansetron Injectable 4 milliGRAM(s) IV Push every 6 hours PRN Nausea      I&O's Detail    24 Aug 2024 07:01  -  25 Aug 2024 07:00  --------------------------------------------------------  IN:    Fat Emulsion (Fish Oil &amp; Plant Based) 20% Infusion: 228.8 mL    IV PiggyBack: 100 mL    Lactated Ringers: 800 mL    TPN (Total Parenteral Nutrition): 1260 mL  Total IN: 2388.8 mL    OUT:    Voided (mL): 1600 mL  Total OUT: 1600 mL    Total NET: 788.8 mL          Vital Signs Last 24 Hrs  T(C): 36.3 (25 Aug 2024 05:30), Max: 36.9 (24 Aug 2024 17:05)  T(F): 97.4 (25 Aug 2024 05:30), Max: 98.4 (24 Aug 2024 17:05)  HR: 72 (25 Aug 2024 05:30) (72 - 83)  BP: 127/78 (25 Aug 2024 05:30) (117/75 - 139/80)  BP(mean): 94 (25 Aug 2024 05:30) (94 - 94)  RR: 17 (25 Aug 2024 05:30) (16 - 18)  SpO2: 96% (25 Aug 2024 05:30) (95% - 97%)    Parameters below as of 25 Aug 2024 05:30  Patient On (Oxygen Delivery Method): room air        PHYSICAL EXAM:   Gen: NAD, resting comfortably   CV: NSR  Pulm: no respiratory distress on RA, CTAB   Abd: Soft, mildly distended, NTTP, no rebound or guarding. Incision C/D/I   Ext: WWP, no edema   Neuro: motor/sensory grossly intact     LABS:                        12.9   8.04  )-----------( 281      ( 25 Aug 2024 05:30 )             40.6     08-25    137  |  100  |  12  ----------------------------<  116<H>  4.0   |  28  |  0.66    Ca    8.9      25 Aug 2024 05:30  Phos  4.3     08-25  Mg     2.0     08-25    TPro  x   /  Alb  x   /  TBili  x   /  DBili  <0.2  /  AST  x   /  ALT  x   /  AlkPhos  x   08-24        CAPILLARY BLOOD GLUCOSE            Urinalysis Basic - ( 25 Aug 2024 05:30 )    Color: x / Appearance: x / SG: x / pH: x  Gluc: 116 mg/dL / Ketone: x  / Bili: x / Urobili: x   Blood: x / Protein: x / Nitrite: x   Leuk Esterase: x / RBC: x / WBC x   Sq Epi: x / Non Sq Epi: x / Bacteria: x             RADIOLOGY & ADDITIONAL STUDIES:

## 2024-08-25 NOTE — PROGRESS NOTE ADULT - ATTENDING COMMENTS
Reports some flatus, ? small pellet of BM  Wants to drink  AFVSS  Abd soft, mod distended, improved from yesterday. Incision clean    A/P: Ileus s/p Ileocolic anastomosis resection.    1. Sips of liquids  2. Ambulate  3. Not taking much opioids

## 2024-08-26 LAB
ANION GAP SERPL CALC-SCNC: 4 MMOL/L — LOW (ref 5–17)
BUN SERPL-MCNC: 15 MG/DL — SIGNIFICANT CHANGE UP (ref 7–23)
CALCIUM SERPL-MCNC: 9.4 MG/DL — SIGNIFICANT CHANGE UP (ref 8.4–10.5)
CHLORIDE SERPL-SCNC: 103 MMOL/L — SIGNIFICANT CHANGE UP (ref 96–108)
CO2 SERPL-SCNC: 30 MMOL/L — SIGNIFICANT CHANGE UP (ref 22–31)
CREAT SERPL-MCNC: 0.67 MG/DL — SIGNIFICANT CHANGE UP (ref 0.5–1.3)
EGFR: 116 ML/MIN/1.73M2 — SIGNIFICANT CHANGE UP
GLUCOSE SERPL-MCNC: 120 MG/DL — HIGH (ref 70–99)
HCT VFR BLD CALC: 40.6 % — SIGNIFICANT CHANGE UP (ref 39–50)
HGB BLD-MCNC: 13 G/DL — SIGNIFICANT CHANGE UP (ref 13–17)
MAGNESIUM SERPL-MCNC: 2.2 MG/DL — SIGNIFICANT CHANGE UP (ref 1.6–2.6)
MCHC RBC-ENTMCNC: 27.3 PG — SIGNIFICANT CHANGE UP (ref 27–34)
MCHC RBC-ENTMCNC: 32 GM/DL — SIGNIFICANT CHANGE UP (ref 32–36)
MCV RBC AUTO: 85.3 FL — SIGNIFICANT CHANGE UP (ref 80–100)
NRBC # BLD: 0 /100 WBCS — SIGNIFICANT CHANGE UP (ref 0–0)
PHOSPHATE SERPL-MCNC: 4.1 MG/DL — SIGNIFICANT CHANGE UP (ref 2.5–4.5)
PLATELET # BLD AUTO: 293 K/UL — SIGNIFICANT CHANGE UP (ref 150–400)
POTASSIUM SERPL-MCNC: 4.3 MMOL/L — SIGNIFICANT CHANGE UP (ref 3.5–5.3)
POTASSIUM SERPL-SCNC: 4.3 MMOL/L — SIGNIFICANT CHANGE UP (ref 3.5–5.3)
RBC # BLD: 4.76 M/UL — SIGNIFICANT CHANGE UP (ref 4.2–5.8)
RBC # FLD: 13.6 % — SIGNIFICANT CHANGE UP (ref 10.3–14.5)
SODIUM SERPL-SCNC: 137 MMOL/L — SIGNIFICANT CHANGE UP (ref 135–145)
SURGICAL PATHOLOGY STUDY: SIGNIFICANT CHANGE UP
WBC # BLD: 7.15 K/UL — SIGNIFICANT CHANGE UP (ref 3.8–10.5)
WBC # FLD AUTO: 7.15 K/UL — SIGNIFICANT CHANGE UP (ref 3.8–10.5)

## 2024-08-26 PROCEDURE — 99232 SBSQ HOSP IP/OBS MODERATE 35: CPT | Mod: GC

## 2024-08-26 RX ORDER — I.V. FAT EMULSION 20 G/100ML
0.64 EMULSION INTRAVENOUS
Qty: 50 | Refills: 0 | Status: DISCONTINUED | OUTPATIENT
Start: 2024-08-26 | End: 2024-08-27

## 2024-08-26 RX ORDER — CHLORHEXIDINE GLUCONATE 40 MG/ML
1 SOLUTION TOPICAL
Refills: 0 | Status: DISCONTINUED | OUTPATIENT
Start: 2024-08-26 | End: 2024-08-27

## 2024-08-26 RX ORDER — SODIUM CHLORIDE 9 MG/ML
10 INJECTION INTRAMUSCULAR; INTRAVENOUS; SUBCUTANEOUS
Refills: 0 | Status: DISCONTINUED | OUTPATIENT
Start: 2024-08-26 | End: 2024-08-27

## 2024-08-26 RX ORDER — ACETAMINOPHEN 325 MG/1
1000 TABLET ORAL ONCE
Refills: 0 | Status: DISCONTINUED | OUTPATIENT
Start: 2024-08-26 | End: 2024-08-27

## 2024-08-26 RX ORDER — ELECTROLYTE SOLUTION,INJ
1 VIAL (ML) INTRAVENOUS
Refills: 0 | Status: DISCONTINUED | OUTPATIENT
Start: 2024-08-26 | End: 2024-08-27

## 2024-08-26 RX ADMIN — Medication 5000 UNIT(S): at 23:09

## 2024-08-26 RX ADMIN — ACETAMINOPHEN 400 MILLIGRAM(S): 325 TABLET ORAL at 09:49

## 2024-08-26 RX ADMIN — I.V. FAT EMULSION 20.83 GM/KG/DAY: 20 EMULSION INTRAVENOUS at 17:36

## 2024-08-26 RX ADMIN — Medication 1 EACH: at 17:36

## 2024-08-26 RX ADMIN — CHLORHEXIDINE GLUCONATE 1 APPLICATION(S): 40 SOLUTION TOPICAL at 06:48

## 2024-08-26 RX ADMIN — Medication 5000 UNIT(S): at 06:43

## 2024-08-26 RX ADMIN — Medication 5000 UNIT(S): at 15:06

## 2024-08-26 NOTE — PROGRESS NOTE ADULT - ATTENDING COMMENTS
49M with PMHx of Crohn's disease (diagnosed in 2021, not currently on medications) and PSHx of SBR of terminal ileum (10 years ago) who presents to [facility] for abdominal pain. Patient afebrile, HD normal on presentation without acute laboratory abnormalities noted. CTAP concerning for closed loop obstruction with two distinct transition points. Now s/p dx lap, lysis of adhesions (8/11). Medicine consulted for indeterminant quantiferon result,   interval- started on TPN 8/16, ( PICC line on right arm by IR )_  CT enterography 8/16-reviewed- with possible partial sbo, colonoscopy 8/20- stricture ,   sp lap-, Excision of part of colon and excision of terminal ileum with ileocolic anastomosis 8/21-     CC: Pt seen w. Dr. Arroyo, Reports mild abd pain relief on Tylenol. Reports lipid from TPN gave him the metalic taste? Denies N/V, + flatus and BMs     - s/p dx lap cailin (8/11) pod # 15  - s/p excision of part of colon and excision of terminal ileum with ileocolic anastomosis (8/21) pod #5    - CLD as toleratef per surgery   - c/w TPN  via single lumen picc line on right arm placed by IR 8/16  -  holding off on steroids( given methylprednisolone 20 mg iv q 8 hourly- )   - CRP normal now.- daily CRP 7.7 on 8/16--> 9.6 on 8/17 --> 7. on 8/18-  wnl on 8/19-  - fu lytes and to ensure K, Mg and Phos are all wnl.   - Tylenol IV for pain  - avoid opioids   - Leukocytosis resolved, WBC 7.15K   - Mild hyponatremia ( resolved) Na 137    - fu repeat Quantiferon test, fu PPD -negative.   - prealbumin 17 on 8/16    Vitamin d insufficiency - 23.7 on 8/16  when allow to take oral would need Vitamin D supplement.   B12- 755 on 8/16- adequate     - dvt prophylaxis - on heparin sq    Thank you for allowing medicine to participate in the care, will follow.

## 2024-08-26 NOTE — PROGRESS NOTE ADULT - ASSESSMENT
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 for abdominal pain. Patient afebrile, HD normal on presentation without acute laboratory abnormalaties noted. CTAP concerning for closed loop obstruction with two distinct transition points. Now s/p dx lap, lysis of adhesions (8/11). Colonoscpy 8/20 significant for ileo-colonic anastamosis stricture. Now s/p ex lap and ileocolic anastamosis (8/21).    CLD  PICC/TPN  Pain/nausea control PRN  HSQ/SCDs/IS/OOBA  AM labs

## 2024-08-26 NOTE — PROGRESS NOTE ADULT - ASSESSMENT
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 for abdominal pain. Patient afebrile, HD normal on presentation without acute laboratory abnormalaties noted. CTAP concerning for closed loop obstruction with two distinct transition points. Now s/p dx lap, lysis of adhesions (8/11). Medicine consulted for indeterminant quantiferon result.    #Crohn's disease w/ ileocolonic anastamosis stricture sp dx lap with lysis of adhesions (8/11), ileocolonic anastamosis revision (small bowel to transverse colon) 8/21  #Indeterminant Quant Result  Patient underwent TB testing per GI in preparation for possible immunosuppressive therapy, currently on methylpred IV 20mg TID  Hep B Ag, Ab, and Core Ab negative  Indeterminant result likely as a result from incorrect collection of specimen, patient has no risk factors for TB, no known exposures.  - cscope showed ileo-colonic anastamosis stricture sp ileocolic with stapled anastamosis 8/22, currently being maintained on TPN. F/u nutrition recs  - noted to be on dilaudid pain regimen, cw ofirmev PRN. If insuffiencient pain control can trial toradol injectable 15 mg q6h PRN for 3 days  - pt with BM and passing flatus, continue to encourage OOBTC as tolerated  - Repeat quant TB specimen received and pending, however skin PPD test was negative - no further action required.  - Rest of care per surgery and GI    Case discussed with Dr. Schwarz. Medicine will continue to follow

## 2024-08-26 NOTE — PROGRESS NOTE ADULT - ATTENDING COMMENTS
CRS Attending  Seen and examined on morning rounds  Denies complaints.  Denies abdominal pain, nausea or vomiting.  Reports passing flatus and had soft BM.  Afebrile  Gen NAD  Abdomen soft, mild distension, nontender  advance diet as tolerated   OOB, ambulate

## 2024-08-26 NOTE — PROGRESS NOTE ADULT - SUBJECTIVE AND OBJECTIVE BOX
Medicine Progress Note    SUBJECTIVE / OVERNIGHT EVENTS:  Patient was seen and examined at bedside reporting BM this AM and passing flatus. Reports some postprandial abdominal pain well relieved by ofirmev. Endorses he has been ambulating in hallway.  Otherwise negative ROS    MEDICATIONS  (STANDING):  acetaminophen   IVPB .. 1000 milliGRAM(s) IV Intermittent every 6 hours  chlorhexidine 2% Cloths 1 Application(s) Topical <User Schedule>  chlorhexidine 4% Liquid 1 Application(s) Topical <User Schedule>  heparin   Injectable 5000 Unit(s) SubCutaneous every 8 hours  lactated ringers. 1000 milliLiter(s) (40 mL/Hr) IV Continuous <Continuous>  Parenteral Nutrition - Adult 1 Each (63 mL/Hr) TPN Continuous <Continuous>    MEDICATIONS  (PRN):  HYDROmorphone  Injectable 0.25 milliGRAM(s) IV Push every 6 hours PRN Moderate Pain (4 - 6)  HYDROmorphone  Injectable 0.5 milliGRAM(s) IV Push every 6 hours PRN Severe Pain (7 - 10)  ondansetron Injectable 4 milliGRAM(s) IV Push every 6 hours PRN Nausea  sodium chloride 0.9% lock flush 10 milliLiter(s) IV Push every 1 hour PRN Pre/post blood products, medications, blood draw, and to maintain line patency    CAPILLARY BLOOD GLUCOSE            OBJECTIVE:  Vital Signs Last 24 Hrs  T(C): 36.9 (26 Aug 2024 08:05), Max: 36.9 (25 Aug 2024 14:31)  T(F): 98.5 (26 Aug 2024 08:05), Max: 98.5 (25 Aug 2024 14:31)  HR: 82 (26 Aug 2024 08:05) (73 - 82)  BP: 104/69 (26 Aug 2024 08:05) (104/69 - 130/73)  BP(mean): --  RR: 18 (26 Aug 2024 08:05) (18 - 19)  SpO2: 95% (26 Aug 2024 08:05) (92% - 96%)    Parameters below as of 26 Aug 2024 08:05  Patient On (Oxygen Delivery Method): room air        PHYSICAL EXAM:  General: AAOx3, NAD, euvolemic  Head: NC/AT; MMM; PERRL  Neck: Supple; no JVD  Respiratory: CTAB; no wheezes/rales/rhonchi  Cardiovascular: Regular rhythm/rate; S1/S2+, no m/r/g auscultated  Gastrointestinal: Soft; NTND; bowel sounds normal and present in all 4 quadrants, midline scar cdi, well approximated no erythema/discharge  Extremities: WWP; no b/l U/E edema, no b/l L/E edema  Neurological: CNII-XII grossly intact; no obvious focal deficits  I&O's Summary    25 Aug 2024 07:01  -  26 Aug 2024 07:00  --------------------------------------------------------  IN: 2618.6 mL / OUT: 600 mL / NET: 2018.6 mL    26 Aug 2024 07:01  -  26 Aug 2024 12:04  --------------------------------------------------------  IN: 309 mL / OUT: 0 mL / NET: 309 mL        LABS:                        13.0   7.15  )-----------( 293      ( 26 Aug 2024 08:26 )             40.6     08-26    137  |  103  |  15  ----------------------------<  120<H>  4.3   |  30  |  0.67    Ca    9.4      26 Aug 2024 08:26  Phos  4.1     08-26  Mg     2.2     08-26            Urinalysis Basic - ( 26 Aug 2024 08:26 )    Color: x / Appearance: x / SG: x / pH: x  Gluc: 120 mg/dL / Ketone: x  / Bili: x / Urobili: x   Blood: x / Protein: x / Nitrite: x   Leuk Esterase: x / RBC: x / WBC x   Sq Epi: x / Non Sq Epi: x / Bacteria: x            RADIOLOGY & ADDITIONAL TESTS:  Imaging from Last 24 Hours:

## 2024-08-26 NOTE — CHART NOTE - NSCHARTNOTEFT_GEN_A_CORE
Admitting Diagnosis:   Patient is a 49y old  Male who presents with a chief complaint of abdominal pain (26 Aug 2024 12:03)      PAST MEDICAL & SURGICAL HISTORY:  Crohn's disease      S/P small bowel resection          Current Nutrition Order:  Clear Liquid, Kosher   TPN via PICC: 341g Dextrose, 85g AA, 50g 20% Lipids      PO Intake: Good (%) [   ]  Fair (50-75%) [   ] Poor (<25%) [ x ]    GI Issues: pt denies GI distress, however noted with abdominal discomfort per flowsheets; last BM 8/25     Skin Integrity: surgical incision to abdomen, no edema noted; Nabil score 20        08-25-24 @ 07:01  -  08-26-24 @ 07:00  --------------------------------------------------------  IN: 2618.6 mL / OUT: 600 mL / NET: 2018.6 mL    08-26-24 @ 07:01  -  08-26-24 @ 14:13  --------------------------------------------------------  IN: 309 mL / OUT: 0 mL / NET: 309 mL        Labs:   08-26    137  |  103  |  15  ----------------------------<  120<H>  4.3   |  30  |  0.67    Ca    9.4      26 Aug 2024 08:26  Phos  4.1     08-26  Mg     2.2     08-26      CAPILLARY BLOOD GLUCOSE          Medications:  MEDICATIONS  (STANDING):  acetaminophen   IVPB .. 1000 milliGRAM(s) IV Intermittent every 6 hours  chlorhexidine 2% Cloths 1 Application(s) Topical <User Schedule>  chlorhexidine 4% Liquid 1 Application(s) Topical <User Schedule>  heparin   Injectable 5000 Unit(s) SubCutaneous every 8 hours  lactated ringers. 1000 milliLiter(s) (40 mL/Hr) IV Continuous <Continuous>  lipid, fat emulsion (Fish Oil and Plant Based) 20% Infusion 0.641 Gm/kG/Day (20.83 mL/Hr) IV Continuous <Continuous>  Parenteral Nutrition - Adult 1 Each (63 mL/Hr) TPN Continuous <Continuous>  Parenteral Nutrition - Adult 1 Each (63 mL/Hr) TPN Continuous <Continuous>    MEDICATIONS  (PRN):  HYDROmorphone  Injectable 0.25 milliGRAM(s) IV Push every 6 hours PRN Moderate Pain (4 - 6)  HYDROmorphone  Injectable 0.5 milliGRAM(s) IV Push every 6 hours PRN Severe Pain (7 - 10)  ondansetron Injectable 4 milliGRAM(s) IV Push every 6 hours PRN Nausea  sodium chloride 0.9% lock flush 10 milliLiter(s) IV Push every 1 hour PRN Pre/post blood products, medications, blood draw, and to maintain line patency      Anthropometrics:  Dosing height: 65in  Dosing weight: 77.6kg/171 pounds  BMI 28.4   pounds, %%    Weight Change: no new weights since admit    [Severe Protein Calorie Malnutrition: Risk Assessment Completed 8/16]  - NFPE: moderate to severe muscle and fat wasting to temples, clavicles, shoulders, dorsal hand, orbital, triceps, buccals    Estimated energy needs:   Estimated Energy Needs From (kathy/kg)	30  Estimated Energy Needs To (kathy/kg)	35  Estimated Energy Needs Calculated From (kathy/kg)	1848  Estimated Energy Needs Calculated To (kathy/kg)	2156    Estimated Protein Needs From (g/kg)	1.2  Estimated Protein Needs To (g/kg)	1.5  Estimated Protein Needs Calculated From (g/kg)	73.92  Estimated Protein Needs Calculated To (g/kg)	92.4    Estimated Fluid Needs From (ml/kg)	25  Estimated Fluid Needs To (ml/kg)	30  Estimated Fluid Needs Calculated From (ml/kg)	1540  Estimated Fluid Needs Calculated To (ml/kg)	1848  Other Calculations	Ideal body weight (61.6kg) used to calculate energy needs due to pt's current body weight exceeds  % (125%) ideal body weight. Needs adjusted for malnutrition.    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 Patient afebrile, HD normal on presentation without acute laboratory abnormalaties noted. CTAP concerning for closed loop obstruction with two distinct transition points. Now s/p dx lap, lysis of adhesions (8/11). Colonoscopy 8/20 significant for ileo-colonic anastamosis stricture. Now s/p Excision of part of colon and excision of terminal ileum with ileocolic anastomosis 8/21 nausea after drinking, resolved on its own 8/22 +abdominal pain and distention, made NPO 8/23 adv to sips, tolerating well 8/25 still distended and occasional flatus 8/26 +F, +small BM, adv to CLD    Pt seen for follow up. Continues on TPN as primary means to nutrition/hydration. Diet advanced to clear liquids, however pt with limited intake. Consumed a few sips of broth, apple juice, and jello for breakfast this AM. Pt denies n/v/d/c, however noted with abdominal discomfort per flowsheets- will continue to monitor. RD provided education on continued use of parenteral nutrition until diet advanced and pt can meet needs PO. Pt verbalized appreciation and understanding. Labs and medications reviewed. Electrolytes WDL, glucose 116-120 x 24 hours. Ordered for LR @ 40mL/hr, zofran. RD to remain available for additional nutrition interventions as needed.     Previous Nutrition Diagnosis: Severe Acute on Chronic Malnutrition related to inadequate PO intake to meet physiological demand for nutrients as evidenced by Severe/Moderate muscle/fat depletions    Active [ x ]  Resolved [   ]    Goal: Pt to meet at least 75% of nutritional needs consistently via most appropriate route for nutrition     Recommendations:  1. Continue TPN via PICC: 341g Dextrose, 85g AA, 50g 20% Lipids to provide in total 1999Kcal, 85g protein, GIR of 3.0 based on actual body weight (78kg), 1.38grams protein/kg ideal body weight (61.6kg)  >>Check Magnesium, Phosphorus, Potassium daily and POC BG Q6hrs. Trend daily weights. Fluids and electrolytes per MD discretion.  >>Weekly lipid panel while on TPN, hold lipids if TG>400  2. Diet advancement per team. Once advanced to full liquids, consider initiation of calorie count to assess %PO intake.   3. Pain and bowel regimen per team.   4. RD to remain available for additional nutrition interventions and diet edu as needed.    Education: continued use of parenteral nutrition until pt can meet needs PO     Risk Level: High [ x ] Moderate [   ] Low [   ]

## 2024-08-26 NOTE — PROGRESS NOTE ADULT - SUBJECTIVE AND OBJECTIVE BOX
SUBJECTIVE: Patient seen this morning with chief resident resting comfortably in bed with no complaints. States he had a bowel movement this morning and is passing flatus. Denies nausea and vomiting. He has been tolerating sips of water. He is out of bed ambulating and voiding appropriately.       MEDICATIONS  (STANDING):  acetaminophen   IVPB .. 1000 milliGRAM(s) IV Intermittent every 6 hours  chlorhexidine 2% Cloths 1 Application(s) Topical <User Schedule>  heparin   Injectable 5000 Unit(s) SubCutaneous every 8 hours  lactated ringers. 1000 milliLiter(s) (40 mL/Hr) IV Continuous <Continuous>  lipid, fat emulsion (Fish Oil and Plant Based) 20% Infusion 0.641 Gm/kG/Day (20.8 mL/Hr) IV Continuous <Continuous>  Parenteral Nutrition - Adult 1 Each (63 mL/Hr) TPN Continuous <Continuous>    MEDICATIONS  (PRN):  HYDROmorphone  Injectable 0.25 milliGRAM(s) IV Push every 6 hours PRN Moderate Pain (4 - 6)  HYDROmorphone  Injectable 0.5 milliGRAM(s) IV Push every 6 hours PRN Severe Pain (7 - 10)  ondansetron Injectable 4 milliGRAM(s) IV Push every 6 hours PRN Nausea      Vital Signs Last 24 Hrs  T(C): 36.6 (26 Aug 2024 05:19), Max: 36.9 (25 Aug 2024 14:31)  T(F): 97.8 (26 Aug 2024 05:19), Max: 98.5 (25 Aug 2024 14:31)  HR: 76 (26 Aug 2024 05:19) (73 - 81)  BP: 130/73 (26 Aug 2024 05:19) (123/75 - 130/73)  BP(mean): --  RR: 18 (26 Aug 2024 05:19) (18 - 19)  SpO2: 96% (26 Aug 2024 05:19) (92% - 97%)    Parameters below as of 26 Aug 2024 05:19  Patient On (Oxygen Delivery Method): room air        PHYSICAL EXAM:    Constitutional: A&Ox3    Respiratory: non labored breathing, no respiratory distress    Gastrointestinal: soft, nontender, mildly distended                 Incision: CDI    Extremities: (-) edema                  I&O's Detail    25 Aug 2024 07:01  -  26 Aug 2024 07:00  --------------------------------------------------------  IN:    Fat Emulsion (Fish Oil &amp; Plant Based) 20% Infusion: 41.6 mL    Lactated Ringers: 640 mL    TPN (Total Parenteral Nutrition): 693 mL  Total IN: 1374.6 mL    OUT:    Voided (mL): 600 mL  Total OUT: 600 mL    Total NET: 774.6 mL          LABS:                        12.9   8.04  )-----------( 281      ( 25 Aug 2024 05:30 )             40.6     08-25    137  |  100  |  12  ----------------------------<  116<H>  4.0   |  28  |  0.66    Ca    8.9      25 Aug 2024 05:30  Phos  4.3     08-25  Mg     2.0     08-25        Urinalysis Basic - ( 25 Aug 2024 05:30 )    Color: x / Appearance: x / SG: x / pH: x  Gluc: 116 mg/dL / Ketone: x  / Bili: x / Urobili: x   Blood: x / Protein: x / Nitrite: x   Leuk Esterase: x / RBC: x / WBC x   Sq Epi: x / Non Sq Epi: x / Bacteria: x        RADIOLOGY & ADDITIONAL STUDIES:

## 2024-08-27 ENCOUNTER — TRANSCRIPTION ENCOUNTER (OUTPATIENT)
Age: 49
End: 2024-08-27

## 2024-08-27 VITALS
OXYGEN SATURATION: 97 % | HEART RATE: 75 BPM | TEMPERATURE: 98 F | RESPIRATION RATE: 17 BRPM | DIASTOLIC BLOOD PRESSURE: 76 MMHG | SYSTOLIC BLOOD PRESSURE: 119 MMHG

## 2024-08-27 LAB
ANION GAP SERPL CALC-SCNC: 10 MMOL/L — SIGNIFICANT CHANGE UP (ref 5–17)
BUN SERPL-MCNC: 12 MG/DL — SIGNIFICANT CHANGE UP (ref 7–23)
CALCIUM SERPL-MCNC: 8.9 MG/DL — SIGNIFICANT CHANGE UP (ref 8.4–10.5)
CHLORIDE SERPL-SCNC: 102 MMOL/L — SIGNIFICANT CHANGE UP (ref 96–108)
CO2 SERPL-SCNC: 25 MMOL/L — SIGNIFICANT CHANGE UP (ref 22–31)
CREAT SERPL-MCNC: 0.65 MG/DL — SIGNIFICANT CHANGE UP (ref 0.5–1.3)
EGFR: 117 ML/MIN/1.73M2 — SIGNIFICANT CHANGE UP
GLUCOSE SERPL-MCNC: 110 MG/DL — HIGH (ref 70–99)
HCT VFR BLD CALC: 38.4 % — LOW (ref 39–50)
HGB BLD-MCNC: 12.4 G/DL — LOW (ref 13–17)
MAGNESIUM SERPL-MCNC: 2 MG/DL — SIGNIFICANT CHANGE UP (ref 1.6–2.6)
MCHC RBC-ENTMCNC: 28.4 PG — SIGNIFICANT CHANGE UP (ref 27–34)
MCHC RBC-ENTMCNC: 32.3 GM/DL — SIGNIFICANT CHANGE UP (ref 32–36)
MCV RBC AUTO: 88.1 FL — SIGNIFICANT CHANGE UP (ref 80–100)
NRBC # BLD: 0 /100 WBCS — SIGNIFICANT CHANGE UP (ref 0–0)
PHOSPHATE SERPL-MCNC: 3.1 MG/DL — SIGNIFICANT CHANGE UP (ref 2.5–4.5)
PLATELET # BLD AUTO: 262 K/UL — SIGNIFICANT CHANGE UP (ref 150–400)
POTASSIUM SERPL-MCNC: 4 MMOL/L — SIGNIFICANT CHANGE UP (ref 3.5–5.3)
POTASSIUM SERPL-SCNC: 4 MMOL/L — SIGNIFICANT CHANGE UP (ref 3.5–5.3)
RBC # BLD: 4.36 M/UL — SIGNIFICANT CHANGE UP (ref 4.2–5.8)
RBC # FLD: 13.7 % — SIGNIFICANT CHANGE UP (ref 10.3–14.5)
SODIUM SERPL-SCNC: 137 MMOL/L — SIGNIFICANT CHANGE UP (ref 135–145)
TRIGL SERPL-MCNC: 87 MG/DL — SIGNIFICANT CHANGE UP
WBC # BLD: 6.16 K/UL — SIGNIFICANT CHANGE UP (ref 3.8–10.5)
WBC # FLD AUTO: 6.16 K/UL — SIGNIFICANT CHANGE UP (ref 3.8–10.5)

## 2024-08-27 PROCEDURE — C1769: CPT

## 2024-08-27 PROCEDURE — 36415 COLL VENOUS BLD VENIPUNCTURE: CPT

## 2024-08-27 PROCEDURE — 85027 COMPLETE CBC AUTOMATED: CPT

## 2024-08-27 PROCEDURE — 87389 HIV-1 AG W/HIV-1&-2 AB AG IA: CPT

## 2024-08-27 PROCEDURE — 80048 BASIC METABOLIC PNL TOTAL CA: CPT

## 2024-08-27 PROCEDURE — 74019 RADEX ABDOMEN 2 VIEWS: CPT

## 2024-08-27 PROCEDURE — 82525 ASSAY OF COPPER: CPT

## 2024-08-27 PROCEDURE — 84134 ASSAY OF PREALBUMIN: CPT

## 2024-08-27 PROCEDURE — 83993 ASSAY FOR CALPROTECTIN FECAL: CPT

## 2024-08-27 PROCEDURE — 86850 RBC ANTIBODY SCREEN: CPT

## 2024-08-27 PROCEDURE — 86140 C-REACTIVE PROTEIN: CPT

## 2024-08-27 PROCEDURE — C9399: CPT

## 2024-08-27 PROCEDURE — 81003 URINALYSIS AUTO W/O SCOPE: CPT

## 2024-08-27 PROCEDURE — 84207 ASSAY OF VITAMIN B-6: CPT

## 2024-08-27 PROCEDURE — 82248 BILIRUBIN DIRECT: CPT

## 2024-08-27 PROCEDURE — 99285 EMERGENCY DEPT VISIT HI MDM: CPT | Mod: 25

## 2024-08-27 PROCEDURE — 84630 ASSAY OF ZINC: CPT

## 2024-08-27 PROCEDURE — 82607 VITAMIN B-12: CPT

## 2024-08-27 PROCEDURE — 82728 ASSAY OF FERRITIN: CPT

## 2024-08-27 PROCEDURE — 96374 THER/PROPH/DIAG INJ IV PUSH: CPT

## 2024-08-27 PROCEDURE — 88307 TISSUE EXAM BY PATHOLOGIST: CPT

## 2024-08-27 PROCEDURE — 84443 ASSAY THYROID STIM HORMONE: CPT

## 2024-08-27 PROCEDURE — 87340 HEPATITIS B SURFACE AG IA: CPT

## 2024-08-27 PROCEDURE — 84255 ASSAY OF SELENIUM: CPT

## 2024-08-27 PROCEDURE — 87086 URINE CULTURE/COLONY COUNT: CPT

## 2024-08-27 PROCEDURE — 82962 GLUCOSE BLOOD TEST: CPT

## 2024-08-27 PROCEDURE — 85610 PROTHROMBIN TIME: CPT

## 2024-08-27 PROCEDURE — C1889: CPT

## 2024-08-27 PROCEDURE — 96376 TX/PRO/DX INJ SAME DRUG ADON: CPT

## 2024-08-27 PROCEDURE — 99232 SBSQ HOSP IP/OBS MODERATE 35: CPT | Mod: GC

## 2024-08-27 PROCEDURE — 86901 BLOOD TYPING SEROLOGIC RH(D): CPT

## 2024-08-27 PROCEDURE — 84100 ASSAY OF PHOSPHORUS: CPT

## 2024-08-27 PROCEDURE — 82746 ASSAY OF FOLIC ACID SERUM: CPT

## 2024-08-27 PROCEDURE — 74177 CT ABD & PELVIS W/CONTRAST: CPT | Mod: MC

## 2024-08-27 PROCEDURE — 86706 HEP B SURFACE ANTIBODY: CPT

## 2024-08-27 PROCEDURE — 86900 BLOOD TYPING SEROLOGIC ABO: CPT

## 2024-08-27 PROCEDURE — 82306 VITAMIN D 25 HYDROXY: CPT

## 2024-08-27 PROCEDURE — 96361 HYDRATE IV INFUSION ADD-ON: CPT

## 2024-08-27 PROCEDURE — 86480 TB TEST CELL IMMUN MEASURE: CPT

## 2024-08-27 PROCEDURE — 83970 ASSAY OF PARATHORMONE: CPT

## 2024-08-27 PROCEDURE — 93005 ELECTROCARDIOGRAM TRACING: CPT

## 2024-08-27 PROCEDURE — 36573 INSJ PICC RS&I 5 YR+: CPT

## 2024-08-27 PROCEDURE — 71045 X-RAY EXAM CHEST 1 VIEW: CPT

## 2024-08-27 PROCEDURE — 83036 HEMOGLOBIN GLYCOSYLATED A1C: CPT

## 2024-08-27 PROCEDURE — 84425 ASSAY OF VITAMIN B-1: CPT

## 2024-08-27 PROCEDURE — 80061 LIPID PANEL: CPT

## 2024-08-27 PROCEDURE — 86704 HEP B CORE ANTIBODY TOTAL: CPT

## 2024-08-27 PROCEDURE — 85025 COMPLETE CBC W/AUTO DIFF WBC: CPT

## 2024-08-27 PROCEDURE — 83735 ASSAY OF MAGNESIUM: CPT

## 2024-08-27 PROCEDURE — 88305 TISSUE EXAM BY PATHOLOGIST: CPT

## 2024-08-27 PROCEDURE — C1726: CPT

## 2024-08-27 PROCEDURE — 84590 ASSAY OF VITAMIN A: CPT

## 2024-08-27 PROCEDURE — 84478 ASSAY OF TRIGLYCERIDES: CPT

## 2024-08-27 PROCEDURE — 83605 ASSAY OF LACTIC ACID: CPT

## 2024-08-27 PROCEDURE — 80053 COMPREHEN METABOLIC PANEL: CPT

## 2024-08-27 PROCEDURE — 83550 IRON BINDING TEST: CPT

## 2024-08-27 PROCEDURE — 83540 ASSAY OF IRON: CPT

## 2024-08-27 PROCEDURE — 83690 ASSAY OF LIPASE: CPT

## 2024-08-27 PROCEDURE — 85730 THROMBOPLASTIN TIME PARTIAL: CPT

## 2024-08-27 RX ORDER — ACETAMINOPHEN 325 MG/1
2 TABLET ORAL
Qty: 56 | Refills: 0
Start: 2024-08-27 | End: 2024-09-02

## 2024-08-27 RX ADMIN — CHLORHEXIDINE GLUCONATE 1 APPLICATION(S): 40 SOLUTION TOPICAL at 06:32

## 2024-08-27 RX ADMIN — Medication 5000 UNIT(S): at 06:31

## 2024-08-27 NOTE — PROGRESS NOTE ADULT - ATTENDING SUPERVISION STATEMENT
Resident
Fellow
Resident
Fellow
Resident

## 2024-08-27 NOTE — PROGRESS NOTE ADULT - ASSESSMENT
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 for abdominal pain. Patient afebrile, HD normal on presentation without acute laboratory abnormalaties noted. CTAP concerning for closed loop obstruction with two distinct transition points. Now s/p dx lap, lysis of adhesions (8/11). Medicine consulted for indeterminant quantiferon result.    #Crohn's disease w/ ileocolonic anastamosis stricture sp dx lap with lysis of adhesions (8/11), ileocolonic anastamosis revision (small bowel to transverse colon) 8/21  #Indeterminant Quant Result  Patient underwent TB testing per GI in preparation for possible immunosuppressive therapy, currently on methylpred IV 20mg TID  Hep B Ag, Ab, and Core Ab negative  Indeterminant result likely as a result from incorrect collection of specimen, patient has no risk factors for TB, no known exposures.  - cscope showed ileo-colonic anastamosis stricture sp ileocolic with stapled anastamosis 8/22, currently being maintained on TPN. F/u nutrition recs  - noted to be on dilaudid pain regimen, cw ofirmev PRN  - pt with BM and passing flatus, continue to encourage OOBTC as tolerated  - Repeat quant TB specimen received and pending, however skin PPD test was negative - no further action required.  - Rest of care per surgery and GI    Case discussed with Dr. Schwarz. Medicine will continue to follow

## 2024-08-27 NOTE — PROGRESS NOTE ADULT - PROVIDER SPECIALTY LIST ADULT
Gastroenterology
Gastroenterology
Hospitalist
Hospitalist
Internal Medicine
Surgery
Gastroenterology
Surgery
Colorectal Surgery
Gastroenterology
Internal Medicine
Surgery
Gastroenterology
Internal Medicine
Internal Medicine
Surgery
Internal Medicine

## 2024-08-27 NOTE — PROGRESS NOTE ADULT - NUTRITIONAL ASSESSMENT
This patient has been assessed with a concern for Malnutrition and has been determined to have a diagnosis/diagnoses of Severe protein-calorie malnutrition.    This patient is being managed with:   Parenteral Nutrition - Adult-  Entered: Aug 19 2024  5:00PM    lipid fat emulsion (Fish Oil and Plant Based) 20% Infusion-[Known as SMOFLIPID 20% Infusion]  50 Gram(s) in IV Solution 250 milliLiter(s) infuse at 20.8 mL/Hr  Dose Rate: 0.641 Gm/kG/Day Infuse Over: 12 Hours; Stop After 12 Hours  Administration Instructions: Use 1.2 micron in-line filter  Entered: Aug 19 2024  5:00PM    Diet NPO after Midnight-     NPO Start Date: 19-Aug-2024   NPO Start Time: 23:59  Entered: Aug 19 2024  3:41PM    Diet Clear Liquid-  Kosher  Entered: Aug 17 2024  6:35PM  
This patient has been assessed with a concern for Malnutrition and has been determined to have a diagnosis/diagnoses of Severe protein-calorie malnutrition.    This patient is being managed with:   Parenteral Nutrition - Adult-  Entered: Aug 23 2024  5:00PM    lipid fat emulsion (Fish Oil and Plant Based) 20% Infusion-[Known as SMOFLIPID 20% Infusion]  50 Gram(s) in IV Solution 250 milliLiter(s) infuse at 20.83 mL/Hr  Dose Rate: 0.641 Gm/kG/Day Infuse Over: 12 Hours; Stop After 12 Hours  Administration Instructions: Use 1.2 micron in-line filter  Entered: Aug 23 2024  5:00PM    Diet NPO-  With Ice Chips/Sips of Water  Entered: Aug 23 2024  7:11AM  
This patient has been assessed with a concern for Malnutrition and has been determined to have a diagnosis/diagnoses of Severe protein-calorie malnutrition.    This patient is being managed with:   Parenteral Nutrition - Adult-  Entered: Aug 24 2024  5:00PM    lipid fat emulsion (Fish Oil and Plant Based) 20% Infusion-[Known as SMOFLIPID 20% Infusion]  50 Gram(s) in IV Solution 250 milliLiter(s) infuse at 20.8 mL/Hr  Dose Rate: 0.641 Gm/kG/Day Infuse Over: 12 Hours; Stop After 12 Hours  Administration Instructions: Use 1.2 micron in-line filter  Entered: Aug 24 2024  5:00PM    Diet NPO-  With Ice Chips/Sips of Water  Entered: Aug 23 2024  7:11AM  
This patient has been assessed with a concern for Malnutrition and has been determined to have a diagnosis/diagnoses of Severe protein-calorie malnutrition.    This patient is being managed with:   Diet NPO-  Entered: Aug 22 2024  7:08AM    Parenteral Nutrition - Adult-  Entered: Aug 21 2024  5:00PM    lipid fat emulsion (Fish Oil and Plant Based) 20% Infusion-[Known as SMOFLIPID 20% Infusion]  50 Gram(s) in IV Solution 250 milliLiter(s) infuse at 20.8 mL/Hr  Dose Rate: 0.641 Gm/kG/Day Infuse Over: 12 Hours; Stop After 12 Hours  Administration Instructions: Use 1.2 micron in-line filter  Entered: Aug 21 2024  5:00PM  
This patient has been assessed with a concern for Malnutrition and has been determined to have a diagnosis/diagnoses of Severe protein-calorie malnutrition.    This patient is being managed with:   Diet NPO-  With Ice Chips/Sips of Water  Entered: Aug 23 2024  7:11AM    Parenteral Nutrition - Adult-  Entered: Aug 22 2024  5:00PM    lipid fat emulsion (Fish Oil and Plant Based) 20% Infusion-[Known as SMOFLIPID 20% Infusion]  50 Gram(s) in IV Solution 250 milliLiter(s) infuse at 20.83 mL/Hr  Dose Rate: 0.641 Gm/kG/Day Infuse Over: 12 Hours; Stop After 12 Hours  Administration Instructions: Use 1.2 micron in-line filter  Entered: Aug 22 2024  5:00PM  
This patient has been assessed with a concern for Malnutrition and has been determined to have a diagnosis/diagnoses of Severe protein-calorie malnutrition.    This patient is being managed with:   Parenteral Nutrition - Adult-  Entered: Aug 20 2024  5:00PM    lipid fat emulsion (Fish Oil and Plant Based) 20% Infusion-[Known as SMOFLIPID 20% Infusion]  50 Gram(s) in IV Solution 250 milliLiter(s) infuse at 20.8 mL/Hr  Dose Rate: 0.641 Gm/kG/Day Infuse Over: 12 Hours; Stop After 12 Hours  Administration Instructions: Use 1.2 micron in-line filter  Entered: Aug 20 2024  5:00PM    Diet NPO after Midnight-     NPO Start Date: 20-Aug-2024   NPO Start Time: 23:59  Entered: Aug 20 2024  1:21PM    Parenteral Nutrition - Adult-  Entered: Aug 19 2024  5:00PM    Diet Clear Liquid-  Kosher  Entered: Aug 17 2024  6:35PM  
This patient has been assessed with a concern for Malnutrition and has been determined to have a diagnosis/diagnoses of Severe protein-calorie malnutrition.    This patient is being managed with:   Parenteral Nutrition - Adult-  Entered: Aug 22 2024  5:00PM    lipid fat emulsion (Fish Oil and Plant Based) 20% Infusion-[Known as SMOFLIPID 20% Infusion]  50 Gram(s) in IV Solution 250 milliLiter(s) infuse at 20.83 mL/Hr  Dose Rate: 0.641 Gm/kG/Day Infuse Over: 12 Hours; Stop After 12 Hours  Administration Instructions: Use 1.2 micron in-line filter  Entered: Aug 22 2024  5:00PM    Diet NPO-  Entered: Aug 22 2024  7:08AM  
This patient has been assessed with a concern for Malnutrition and has been determined to have a diagnosis/diagnoses of Severe protein-calorie malnutrition.    This patient is being managed with:   Parenteral Nutrition - Adult-  Entered: Aug 23 2024  5:00PM    lipid fat emulsion (Fish Oil and Plant Based) 20% Infusion-[Known as SMOFLIPID 20% Infusion]  50 Gram(s) in IV Solution 250 milliLiter(s) infuse at 20.83 mL/Hr  Dose Rate: 0.641 Gm/kG/Day Infuse Over: 12 Hours; Stop After 12 Hours  Administration Instructions: Use 1.2 micron in-line filter  Entered: Aug 23 2024  5:00PM    Diet NPO-  With Ice Chips/Sips of Water  Entered: Aug 23 2024  7:11AM  
This patient has been assessed with a concern for Malnutrition and has been determined to have a diagnosis/diagnoses of Severe protein-calorie malnutrition.    This patient is being managed with:   lipid fat emulsion (Fish Oil and Plant Based) 20% Infusion-[Known as SMOFLIPID 20% Infusion]  50 Gram(s) in IV Solution 250 milliLiter(s) infuse at 20.83 mL/Hr  Dose Rate: 0.7 Gm/kG/Day Infuse Over: 12 Hours; Stop After 12 Hours  Administration Instructions: Use 1.2 micron in-line filter  Entered: Aug 18 2024  5:00PM    Parenteral Nutrition - Adult-  Entered: Aug 18 2024  5:00PM    Diet Clear Liquid-  Kosher  Entered: Aug 17 2024  6:35PM  
This patient has been assessed with a concern for Malnutrition and has been determined to have a diagnosis/diagnoses of Severe protein-calorie malnutrition.    This patient is being managed with:   Diet Clear Liquid-  Kosher  Entered: Aug 17 2024  6:35PM    lipid fat emulsion (Fish Oil and Plant Based) 20% Infusion-[Known as SMOFLIPID 20% Infusion]  50 Gram(s) in IV Solution 250 milliLiter(s) infuse at 20.83 mL/Hr  Dose Rate: 0.8 Gm/kG/Day Infuse Over: 12 Hours; Stop After 12 Hours  Administration Instructions: Use 1.2 micron in-line filter  Entered: Aug 17 2024  5:00PM    Parenteral Nutrition - Adult-  Entered: Aug 17 2024  5:00PM  
This patient has been assessed with a concern for Malnutrition and has been determined to have a diagnosis/diagnoses of Severe protein-calorie malnutrition.    This patient is being managed with:   Parenteral Nutrition - Adult-  Entered: Aug 16 2024  5:00PM    lipid fat emulsion (Fish Oil and Plant Based) 20% Infusion-[Known as SMOFLIPID 20% Infusion]  50 Gram(s) in IV Solution 250 milliLiter(s) infuse at 20.8 mL/Hr  Dose Rate: 0.641 Gm/kG/Day Infuse Over: 12 Hours; Stop After 12 Hours  Administration Instructions: Use 1.2 micron in-line filter  Entered: Aug 16 2024  5:00PM    Diet NPO-  Entered: Aug 11 2024  4:53AM  
This patient has been assessed with a concern for Malnutrition and has been determined to have a diagnosis/diagnoses of Severe protein-calorie malnutrition.    This patient is being managed with:   Parenteral Nutrition - Adult-  Entered: Aug 20 2024  5:00PM    lipid fat emulsion (Fish Oil and Plant Based) 20% Infusion-[Known as SMOFLIPID 20% Infusion]  50 Gram(s) in IV Solution 250 milliLiter(s) infuse at 20.8 mL/Hr  Dose Rate: 0.641 Gm/kG/Day Infuse Over: 12 Hours; Stop After 12 Hours  Administration Instructions: Use 1.2 micron in-line filter  Entered: Aug 20 2024  5:00PM    Diet NPO after Midnight-     NPO Start Date: 20-Aug-2024   NPO Start Time: 23:59  Entered: Aug 20 2024  1:21PM    Diet Clear Liquid-  Kosher  Entered: Aug 17 2024  6:35PM  
This patient has been assessed with a concern for Malnutrition and has been determined to have a diagnosis/diagnoses of Severe protein-calorie malnutrition.    This patient is being managed with:   Parenteral Nutrition - Adult-  Entered: Aug 21 2024  5:00PM    lipid fat emulsion (Fish Oil and Plant Based) 20% Infusion-[Known as SMOFLIPID 20% Infusion]  50 Gram(s) in IV Solution 250 milliLiter(s) infuse at 20.8 mL/Hr  Dose Rate: 0.641 Gm/kG/Day Infuse Over: 12 Hours; Stop After 12 Hours  Administration Instructions: Use 1.2 micron in-line filter  Entered: Aug 21 2024  5:00PM    Diet Clear Liquid-  Kosher  Entered: Aug 21 2024  2:53PM    Parenteral Nutrition - Adult-  Entered: Aug 20 2024  5:00PM  
This patient has been assessed with a concern for Malnutrition and has been determined to have a diagnosis/diagnoses of Severe protein-calorie malnutrition.    This patient is being managed with:   Parenteral Nutrition - Adult-  Entered: Aug 22 2024  5:00PM    lipid fat emulsion (Fish Oil and Plant Based) 20% Infusion-[Known as SMOFLIPID 20% Infusion]  50 Gram(s) in IV Solution 250 milliLiter(s) infuse at 20.8 mL/Hr  Dose Rate: 0.641 Gm/kG/Day Infuse Over: 12 Hours; Stop After 12 Hours  Administration Instructions: Use 1.2 micron in-line filter  Entered: Aug 22 2024  5:00PM    Diet NPO-  Entered: Aug 22 2024  7:08AM    Parenteral Nutrition - Adult-  Entered: Aug 21 2024  5:00PM    lipid fat emulsion (Fish Oil and Plant Based) 20% Infusion-[Known as SMOFLIPID 20% Infusion]  50 Gram(s) in IV Solution 250 milliLiter(s) infuse at 20.8 mL/Hr  Dose Rate: 0.641 Gm/kG/Day Infuse Over: 12 Hours; Stop After 12 Hours  Administration Instructions: Use 1.2 micron in-line filter  Entered: Aug 21 2024  5:00PM  
This patient has been assessed with a concern for Malnutrition and has been determined to have a diagnosis/diagnoses of Severe protein-calorie malnutrition.    This patient is being managed with:   Diet Clear Liquid-  Kosher  Entered: Aug 26 2024  7:50AM    Parenteral Nutrition - Adult-  Entered: Aug 25 2024  5:00PM  
This patient has been assessed with a concern for Malnutrition and has been determined to have a diagnosis/diagnoses of Severe protein-calorie malnutrition.    This patient is being managed with:   Diet Low Fiber-  Kosher  Entered: Aug 26 2024  7:00PM  
This patient has been assessed with a concern for Malnutrition and has been determined to have a diagnosis/diagnoses of Severe protein-calorie malnutrition.    This patient is being managed with:   Diet NPO-  Entered: Aug 22 2024  7:08AM    Parenteral Nutrition - Adult-  Entered: Aug 21 2024  5:00PM    lipid fat emulsion (Fish Oil and Plant Based) 20% Infusion-[Known as SMOFLIPID 20% Infusion]  50 Gram(s) in IV Solution 250 milliLiter(s) infuse at 20.8 mL/Hr  Dose Rate: 0.641 Gm/kG/Day Infuse Over: 12 Hours; Stop After 12 Hours  Administration Instructions: Use 1.2 micron in-line filter  Entered: Aug 21 2024  5:00PM  
This patient has been assessed with a concern for Malnutrition and has been determined to have a diagnosis/diagnoses of Severe protein-calorie malnutrition.    This patient is being managed with:   Parenteral Nutrition - Adult-  Entered: Aug 21 2024  5:00PM    lipid fat emulsion (Fish Oil and Plant Based) 20% Infusion-[Known as SMOFLIPID 20% Infusion]  50 Gram(s) in IV Solution 250 milliLiter(s) infuse at 20.8 mL/Hr  Dose Rate: 0.641 Gm/kG/Day Infuse Over: 12 Hours; Stop After 12 Hours  Administration Instructions: Use 1.2 micron in-line filter  Entered: Aug 21 2024  5:00PM    Parenteral Nutrition - Adult-  Entered: Aug 20 2024  5:00PM  
This patient has been assessed with a concern for Malnutrition and has been determined to have a diagnosis/diagnoses of Severe protein-calorie malnutrition.    This patient is being managed with:   Parenteral Nutrition - Adult-  Entered: Aug 25 2024  5:00PM    lipid fat emulsion (Fish Oil and Plant Based) 20% Infusion-[Known as SMOFLIPID 20% Infusion]  50 Gram(s) in IV Solution 250 milliLiter(s) infuse at 20.8 mL/Hr  Dose Rate: 0.641 Gm/kG/Day Infuse Over: 12 Hours; Stop After 12 Hours  Administration Instructions: Use 1.2 micron in-line filter  Entered: Aug 25 2024  5:00PM    Diet NPO-  With Ice Chips/Sips of Water  Entered: Aug 23 2024  7:11AM

## 2024-08-27 NOTE — PROGRESS NOTE ADULT - ATTENDING COMMENTS
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 Patient afebrile, HD normal on presentation without acute laboratory abnormalities noted. CTAP concerning for closed loop obstruction with two distinct transition points. Now s/p dx lap, lysis of adhesions (8/11). Medicine consulted for indeterminant quantiferon result,   interval- started on TPN 8/16, ( PICC line on right arm by IR )_  CT enterography 8/16-reviewed- with possible partial sbo, colonoscopy 8/20- stricture ,   sp lap-, Excision of part of colon and excision of terminal ileum with ileocolic anastomosis 8/21-     CC: Pt seen w. Dr. Arroyo @ 9:40am. Tolerated LFD. Pt expressed he wants to go home today.     - s/p dx lap cailin (8/11) pod # 16  - s/p excision of part of colon and excision of terminal ileum with ileocolic anastomosis (8/21) pod #5    - LFD as tolerated per surgery, taper TPN off   -  single lumen picc line on right arm placed by IR 8/16  - holding off on steroids( given methylprednisolone 20 mg iv q 8 hourly- )   - CRP normal now.- daily CRP 7.7 on 8/16--> 9.6 on 8/17 --> 7. on 8/18-  wnl on 8/19-  - fu lytes and to ensure K, Mg and Phos are all wnl.   - Tylenol IV for pain  - avoid opioids   - Leukocytosis resolved, WBC 7.15K - .6.16K   - Mild hyponatremia ( resolved) Na 137    - fu repeat Quantiferon test, fu PPD -negative.   - prealbumin 17 on 8/16    Vitamin d insufficiency - 23.7 on 8/16  when allow to take oral would need Vitamin D supplement.   B12- 755 on 8/16- adequate     - dvt prophylaxis - on heparin sq    D/c plan per surgery     Thank you for allowing medicine to participate in the care.

## 2024-08-27 NOTE — PROGRESS NOTE ADULT - ASSESSMENT
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 for abdominal pain. Patient afebrile, HD normal on presentation without acute laboratory abnormalaties noted. CTAP concerning for closed loop obstruction with two distinct transition points. Now s/p dx lap, lysis of adhesions (8/11). Colonoscpy 8/20 significant for ileo-colonic anastamosis stricture. Now s/p ex lap and ileocolic anastamosis (8/21). Patient tolerating LFD, doing well, no abdominal pain, nausea or vomiting.    LFB  PICC  Pain/nausea control PRN  HSQ/SCDs/IS/OOBA  AM labs    Plan discussed with attending and chief resident.   ____________________________________________________  Kylah Garcia - Resident   Surgery

## 2024-08-27 NOTE — PROGRESS NOTE ADULT - SUBJECTIVE AND OBJECTIVE BOX
Medicine Progress Note     SUBJECTIVE / OVERNIGHT EVENTS:  Patient was seen and examined at bedside reporting 2 BMs yesterday, 1 this AM. Ambulating in hallway now being advanced to low fiber diet. Pt eager to go home. Denies abdominal pain when eating. Denies gas pain  Otherwise negative ROS    MEDICATIONS  (STANDING):  chlorhexidine 2% Cloths 1 Application(s) Topical <User Schedule>  chlorhexidine 4% Liquid 1 Application(s) Topical <User Schedule>  heparin   Injectable 5000 Unit(s) SubCutaneous every 8 hours  lactated ringers. 1000 milliLiter(s) (40 mL/Hr) IV Continuous <Continuous>    MEDICATIONS  (PRN):  acetaminophen   IVPB .. 1000 milliGRAM(s) IV Intermittent once PRN Mild Pain (1 - 3)  HYDROmorphone  Injectable 0.25 milliGRAM(s) IV Push every 6 hours PRN Moderate Pain (4 - 6)  HYDROmorphone  Injectable 0.5 milliGRAM(s) IV Push every 6 hours PRN Severe Pain (7 - 10)  ondansetron Injectable 4 milliGRAM(s) IV Push every 6 hours PRN Nausea  sodium chloride 0.9% lock flush 10 milliLiter(s) IV Push every 1 hour PRN Pre/post blood products, medications, blood draw, and to maintain line patency    CAPILLARY BLOOD GLUCOSE            OBJECTIVE:  Vital Signs Last 24 Hrs  T(C): 36.6 (27 Aug 2024 08:53), Max: 36.9 (26 Aug 2024 14:05)  T(F): 97.9 (27 Aug 2024 08:53), Max: 98.5 (26 Aug 2024 14:05)  HR: 75 (27 Aug 2024 08:53) (60 - 91)  BP: 119/76 (27 Aug 2024 08:53) (112/73 - 125/74)  BP(mean): --  RR: 17 (27 Aug 2024 08:53) (17 - 19)  SpO2: 97% (27 Aug 2024 08:53) (96% - 97%)    Parameters below as of 27 Aug 2024 08:53  Patient On (Oxygen Delivery Method): room air        PHYSICAL EXAM:  General: AAOx3, NAD, euvolemic  Head: NC/AT; MMM; PERRL  Neck: Supple; no JVD  Respiratory: CTAB; no wheezes/rales/rhonchi  Cardiovascular: Regular rhythm/rate; S1/S2+, no m/r/g auscultated  Gastrointestinal: Soft; NTND; bowel sounds normal and present in all 4 quadrants  Extremities: WWP; no b/l U/E edema, no b/l L/E edema  Neurological: CNII-XII grossly intact; no obvious focal deficits  I&O's Summary    26 Aug 2024 07:01  -  27 Aug 2024 07:00  --------------------------------------------------------  IN: 1587.6 mL / OUT: 500 mL / NET: 1087.6 mL    27 Aug 2024 07:01  -  27 Aug 2024 13:07  --------------------------------------------------------  IN: 520 mL / OUT: 0 mL / NET: 520 mL        LABS:                        12.4   6.16  )-----------( 262      ( 27 Aug 2024 05:30 )             38.4     08-27    137  |  102  |  12  ----------------------------<  110<H>  4.0   |  25  |  0.65    Ca    8.9      27 Aug 2024 05:30  Phos  3.1     08-27  Mg     2.0     08-27            Urinalysis Basic - ( 27 Aug 2024 05:30 )    Color: x / Appearance: x / SG: x / pH: x  Gluc: 110 mg/dL / Ketone: x  / Bili: x / Urobili: x   Blood: x / Protein: x / Nitrite: x   Leuk Esterase: x / RBC: x / WBC x   Sq Epi: x / Non Sq Epi: x / Bacteria: x            RADIOLOGY & ADDITIONAL TESTS:  Imaging from Last 24 Hours:

## 2024-08-27 NOTE — PROGRESS NOTE ADULT - SUBJECTIVE AND OBJECTIVE BOX
HX: POD 6 s/p ex lap and ileocolic anastomosis     SUBJECTIVE: Patient seen and examined bedside by chief resident. Patient refers feeling well this morning, and tolerating regular diet overnight. Patient denies pain, nausea, vomiting, SOB, CP.    heparin   Injectable 5000 Unit(s) SubCutaneous every 8 hours    MEDICATIONS  (PRN):  acetaminophen   IVPB .. 1000 milliGRAM(s) IV Intermittent once PRN Mild Pain (1 - 3)  HYDROmorphone  Injectable 0.25 milliGRAM(s) IV Push every 6 hours PRN Moderate Pain (4 - 6)  HYDROmorphone  Injectable 0.5 milliGRAM(s) IV Push every 6 hours PRN Severe Pain (7 - 10)  ondansetron Injectable 4 milliGRAM(s) IV Push every 6 hours PRN Nausea  sodium chloride 0.9% lock flush 10 milliLiter(s) IV Push every 1 hour PRN Pre/post blood products, medications, blood draw, and to maintain line patency      I&O's Detail    26 Aug 2024 07:01  -  27 Aug 2024 07:00  --------------------------------------------------------  IN:    Fat Emulsion (Fish Oil &amp; Plant Based) 20% Infusion: 145.6 mL    Lactated Ringers: 560 mL    TPN (Total Parenteral Nutrition): 882 mL  Total IN: 1587.6 mL    OUT:    Oral Fluid: 0 mL    Voided (mL): 500 mL  Total OUT: 500 mL    Total NET: 1087.6 mL          Vital Signs Last 24 Hrs  T(C): 36.5 (27 Aug 2024 05:02), Max: 36.9 (26 Aug 2024 08:05)  T(F): 97.7 (27 Aug 2024 05:02), Max: 98.5 (26 Aug 2024 08:05)  HR: 82 (27 Aug 2024 05:02) (60 - 91)  BP: 121/66 (27 Aug 2024 05:02) (104/69 - 125/74)  BP(mean): --  RR: 18 (27 Aug 2024 05:02) (17 - 19)  SpO2: 97% (27 Aug 2024 05:02) (95% - 97%)    Parameters below as of 27 Aug 2024 05:02  Patient On (Oxygen Delivery Method): room air        PHYSICAL EXAM:   Gen: NAD, resting comfortably   CV: NSR  Pulm: no respiratory distress on RA, CTAB   Abd: soft, mildly distended, NTTP, no rebound or guarding   Ext: WWP, no edema   Neuro: motor/sensory grossly intact     LABS:                        13.0   7.15  )-----------( 293      ( 26 Aug 2024 08:26 )             40.6     08-26    137  |  103  |  15  ----------------------------<  120<H>  4.3   |  30  |  0.67    Ca    9.4      26 Aug 2024 08:26  Phos  4.1     08-26  Mg     2.2     08-26          CAPILLARY BLOOD GLUCOSE            Urinalysis Basic - ( 26 Aug 2024 08:26 )    Color: x / Appearance: x / SG: x / pH: x  Gluc: 120 mg/dL / Ketone: x  / Bili: x / Urobili: x   Blood: x / Protein: x / Nitrite: x   Leuk Esterase: x / RBC: x / WBC x   Sq Epi: x / Non Sq Epi: x / Bacteria: x

## 2024-08-27 NOTE — PROGRESS NOTE ADULT - REASON FOR ADMISSION
abdominal pain
No

## 2024-08-27 NOTE — PROGRESS NOTE ADULT - ATTENDING COMMENTS
CRS Attending  Seen and examined on morning rounds.   Tolerating diet for dinner last night and breakfast this morning.  No nausea or vomiting. No abdominal pain  Having flatus and BMs  Abdomen soft nontender  dc to home  outpatient f/u 1-2 weeks  also outpatient f/u with GI  All questions answered.

## 2024-08-27 NOTE — DISCHARGE NOTE NURSING/CASE MANAGEMENT/SOCIAL WORK - PATIENT PORTAL LINK FT
You can access the FollowMyHealth Patient Portal offered by [facility] by registering at the following website: [email or web address]. By joining [facility]’s FollowMyHealth portal, you will also be able to view your health information using other applications (apps) compatible with our system.

## 2024-08-30 DIAGNOSIS — K91.89 OTHER POSTPROCEDURAL COMPLICATIONS AND DISORDERS OF DIGESTIVE SYSTEM: ICD-10-CM

## 2024-08-30 DIAGNOSIS — K56.609 UNSPECIFIED INTESTINAL OBSTRUCTION, UNSPECIFIED AS TO PARTIAL VERSUS COMPLETE OBSTRUCTION: ICD-10-CM

## 2024-08-30 DIAGNOSIS — E83.42 HYPOMAGNESEMIA: ICD-10-CM

## 2024-08-30 DIAGNOSIS — Z53.31 LAPAROSCOPIC SURGICAL PROCEDURE CONVERTED TO OPEN PROCEDURE: ICD-10-CM

## 2024-08-30 DIAGNOSIS — R18.8 OTHER ASCITES: ICD-10-CM

## 2024-08-30 DIAGNOSIS — E87.1 HYPO-OSMOLALITY AND HYPONATREMIA: ICD-10-CM

## 2024-08-30 DIAGNOSIS — E43 UNSPECIFIED SEVERE PROTEIN-CALORIE MALNUTRITION: ICD-10-CM

## 2024-08-30 DIAGNOSIS — K56.7 ILEUS, UNSPECIFIED: ICD-10-CM

## 2024-08-30 DIAGNOSIS — K50.012 CROHN'S DISEASE OF SMALL INTESTINE WITH INTESTINAL OBSTRUCTION: ICD-10-CM

## 2024-08-30 DIAGNOSIS — D72.829 ELEVATED WHITE BLOOD CELL COUNT, UNSPECIFIED: ICD-10-CM

## 2024-08-30 DIAGNOSIS — Z90.49 ACQUIRED ABSENCE OF OTHER SPECIFIED PARTS OF DIGESTIVE TRACT: ICD-10-CM

## 2024-08-30 DIAGNOSIS — E55.9 VITAMIN D DEFICIENCY, UNSPECIFIED: ICD-10-CM

## 2024-09-04 LAB — SURGICAL PATHOLOGY STUDY: SIGNIFICANT CHANGE UP

## 2024-09-12 ENCOUNTER — NON-APPOINTMENT (OUTPATIENT)
Age: 49
End: 2024-09-12

## 2024-09-12 ENCOUNTER — APPOINTMENT (OUTPATIENT)
Dept: COLORECTAL SURGERY | Facility: CLINIC | Age: 49
End: 2024-09-12
Payer: MEDICARE

## 2024-09-12 VITALS
SYSTOLIC BLOOD PRESSURE: 126 MMHG | HEIGHT: 60 IN | BODY MASS INDEX: 31.02 KG/M2 | TEMPERATURE: 98.2 F | DIASTOLIC BLOOD PRESSURE: 84 MMHG | HEART RATE: 84 BPM | WEIGHT: 158 LBS

## 2024-09-12 DIAGNOSIS — K50.919 CROHN'S DISEASE, UNSPECIFIED, WITH UNSPECIFIED COMPLICATIONS: ICD-10-CM

## 2024-09-12 PROCEDURE — 99024 POSTOP FOLLOW-UP VISIT: CPT

## 2024-09-12 NOTE — ASSESSMENT
[FreeTextEntry1] : Recovered well, patient reassured Pathology discussed Continue to avoid heavy lifting and strenuous activity for 6-8 weeks postop Importance of follow up with GI discussed for continued medical management. He plans to continue care with his known GI at [facility]. Will send records at his request to that provider.  All questions were answered, patient expressed understanding, and is agreeable to this plan.

## 2024-09-12 NOTE — ASSESSMENT
[FreeTextEntry1] : Recovered well, patient reassured Pathology discussed Continue to avoid heavy lifting and strenuous activity for 6-8 weeks postop Importance of follow up with GI discussed for continued medical management. He plans to continue care with his known GI at [facility]. Will send records at his request to that provider.  All questions were answered, patient expressed understanding, and is agreeable to this plan.

## 2024-09-12 NOTE — HISTORY OF PRESENT ILLNESS
[FreeTextEntry1] : 50 y/o M presents for initial visit s/p recent hospitalization at [facility]  PHx: Crohn's disease (dx 2011, currently not on medication) PSHx: Small bowel resection of terminal ileum (~10 years ago at [place]), Ileocolic resection 08/2024 Allergies: NKDA Medication: denies Family Hx: Denies hx of CRC/IBD  Smoking: denies   GI Dr. Vincenzo Rodriguez  Hx of Crohns disease, hx of resection ~10 years (2014) prior at [place]   8/11/24 - 8/27/24 Hospitalized at [facility] for acute abdominal pain for 1 day. Imaging done w/ concern of closed loop small bowel obstruction.   8/11/24 CT Abdomen and pelvis  Impression:  Status post small bowel resection with ileocolic anastomosis. Distended and thickened small bowel loops proximal to the anastomosis with transition zone identified at the level of the anastomosis and an additional transition point identified more proximally. Findings are suspicious for a closed loop small bowel obstruction. Wall thickening of the distended small bowel loops is consistent with enteritis which could indicate early ischemia versus inflammatory bowel disease. Mild bladder wall thickening could indicate cystitis which may be reactive.  8/11/24 s/p diagnostic laparoscopy, lysis of adhesions by Dr Vincenzo Molina.  Post-operatively, patient remained with NGT, having flatus and small bowel movements. GI consulted for management of Crohn's. Started on IV methylprednisolone.   Colonoscopy attempted 8/14/24, but poor bowel prep and stool noted in the transverse colon  8/16/24 CT Enterography  1. Since 8/11/24, persistently dilated loops of small bowel proximal to the ileocolic anastomosis with associated mild neoterminal ileal thickening; additional probable site of disease just proximal to the anastomosis in the right lower abdomen. Findings suggestive of persistent partial small bowel obstruction.  2. Overall marked interval improvement of terminal ileal and distal ileal wall thickening and edema. In addition, marked interval improvement of diffuse mesenteric edema and fat infilitration.   8/20/24 colonoscopy completed, showing a stricture at the previous ileocolic anastomosis.  8/21/24 patient underwent a diagnostic laparoscopy, open ileocolic resection of stricture ileocolic anastomosis and new ileocolic anastomosis creation by Dr. Dotty Cloud  Pathology:  Prior ileocolic anastomosis, resection: - Moderate transmural predominantly chronic, focally active, inflammation involving both intestinal loops close to the anastomosis site - No definitive granuloma seen - Stapled end of the small intestine is free of inflammation - Stapled end of the colon show focal chronic active inflammation with reactive epithelial changes  Presents today for postop visit. Reports has been recovery well.  Denies fever/chills, denies bleeding, abdominal pain.  Appetite has been well. Denies N/V.  States has noticed BM has increased since after he discharged. Prior to hospitalizations, his BM was once every other day. Now it is two to two to three times a day. Stool is soft. Denies straining.  Denies taking any fiber supplement, stool softener.  States he previously saw Dr. Vincenzo Rodriguez GI doctor from [place] since 2011 for Crohn's Disease. Was on Humira. States Dr. Rodriguez increased his Humira dosage about three years ago. Patient reports self-discontinued medications three years ago and stopped seeing the GI doctor because he felt well.  Has not seen him since discharge from hospital.

## 2024-09-12 NOTE — PHYSICAL EXAM
[FreeTextEntry1] : Medical assistant present for duration of physical examination  General no acute distress, alert and oriented Psych calm, pleasant demeanor, responding appropriately to question Well nourished Comfortable in chair Ambulating without assistance Nonlabored breathing Abdomen soft, nontender, nondistended, no rebound or guarding, well healed incision clean and intact, no hernia appreciated

## 2024-09-12 NOTE — HISTORY OF PRESENT ILLNESS
[FreeTextEntry1] : 48 y/o M presents for initial visit s/p recent hospitalization at [facility]  PHx: Crohn's disease (dx 2011, currently not on medication) PSHx: Small bowel resection of terminal ileum (~10 years ago at [place]), Ileocolic resection 08/2024 Allergies: NKDA Medication: denies Family Hx: Denies hx of CRC/IBD  Smoking: denies   GI Dr. Vincenzo Rodriguez  Hx of Crohns disease, hx of resection ~10 years (2014) prior at [place]   8/11/24 - 8/27/24 Hospitalized at [facility] for acute abdominal pain for 1 day. Imaging done w/ concern of closed loop small bowel obstruction.   8/11/24 CT Abdomen and pelvis  Impression:  Status post small bowel resection with ileocolic anastomosis. Distended and thickened small bowel loops proximal to the anastomosis with transition zone identified at the level of the anastomosis and an additional transition point identified more proximally. Findings are suspicious for a closed loop small bowel obstruction. Wall thickening of the distended small bowel loops is consistent with enteritis which could indicate early ischemia versus inflammatory bowel disease. Mild bladder wall thickening could indicate cystitis which may be reactive.  8/11/24 s/p diagnostic laparoscopy, lysis of adhesions by Dr Vincenzo Molina.  Post-operatively, patient remained with NGT, having flatus and small bowel movements. GI consulted for management of Crohn's. Started on IV methylprednisolone.   Colonoscopy attempted 8/14/24, but poor bowel prep and stool noted in the transverse colon  8/16/24 CT Enterography  1. Since 8/11/24, persistently dilated loops of small bowel proximal to the ileocolic anastomosis with associated mild neoterminal ileal thickening; additional probable site of disease just proximal to the anastomosis in the right lower abdomen. Findings suggestive of persistent partial small bowel obstruction.  2. Overall marked interval improvement of terminal ileal and distal ileal wall thickening and edema. In addition, marked interval improvement of diffuse mesenteric edema and fat infilitration.   8/20/24 colonoscopy completed, showing a stricture at the previous ileocolic anastomosis.  8/21/24 patient underwent a diagnostic laparoscopy, open ileocolic resection of stricture ileocolic anastomosis and new ileocolic anastomosis creation by Dr. Dotty Cloud  Pathology:  Prior ileocolic anastomosis, resection: - Moderate transmural predominantly chronic, focally active, inflammation involving both intestinal loops close to the anastomosis site - No definitive granuloma seen - Stapled end of the small intestine is free of inflammation - Stapled end of the colon show focal chronic active inflammation with reactive epithelial changes  Presents today for postop visit. Reports has been recovery well.  Denies fever/chills, denies bleeding, abdominal pain.  Appetite has been well. Denies N/V.  States has noticed BM has increased since after he discharged. Prior to hospitalizations, his BM was once every other day. Now it is two to two to three times a day. Stool is soft. Denies straining.  Denies taking any fiber supplement, stool softener.  States he previously saw Dr. Vincenzo Rodriguez GI doctor from [place] since 2011 for Crohn's Disease. Was on Humira. States Dr. Rodriguez increased his Humira dosage about three years ago. Patient reports self-discontinued medications three years ago and stopped seeing the GI doctor because he felt well.  Has not seen him since discharge from hospital.

## 2025-08-18 ENCOUNTER — INPATIENT (INPATIENT)
Facility: HOSPITAL | Age: 50
LOS: 1 days | Discharge: HOME | DRG: 389 | End: 2025-08-20
Attending: COLON & RECTAL SURGERY | Admitting: COLON & RECTAL SURGERY
Payer: MEDICARE

## 2025-08-18 VITALS
OXYGEN SATURATION: 99 % | RESPIRATION RATE: 18 BRPM | SYSTOLIC BLOOD PRESSURE: 131 MMHG | HEART RATE: 99 BPM | TEMPERATURE: 98 F | DIASTOLIC BLOOD PRESSURE: 89 MMHG | WEIGHT: 167.99 LBS

## 2025-08-18 DIAGNOSIS — Z90.49 ACQUIRED ABSENCE OF OTHER SPECIFIED PARTS OF DIGESTIVE TRACT: Chronic | ICD-10-CM

## 2025-08-18 DIAGNOSIS — Z98.890 OTHER SPECIFIED POSTPROCEDURAL STATES: Chronic | ICD-10-CM

## 2025-08-18 LAB
ALBUMIN SERPL ELPH-MCNC: 4.6 G/DL — SIGNIFICANT CHANGE UP (ref 3.3–5)
ALP SERPL-CCNC: 75 U/L — SIGNIFICANT CHANGE UP (ref 40–120)
ALT FLD-CCNC: 18 U/L — SIGNIFICANT CHANGE UP (ref 10–45)
ANION GAP SERPL CALC-SCNC: 13 MMOL/L — SIGNIFICANT CHANGE UP (ref 5–17)
AST SERPL-CCNC: 20 U/L — SIGNIFICANT CHANGE UP (ref 10–40)
BASOPHILS # BLD AUTO: 0.05 K/UL — SIGNIFICANT CHANGE UP (ref 0–0.2)
BASOPHILS NFR BLD AUTO: 0.6 % — SIGNIFICANT CHANGE UP (ref 0–2)
BILIRUB SERPL-MCNC: 1.2 MG/DL — SIGNIFICANT CHANGE UP (ref 0.2–1.2)
BUN SERPL-MCNC: 10 MG/DL — SIGNIFICANT CHANGE UP (ref 7–23)
CALCIUM SERPL-MCNC: 9.6 MG/DL — SIGNIFICANT CHANGE UP (ref 8.4–10.5)
CHLORIDE SERPL-SCNC: 105 MMOL/L — SIGNIFICANT CHANGE UP (ref 96–108)
CO2 SERPL-SCNC: 18 MMOL/L — LOW (ref 22–31)
CREAT SERPL-MCNC: 0.54 MG/DL — SIGNIFICANT CHANGE UP (ref 0.5–1.3)
EGFR: 123 ML/MIN/1.73M2 — SIGNIFICANT CHANGE UP
EGFR: 123 ML/MIN/1.73M2 — SIGNIFICANT CHANGE UP
EOSINOPHIL # BLD AUTO: 0.28 K/UL — SIGNIFICANT CHANGE UP (ref 0–0.5)
EOSINOPHIL NFR BLD AUTO: 3.4 % — SIGNIFICANT CHANGE UP (ref 0–6)
GLUCOSE SERPL-MCNC: 101 MG/DL — HIGH (ref 70–99)
HCT VFR BLD CALC: 47.1 % — SIGNIFICANT CHANGE UP (ref 39–50)
HGB BLD-MCNC: 15.7 G/DL — SIGNIFICANT CHANGE UP (ref 13–17)
IMM GRANULOCYTES # BLD AUTO: 0.01 K/UL — SIGNIFICANT CHANGE UP (ref 0–0.07)
IMM GRANULOCYTES NFR BLD AUTO: 0.1 % — SIGNIFICANT CHANGE UP (ref 0–0.9)
LIDOCAIN IGE QN: 19 U/L — SIGNIFICANT CHANGE UP (ref 7–60)
LYMPHOCYTES # BLD AUTO: 1.04 K/UL — SIGNIFICANT CHANGE UP (ref 1–3.3)
LYMPHOCYTES NFR BLD AUTO: 12.7 % — LOW (ref 13–44)
MCHC RBC-ENTMCNC: 28.7 PG — SIGNIFICANT CHANGE UP (ref 27–34)
MCHC RBC-ENTMCNC: 33.3 G/DL — SIGNIFICANT CHANGE UP (ref 32–36)
MCV RBC AUTO: 86.1 FL — SIGNIFICANT CHANGE UP (ref 80–100)
MONOCYTES # BLD AUTO: 0.58 K/UL — SIGNIFICANT CHANGE UP (ref 0–0.9)
MONOCYTES NFR BLD AUTO: 7.1 % — SIGNIFICANT CHANGE UP (ref 2–14)
NEUTROPHILS # BLD AUTO: 6.22 K/UL — SIGNIFICANT CHANGE UP (ref 1.8–7.4)
NEUTROPHILS NFR BLD AUTO: 76.1 % — SIGNIFICANT CHANGE UP (ref 43–77)
NRBC # BLD AUTO: 0 K/UL — SIGNIFICANT CHANGE UP (ref 0–0)
NRBC # FLD: 0 K/UL — SIGNIFICANT CHANGE UP (ref 0–0)
NRBC BLD AUTO-RTO: 0 /100 WBCS — SIGNIFICANT CHANGE UP (ref 0–0)
PLATELET # BLD AUTO: 288 K/UL — SIGNIFICANT CHANGE UP (ref 150–400)
PMV BLD: 9.8 FL — SIGNIFICANT CHANGE UP (ref 7–13)
POTASSIUM SERPL-MCNC: 4.1 MMOL/L — SIGNIFICANT CHANGE UP (ref 3.5–5.3)
POTASSIUM SERPL-SCNC: 4.1 MMOL/L — SIGNIFICANT CHANGE UP (ref 3.5–5.3)
PROT SERPL-MCNC: 7.7 G/DL — SIGNIFICANT CHANGE UP (ref 6–8.3)
RBC # BLD: 5.47 M/UL — SIGNIFICANT CHANGE UP (ref 4.2–5.8)
RBC # FLD: 14.4 % — SIGNIFICANT CHANGE UP (ref 10.3–14.5)
SODIUM SERPL-SCNC: 136 MMOL/L — SIGNIFICANT CHANGE UP (ref 135–145)
WBC # BLD: 8.18 K/UL — SIGNIFICANT CHANGE UP (ref 3.8–10.5)
WBC # FLD AUTO: 8.18 K/UL — SIGNIFICANT CHANGE UP (ref 3.8–10.5)

## 2025-08-18 PROCEDURE — 83690 ASSAY OF LIPASE: CPT

## 2025-08-18 PROCEDURE — 80053 COMPREHEN METABOLIC PANEL: CPT

## 2025-08-18 PROCEDURE — 36415 COLL VENOUS BLD VENIPUNCTURE: CPT

## 2025-08-18 PROCEDURE — 74177 CT ABD & PELVIS W/CONTRAST: CPT | Mod: 26

## 2025-08-18 PROCEDURE — 85025 COMPLETE CBC W/AUTO DIFF WBC: CPT

## 2025-08-18 PROCEDURE — 99285 EMERGENCY DEPT VISIT HI MDM: CPT

## 2025-08-18 PROCEDURE — 71045 X-RAY EXAM CHEST 1 VIEW: CPT | Mod: 26

## 2025-08-18 RX ORDER — ONDANSETRON HCL/PF 4 MG/2 ML
4 VIAL (ML) INJECTION EVERY 6 HOURS
Refills: 0 | Status: DISCONTINUED | OUTPATIENT
Start: 2025-08-18 | End: 2025-08-20

## 2025-08-18 RX ORDER — HEPARIN SODIUM 1000 [USP'U]/ML
5000 INJECTION INTRAVENOUS; SUBCUTANEOUS EVERY 8 HOURS
Refills: 0 | Status: DISCONTINUED | OUTPATIENT
Start: 2025-08-18 | End: 2025-08-20

## 2025-08-18 RX ORDER — ONDANSETRON HCL/PF 4 MG/2 ML
4 VIAL (ML) INJECTION ONCE
Refills: 0 | Status: COMPLETED | OUTPATIENT
Start: 2025-08-18 | End: 2025-08-18

## 2025-08-18 RX ORDER — IOHEXOL 350 MG/ML
30 INJECTION, SOLUTION INTRAVENOUS ONCE
Refills: 0 | Status: COMPLETED | OUTPATIENT
Start: 2025-08-18 | End: 2025-08-18

## 2025-08-18 RX ORDER — BENZOCAINE 220 MG/G
1 SPRAY, METERED PERIODONTAL EVERY 6 HOURS
Refills: 0 | Status: DISCONTINUED | OUTPATIENT
Start: 2025-08-18 | End: 2025-08-20

## 2025-08-18 RX ORDER — SODIUM CHLORIDE 9 G/1000ML
1000 INJECTION, SOLUTION INTRAVENOUS
Refills: 0 | Status: DISCONTINUED | OUTPATIENT
Start: 2025-08-18 | End: 2025-08-20

## 2025-08-18 RX ADMIN — Medication 1000 MILLILITER(S): at 18:24

## 2025-08-18 RX ADMIN — IOHEXOL 30 MILLILITER(S): 350 INJECTION, SOLUTION INTRAVENOUS at 18:24

## 2025-08-18 RX ADMIN — Medication 20 MILLIGRAM(S): at 18:23

## 2025-08-18 RX ADMIN — SODIUM CHLORIDE 115 MILLILITER(S): 9 INJECTION, SOLUTION INTRAVENOUS at 23:06

## 2025-08-18 RX ADMIN — HEPARIN SODIUM 5000 UNIT(S): 1000 INJECTION INTRAVENOUS; SUBCUTANEOUS at 23:11

## 2025-08-18 RX ADMIN — Medication 4 MILLIGRAM(S): at 18:24

## 2025-08-19 LAB
ANION GAP SERPL CALC-SCNC: 9 MMOL/L — SIGNIFICANT CHANGE UP (ref 5–17)
APTT BLD: 32.2 SEC — SIGNIFICANT CHANGE UP (ref 26.1–36.8)
BLD GP AB SCN SERPL QL: NEGATIVE — SIGNIFICANT CHANGE UP
BUN SERPL-MCNC: 8 MG/DL — SIGNIFICANT CHANGE UP (ref 7–23)
CALCIUM SERPL-MCNC: 8.9 MG/DL — SIGNIFICANT CHANGE UP (ref 8.4–10.5)
CHLORIDE SERPL-SCNC: 108 MMOL/L — SIGNIFICANT CHANGE UP (ref 96–108)
CO2 SERPL-SCNC: 22 MMOL/L — SIGNIFICANT CHANGE UP (ref 22–31)
CREAT SERPL-MCNC: 0.61 MG/DL — SIGNIFICANT CHANGE UP (ref 0.5–1.3)
EGFR: 118 ML/MIN/1.73M2 — SIGNIFICANT CHANGE UP
EGFR: 118 ML/MIN/1.73M2 — SIGNIFICANT CHANGE UP
GLUCOSE SERPL-MCNC: 96 MG/DL — SIGNIFICANT CHANGE UP (ref 70–99)
HCT VFR BLD CALC: 43.7 % — SIGNIFICANT CHANGE UP (ref 39–50)
HGB BLD-MCNC: 14.5 G/DL — SIGNIFICANT CHANGE UP (ref 13–17)
INR BLD: 1.05 — SIGNIFICANT CHANGE UP (ref 0.85–1.16)
MAGNESIUM SERPL-MCNC: 1.9 MG/DL — SIGNIFICANT CHANGE UP (ref 1.6–2.6)
MCHC RBC-ENTMCNC: 28.8 PG — SIGNIFICANT CHANGE UP (ref 27–34)
MCHC RBC-ENTMCNC: 33.2 G/DL — SIGNIFICANT CHANGE UP (ref 32–36)
MCV RBC AUTO: 86.9 FL — SIGNIFICANT CHANGE UP (ref 80–100)
NRBC # BLD AUTO: 0 K/UL — SIGNIFICANT CHANGE UP (ref 0–0)
NRBC # FLD: 0 K/UL — SIGNIFICANT CHANGE UP (ref 0–0)
NRBC BLD AUTO-RTO: 0 /100 WBCS — SIGNIFICANT CHANGE UP (ref 0–0)
PHOSPHATE SERPL-MCNC: 1.9 MG/DL — LOW (ref 2.5–4.5)
PLATELET # BLD AUTO: 250 K/UL — SIGNIFICANT CHANGE UP (ref 150–400)
PMV BLD: 10 FL — SIGNIFICANT CHANGE UP (ref 7–13)
POTASSIUM SERPL-MCNC: 3.9 MMOL/L — SIGNIFICANT CHANGE UP (ref 3.5–5.3)
POTASSIUM SERPL-SCNC: 3.9 MMOL/L — SIGNIFICANT CHANGE UP (ref 3.5–5.3)
PROTHROM AB SERPL-ACNC: 12.2 SEC — SIGNIFICANT CHANGE UP (ref 9.9–13.4)
RBC # BLD: 5.03 M/UL — SIGNIFICANT CHANGE UP (ref 4.2–5.8)
RBC # FLD: 14.4 % — SIGNIFICANT CHANGE UP (ref 10.3–14.5)
RH IG SCN BLD-IMP: NEGATIVE — SIGNIFICANT CHANGE UP
SODIUM SERPL-SCNC: 139 MMOL/L — SIGNIFICANT CHANGE UP (ref 135–145)
WBC # BLD: 6.46 K/UL — SIGNIFICANT CHANGE UP (ref 3.8–10.5)
WBC # FLD AUTO: 6.46 K/UL — SIGNIFICANT CHANGE UP (ref 3.8–10.5)

## 2025-08-19 PROCEDURE — 83690 ASSAY OF LIPASE: CPT

## 2025-08-19 PROCEDURE — 86850 RBC ANTIBODY SCREEN: CPT

## 2025-08-19 PROCEDURE — 80048 BASIC METABOLIC PNL TOTAL CA: CPT

## 2025-08-19 PROCEDURE — 85610 PROTHROMBIN TIME: CPT

## 2025-08-19 PROCEDURE — 74019 RADEX ABDOMEN 2 VIEWS: CPT | Mod: 26

## 2025-08-19 PROCEDURE — 80053 COMPREHEN METABOLIC PANEL: CPT

## 2025-08-19 PROCEDURE — 85730 THROMBOPLASTIN TIME PARTIAL: CPT

## 2025-08-19 PROCEDURE — 84100 ASSAY OF PHOSPHORUS: CPT

## 2025-08-19 PROCEDURE — 86901 BLOOD TYPING SEROLOGIC RH(D): CPT

## 2025-08-19 PROCEDURE — 86900 BLOOD TYPING SEROLOGIC ABO: CPT

## 2025-08-19 PROCEDURE — 74018 RADEX ABDOMEN 1 VIEW: CPT | Mod: 26,XE

## 2025-08-19 PROCEDURE — 71045 X-RAY EXAM CHEST 1 VIEW: CPT

## 2025-08-19 PROCEDURE — 83735 ASSAY OF MAGNESIUM: CPT

## 2025-08-19 PROCEDURE — 36415 COLL VENOUS BLD VENIPUNCTURE: CPT

## 2025-08-19 PROCEDURE — 99222 1ST HOSP IP/OBS MODERATE 55: CPT | Mod: GC

## 2025-08-19 PROCEDURE — 85025 COMPLETE CBC W/AUTO DIFF WBC: CPT

## 2025-08-19 PROCEDURE — 85027 COMPLETE CBC AUTOMATED: CPT

## 2025-08-19 RX ORDER — POTASSIUM PHOSPHATE, MONOBASIC POTASSIUM PHOSPHATE, DIBASIC INJECTION, 236; 224 MG/ML; MG/ML
30 SOLUTION, CONCENTRATE INTRAVENOUS ONCE
Refills: 0 | Status: COMPLETED | OUTPATIENT
Start: 2025-08-19 | End: 2025-08-19

## 2025-08-19 RX ORDER — DIATRIZOATE MEGLUMINE, SODIUM 66 %-10 %
30 VIAL (ML) INJECTION ONCE
Refills: 0 | Status: COMPLETED | OUTPATIENT
Start: 2025-08-19 | End: 2025-08-19

## 2025-08-19 RX ADMIN — SODIUM CHLORIDE 115 MILLILITER(S): 9 INJECTION, SOLUTION INTRAVENOUS at 07:52

## 2025-08-19 RX ADMIN — HEPARIN SODIUM 5000 UNIT(S): 1000 INJECTION INTRAVENOUS; SUBCUTANEOUS at 22:13

## 2025-08-19 RX ADMIN — Medication 40 MILLIGRAM(S): at 13:02

## 2025-08-19 RX ADMIN — HEPARIN SODIUM 5000 UNIT(S): 1000 INJECTION INTRAVENOUS; SUBCUTANEOUS at 13:02

## 2025-08-19 RX ADMIN — HEPARIN SODIUM 5000 UNIT(S): 1000 INJECTION INTRAVENOUS; SUBCUTANEOUS at 07:10

## 2025-08-19 RX ADMIN — BENZOCAINE 1 SPRAY(S): 220 SPRAY, METERED PERIODONTAL at 05:46

## 2025-08-19 RX ADMIN — POTASSIUM PHOSPHATE, MONOBASIC POTASSIUM PHOSPHATE, DIBASIC INJECTION, 83.33 MILLIMOLE(S): 236; 224 SOLUTION, CONCENTRATE INTRAVENOUS at 13:02

## 2025-08-19 RX ADMIN — Medication 30 MILLILITER(S): at 12:18

## 2025-08-20 ENCOUNTER — TRANSCRIPTION ENCOUNTER (OUTPATIENT)
Age: 50
End: 2025-08-20

## 2025-08-20 VITALS
SYSTOLIC BLOOD PRESSURE: 123 MMHG | TEMPERATURE: 98 F | HEART RATE: 81 BPM | RESPIRATION RATE: 18 BRPM | DIASTOLIC BLOOD PRESSURE: 74 MMHG | OXYGEN SATURATION: 96 %

## 2025-08-20 LAB
ANION GAP SERPL CALC-SCNC: 16 MMOL/L — SIGNIFICANT CHANGE UP (ref 5–17)
BUN SERPL-MCNC: 9 MG/DL — SIGNIFICANT CHANGE UP (ref 7–23)
CALCIUM SERPL-MCNC: 9.3 MG/DL — SIGNIFICANT CHANGE UP (ref 8.4–10.5)
CHLORIDE SERPL-SCNC: 100 MMOL/L — SIGNIFICANT CHANGE UP (ref 96–108)
CO2 SERPL-SCNC: 21 MMOL/L — LOW (ref 22–31)
CREAT SERPL-MCNC: 0.67 MG/DL — SIGNIFICANT CHANGE UP (ref 0.5–1.3)
EGFR: 115 ML/MIN/1.73M2 — SIGNIFICANT CHANGE UP
EGFR: 115 ML/MIN/1.73M2 — SIGNIFICANT CHANGE UP
GLUCOSE SERPL-MCNC: 78 MG/DL — SIGNIFICANT CHANGE UP (ref 70–99)
HCT VFR BLD CALC: 45.8 % — SIGNIFICANT CHANGE UP (ref 39–50)
HGB BLD-MCNC: 14.9 G/DL — SIGNIFICANT CHANGE UP (ref 13–17)
MAGNESIUM SERPL-MCNC: 1.6 MG/DL — SIGNIFICANT CHANGE UP (ref 1.6–2.6)
MCHC RBC-ENTMCNC: 28.7 PG — SIGNIFICANT CHANGE UP (ref 27–34)
MCHC RBC-ENTMCNC: 32.5 G/DL — SIGNIFICANT CHANGE UP (ref 32–36)
MCV RBC AUTO: 88.1 FL — SIGNIFICANT CHANGE UP (ref 80–100)
NRBC # BLD AUTO: 0 K/UL — SIGNIFICANT CHANGE UP (ref 0–0)
NRBC # FLD: 0 K/UL — SIGNIFICANT CHANGE UP (ref 0–0)
NRBC BLD AUTO-RTO: 0 /100 WBCS — SIGNIFICANT CHANGE UP (ref 0–0)
PHOSPHATE SERPL-MCNC: 1.6 MG/DL — LOW (ref 2.5–4.5)
PLATELET # BLD AUTO: 157 K/UL — SIGNIFICANT CHANGE UP (ref 150–400)
PMV BLD: 11 FL — SIGNIFICANT CHANGE UP (ref 7–13)
POTASSIUM SERPL-MCNC: 3.2 MMOL/L — LOW (ref 3.5–5.3)
POTASSIUM SERPL-SCNC: 3.2 MMOL/L — LOW (ref 3.5–5.3)
RBC # BLD: 5.2 M/UL — SIGNIFICANT CHANGE UP (ref 4.2–5.8)
RBC # FLD: 13.8 % — SIGNIFICANT CHANGE UP (ref 10.3–14.5)
SODIUM SERPL-SCNC: 137 MMOL/L — SIGNIFICANT CHANGE UP (ref 135–145)
WBC # BLD: 5.1 K/UL — SIGNIFICANT CHANGE UP (ref 3.8–10.5)
WBC # FLD AUTO: 5.1 K/UL — SIGNIFICANT CHANGE UP (ref 3.8–10.5)

## 2025-08-20 PROCEDURE — 85025 COMPLETE CBC W/AUTO DIFF WBC: CPT

## 2025-08-20 PROCEDURE — 96375 TX/PRO/DX INJ NEW DRUG ADDON: CPT

## 2025-08-20 PROCEDURE — 83690 ASSAY OF LIPASE: CPT

## 2025-08-20 PROCEDURE — 83735 ASSAY OF MAGNESIUM: CPT

## 2025-08-20 PROCEDURE — 86901 BLOOD TYPING SEROLOGIC RH(D): CPT

## 2025-08-20 PROCEDURE — 80048 BASIC METABOLIC PNL TOTAL CA: CPT

## 2025-08-20 PROCEDURE — 74019 RADEX ABDOMEN 2 VIEWS: CPT

## 2025-08-20 PROCEDURE — 86900 BLOOD TYPING SEROLOGIC ABO: CPT

## 2025-08-20 PROCEDURE — 85730 THROMBOPLASTIN TIME PARTIAL: CPT

## 2025-08-20 PROCEDURE — 84100 ASSAY OF PHOSPHORUS: CPT

## 2025-08-20 PROCEDURE — 80053 COMPREHEN METABOLIC PANEL: CPT

## 2025-08-20 PROCEDURE — 85027 COMPLETE CBC AUTOMATED: CPT

## 2025-08-20 PROCEDURE — 74018 RADEX ABDOMEN 1 VIEW: CPT

## 2025-08-20 PROCEDURE — 85610 PROTHROMBIN TIME: CPT

## 2025-08-20 PROCEDURE — 86850 RBC ANTIBODY SCREEN: CPT

## 2025-08-20 PROCEDURE — 71045 X-RAY EXAM CHEST 1 VIEW: CPT

## 2025-08-20 PROCEDURE — 36415 COLL VENOUS BLD VENIPUNCTURE: CPT

## 2025-08-20 PROCEDURE — 99285 EMERGENCY DEPT VISIT HI MDM: CPT | Mod: 25

## 2025-08-20 PROCEDURE — 74177 CT ABD & PELVIS W/CONTRAST: CPT

## 2025-08-20 PROCEDURE — 99238 HOSP IP/OBS DSCHRG MGMT 30/<: CPT

## 2025-08-20 PROCEDURE — 96374 THER/PROPH/DIAG INJ IV PUSH: CPT

## 2025-08-20 RX ORDER — POTASSIUM PHOSPHATE, MONOBASIC POTASSIUM PHOSPHATE, DIBASIC INJECTION, 236; 224 MG/ML; MG/ML
30 SOLUTION, CONCENTRATE INTRAVENOUS ONCE
Refills: 0 | Status: COMPLETED | OUTPATIENT
Start: 2025-08-20 | End: 2025-08-20

## 2025-08-20 RX ORDER — MAGNESIUM SULFATE 500 MG/ML
1 SYRINGE (ML) INJECTION ONCE
Refills: 0 | Status: COMPLETED | OUTPATIENT
Start: 2025-08-20 | End: 2025-08-20

## 2025-08-20 RX ORDER — RISANKIZUMAB-RZAA 150 MG/ML
360 INJECTION SUBCUTANEOUS
Refills: 0 | DISCHARGE

## 2025-08-20 RX ADMIN — Medication 100 GRAM(S): at 09:20

## 2025-08-20 RX ADMIN — Medication 100 MILLIEQUIVALENT(S): at 11:49

## 2025-08-20 RX ADMIN — Medication 40 MILLIGRAM(S): at 11:49

## 2025-08-20 RX ADMIN — Medication 100 MILLIEQUIVALENT(S): at 13:30

## 2025-08-20 RX ADMIN — HEPARIN SODIUM 5000 UNIT(S): 1000 INJECTION INTRAVENOUS; SUBCUTANEOUS at 13:30

## 2025-08-20 RX ADMIN — POTASSIUM PHOSPHATE, MONOBASIC POTASSIUM PHOSPHATE, DIBASIC INJECTION, 83.33 MILLIMOLE(S): 236; 224 SOLUTION, CONCENTRATE INTRAVENOUS at 17:10

## 2025-08-20 RX ADMIN — HEPARIN SODIUM 5000 UNIT(S): 1000 INJECTION INTRAVENOUS; SUBCUTANEOUS at 06:22

## 2025-08-22 PROBLEM — K56.699 OTHER INTESTINAL OBSTRUCTION UNSPECIFIED AS TO PARTIAL VERSUS COMPLETE OBSTRUCTION: Chronic | Status: ACTIVE | Noted: 2025-08-18

## 2025-08-26 DIAGNOSIS — K50.90 CROHN'S DISEASE, UNSPECIFIED, WITHOUT COMPLICATIONS: ICD-10-CM

## 2025-08-26 DIAGNOSIS — K56.51 INTESTINAL ADHESIONS [BANDS], WITH PARTIAL OBSTRUCTION: ICD-10-CM

## 2025-08-26 DIAGNOSIS — Z79.899 OTHER LONG TERM (CURRENT) DRUG THERAPY: ICD-10-CM

## 2025-09-18 ENCOUNTER — NON-APPOINTMENT (OUTPATIENT)
Age: 50
End: 2025-09-18

## 2025-09-18 ENCOUNTER — APPOINTMENT (OUTPATIENT)
Dept: COLORECTAL SURGERY | Facility: CLINIC | Age: 50
End: 2025-09-18
Payer: MEDICARE

## 2025-09-18 VITALS
HEART RATE: 101 BPM | SYSTOLIC BLOOD PRESSURE: 155 MMHG | TEMPERATURE: 98 F | BODY MASS INDEX: 31.02 KG/M2 | WEIGHT: 158 LBS | HEIGHT: 60 IN | DIASTOLIC BLOOD PRESSURE: 77 MMHG

## 2025-09-18 DIAGNOSIS — K50.919 CROHN'S DISEASE, UNSPECIFIED, WITH UNSPECIFIED COMPLICATIONS: ICD-10-CM

## 2025-09-18 PROCEDURE — 99213 OFFICE O/P EST LOW 20 MIN: CPT

## (undated) DEVICE — SUT PDS II 0 18" ENDOLOOP LIGATURE

## (undated) DEVICE — TROCAR COVIDIEN VERSAPORT BLADELESS OPTICAL 5MM STANDARD

## (undated) DEVICE — SUT MONOCRYL 4-0 27" PS-2 UNDYED

## (undated) DEVICE — TROCAR ETHICON ENDOPATH XCEL BLADELESS 5MM X 100MM STABILITY

## (undated) DEVICE — SUT VICRYL 3-0 18" SH (POP-OFF)

## (undated) DEVICE — SYR ASEPTO

## (undated) DEVICE — URETERAL CATH RED RUBBER 14FR (GREEN)

## (undated) DEVICE — SUT VICRYL 2-0 27" SH

## (undated) DEVICE — FOLEY TRAY 16FR LF URINE METER SURESTEP

## (undated) DEVICE — LIGASURE BLUNT TIP 37CM

## (undated) DEVICE — TUBING STRYKER PNEUMOCLEAR HIGH FLOW

## (undated) DEVICE — TUBING STRYKER PNEUMOCLEAR SMOKE EVACUATION HIGH FLOW

## (undated) DEVICE — TROCAR COVIDIEN BLUNT TIP HASSAN 12MM

## (undated) DEVICE — Device

## (undated) DEVICE — CLIPPER BLADE GENERAL USE

## (undated) DEVICE — LIGASURE IMPACT

## (undated) DEVICE — SUT PDS II 1 48" TP-1

## (undated) DEVICE — TROCAR ETHICON ENDOPATH XCEL BLADELESS 12MM X 100MM STABILITY

## (undated) DEVICE — PACK ABDOMINAL GENERAL CLOSING

## (undated) DEVICE — ELCTR BOVIE PENCIL SMOKE EVACUATION

## (undated) DEVICE — STAPLER SKIN PROXIMATE

## (undated) DEVICE — TIP METZENBAUM SCISSOR MONOPOLAR ENDOCUT (ORANGE)

## (undated) DEVICE — ELCTR BOVIE TIP BLADE VALLEYLAB 6.5"

## (undated) DEVICE — PACK GENERAL LAPAROSCOPY

## (undated) DEVICE — SUT VICRYL 2-0 18" TIES

## (undated) DEVICE — TUBING STRYKEFLOW II SUCTION / IRRIGATOR

## (undated) DEVICE — TROCAR COVIDIEN VERSAONE FIXATION CANNULA 12MM

## (undated) DEVICE — PACK PERI GYN

## (undated) DEVICE — D HELP - CLEARVIEW CLEARIFY SYSTEM

## (undated) DEVICE — STAPLER COVIDIEN ENDO GIA STANDARD HANDLE

## (undated) DEVICE — PACK EXPLORATORY LAPAROTOMY

## (undated) DEVICE — ELCTR GROUNDING PAD ADULT COVIDIEN

## (undated) DEVICE — GLV 8 PROTEXIS (WHITE)

## (undated) DEVICE — DRAPE TOWEL BLUE 17" X 24"

## (undated) DEVICE — DVC INFLATION CRE STERIFLATE

## (undated) DEVICE — GLV 6.5 PROTEXIS (WHITE)

## (undated) DEVICE — GLV 7 PROTEXIS (WHITE)

## (undated) DEVICE — WARMING BLANKET LOWER ADULT

## (undated) DEVICE — INSUFFLATION NDL COVIDIEN SURGINEEDLE VERESS 150MM LONG

## (undated) DEVICE — FORCEP RADIAL JAW 4 W NDL 2.2MM 2.8MM 240CM ORANGE DISP

## (undated) DEVICE — VENODYNE/SCD SLEEVE CALF MEDIUM

## (undated) DEVICE — LIGASURE MARYLAND 37CM

## (undated) DEVICE — ELCTR STRYKER NEPTUNE SMOKE EVACUATION PENCIL (GREEN)

## (undated) DEVICE — SUT VICRYL 3-0 27" SH

## (undated) DEVICE — FOLEY TRAY 16FR 5CC LF UMETER CLOSED

## (undated) DEVICE — SUT PDS II PLUS 1 27" CT-1

## (undated) DEVICE — DRSG DERMABOND 0.7ML

## (undated) DEVICE — TUBING STRYKER PNEUMOSURE HI FLOW INSUFFLATOR

## (undated) DEVICE — GLV 6 PROTEXIS (WHITE)

## (undated) DEVICE — MARKING PEN W RULER

## (undated) DEVICE — SUT VICRYL 0 27" UR-6

## (undated) DEVICE — POSITIONER FOAM EGG CRATE ULNAR 2PCS (PINK)

## (undated) DEVICE — SUT MONOCRYL 4-0 18" PS-2

## (undated) DEVICE — SUT PDS II 0 27" CT-1

## (undated) DEVICE — STOPCOCK 4-WAY

## (undated) DEVICE — SUT VICRYL 1 27" OS-8 UNDYED

## (undated) DEVICE — GOWN ROYAL SILK XL

## (undated) DEVICE — TROCAR COVIDIEN VERSAONE FIXATION CANNULA 5MM

## (undated) DEVICE — TROCAR COVIDIEN VERSAONE BLUNT TIP HASSAN 12MM